# Patient Record
Sex: FEMALE | Race: WHITE | NOT HISPANIC OR LATINO | Employment: OTHER | URBAN - METROPOLITAN AREA
[De-identification: names, ages, dates, MRNs, and addresses within clinical notes are randomized per-mention and may not be internally consistent; named-entity substitution may affect disease eponyms.]

---

## 2018-11-19 ENCOUNTER — HOSPITAL ENCOUNTER (EMERGENCY)
Facility: HOSPITAL | Age: 73
Discharge: HOME | End: 2018-11-19
Attending: EMERGENCY MEDICINE
Payer: MEDICARE

## 2018-11-19 VITALS — BODY MASS INDEX: 24.54 KG/M2 | WEIGHT: 125.66 LBS

## 2018-11-19 DIAGNOSIS — H81.10 BENIGN PAROXYSMAL POSITIONAL VERTIGO, UNSPECIFIED LATERALITY: Primary | ICD-10-CM

## 2018-11-19 PROCEDURE — 99283 EMERGENCY DEPT VISIT LOW MDM: CPT

## 2018-11-19 RX ORDER — MECLIZINE HCL 12.5 MG/1
12.5 TABLET ORAL EVERY 8 HOURS PRN
Qty: 6 TABLET | Refills: 0 | Status: SHIPPED | OUTPATIENT
Start: 2018-11-19 | End: 2021-02-21

## 2018-11-19 NOTE — ED PROVIDER NOTES
History  Chief Complaint   Patient presents with    Dizziness     states she had an episode of dizzniess yesteday morning which passed, throughout last night when getting up to use the bathroom she had multiple episodes again, which continued into this morning  denies hx of vertigo or any recent illness  HPI     54-year-old female with history only of glaucoma presenting for evaluation of discrete episodes of dizziness  Patient states that she had an episode of dizziness which she describes as the room spinning yesterday morning which passed after about a minute  States that she was asymptomatic throughout the night, but this morning when she got up to use the bathroom had another episode of abrupt onset sensation of the room spinning, resolved when she sat down  Has occurred intermittently a couple of times since then  No history of vertigo  Denies recent illness  No head injury, slurred speech, or facial droop  Denies gait disturbance, but does report feeling like she had to hold onto the wall when she felt dizzy to keep from falling  No blurry vision or double vision  No focal sensory deficits  Denies headache or neck pain  No history of TIA or CVA  Symptoms are sometimes worse with turning the head from side to side or ambulating  Denies feeling lightheaded or like she is going to pass out  No chest pain, shortness of breath, or palpitations  None       Past Medical History:   Diagnosis Date    Glaucoma        Past Surgical History:   Procedure Laterality Date    TONSILLECTOMY         History reviewed  No pertinent family history  I have reviewed and agree with the history as documented  Social History   Substance Use Topics    Smoking status: Never Smoker    Smokeless tobacco: Never Used    Alcohol use Yes      Comment: social        Review of Systems   Constitutional: Negative for chills and fever  HENT: Negative for congestion  Eyes: Negative for visual disturbance  Respiratory: Negative for cough and shortness of breath  Cardiovascular: Negative for chest pain, palpitations and leg swelling  Gastrointestinal: Negative for abdominal pain, nausea and vomiting  Genitourinary: Negative for dysuria and frequency  Musculoskeletal: Negative for arthralgias, back pain, neck pain and neck stiffness  Skin: Negative for rash  Neurological: Positive for dizziness  Negative for tremors, syncope, facial asymmetry, speech difficulty, weakness, light-headedness, numbness and headaches  Psychiatric/Behavioral: Negative for agitation, behavioral problems and confusion  Physical Exam  Physical Exam   Constitutional: She is oriented to person, place, and time  She appears well-developed and well-nourished  No distress  HENT:   Head: Normocephalic and atraumatic  Right Ear: External ear normal    Left Ear: External ear normal    Nose: Nose normal    Mouth/Throat: Oropharynx is clear and moist    TMs normal in appearance  Eyes: Pupils are equal, round, and reactive to light  Conjunctivae and EOM are normal    Visual fields intact to confrontation   Neck: Normal range of motion  Neck supple  Cardiovascular: Normal rate, regular rhythm, normal heart sounds and intact distal pulses  Exam reveals no gallop and no friction rub  No murmur heard  Pulmonary/Chest: Effort normal and breath sounds normal  No respiratory distress  She has no wheezes  She has no rales  Abdominal: Soft  Bowel sounds are normal  She exhibits no distension  There is no tenderness  There is no guarding  Musculoskeletal: Normal range of motion  She exhibits no edema or deformity  Neurological: She is alert and oriented to person, place, and time  She exhibits normal muscle tone  Face symmetric, tongue midline, 5/5 strength in the proximal and distal upper and lower extremities bilaterally with intact sensation to light touch throughout  CN II-XII intact   Normal finger-to-nose, rapid alternating movements, and heel-to-shin bilaterally  Normal speech, normal gait  No pronator drift  Skin: Skin is warm and dry  She is not diaphoretic  Vital Signs  ED Triage Vitals   Temp Pulse Resp BP SpO2   -- -- -- -- --      Temp src Heart Rate Source Patient Position - Orthostatic VS BP Location FiO2 (%)   -- -- -- -- --      Pain Score       --           There were no vitals filed for this visit  Visual Acuity      ED Medications  Medications - No data to display    Diagnostic Studies  Results Reviewed     None                 No orders to display              Procedures  Procedures       Phone Contacts  ED Phone Contact    ED Course                               MDM  Number of Diagnoses or Management Options  Benign paroxysmal positional vertigo, unspecified laterality: new and requires workup  Diagnosis management comments:   Generally well appearing  Afebrile and hemodynamically stable  Patient is asymptomatic on arrival, with normal neuro exam as above  Sensation of room spinning did recur with turning the head from side to side and ambulating, resolved after about 30 sec  No central ataxia, difficulty with finger-to-nose, heel-to-shin, or rapid alternating movements to suggest central etiology of her vertigo  She denies hearing loss or ringing of the ears, TMs are normal in appearance  No neck pain, history of trauma, or neurologic findings to suggest cervical artery dissection  Suspect benign paroxysmal positional vertigo as the cause of her symptoms  Patient declines meclizine or Valium, stating that her symptoms are getting better on their own  Prescription provided for meclizine for home if she needs it  We discussed return precautions for vertigo at rest or new neurologic symptoms  She was discharged in good condition      Patient Progress  Patient progress: stable    CritCare Time       Disposition  Final diagnoses:   Benign paroxysmal positional vertigo, unspecified laterality     Time reflects when diagnosis was documented in both MDM as applicable and the Disposition within this note     Time User Action Codes Description Comment    11/19/2018  8:38 AM Coleman Henderson Add [H81 10] Benign paroxysmal positional vertigo, unspecified laterality       ED Disposition     ED Disposition Condition Comment    Discharge  Radha Moss discharge to home/self care  Condition at discharge: Good        Follow-up Information    None         Discharge Medication List as of 11/19/2018  8:40 AM      START taking these medications    Details   meclizine (ANTIVERT) 12 5 MG tablet Take 1 tablet (12 5 mg total) by mouth every 8 (eight) hours as needed for dizziness for up to 3 days, Starting Mon 11/19/2018, Until Thu 11/22/2018, Print           No discharge procedures on file      ED Provider  Electronically Signed by           Rosa Elena Collazo MD  11/19/18 0535

## 2018-11-19 NOTE — DISCHARGE INSTRUCTIONS
Benign Paroxysmal Positional Vertigo   WHAT YOU NEED TO KNOW:   BPPV is an inner ear condition that causes you to suddenly feel dizzy  Benign means it is not serious or life-threatening  BPPV is caused by a problem with the nerves and structure of your inner ear  BPPV happens when small pieces of calcium break loose and lump together in one of your inner ear canals  DISCHARGE INSTRUCTIONS:   Seek care immediately if:   · You fall during a BPPV episode and are injured  · You have a severe headache that does not go away  · You have new changes in your vision or feel weak or confused  · You have problems hearing, or you have ringing or buzzing in your ears  Contact your healthcare provider if:   · Your BPPV symptoms do not go away or they return  · You have problems with your balance, or you are falling often  · You have new or increased nausea or vomiting with vertigo  · You feel anxious or depressed and do not want to leave your home  · You have questions or concerns about your condition or care  Medicines:   · Medicines  may be recommended or prescribed to treat dizziness or nausea  · Take your medicine as directed  Contact your healthcare provider if you think your medicine is not helping or if you have side effects  Tell him of her if you are allergic to any medicine  Keep a list of the medicines, vitamins, and herbs you take  Include the amounts, and when and why you take them  Bring the list or the pill bottles to follow-up visits  Carry your medicine list with you in case of an emergency  Prevent your symptoms:   · Try to avoid sudden head movements  Stand up and lie down slowly  · Raise and support your head when you lie down  Place pillows under your upper back and head or rest in a recliner  · Change your position often when you are lying down  Try not to lie with your head on the same side for long periods of time  Roll over slowly       · Wear protective gear when you ride a bike or play sports  A helmet helps protect your head from injury  Follow up with your healthcare provider as directed: You may need to return in 1 month to check the progress of your treatment  Write down your questions so you remember to ask them during your visits  © 2017 [employer] Information is for End User's use only and may not be sold, redistributed or otherwise used for commercial purposes  All illustrations and images included in CareNotes® are the copyrighted property of [employer]  or [employer]ind [employer]  The above information is an  only  It is not intended as medical advice for individual conditions or treatments  Talk to your doctor, nurse or pharmacist before following any medical regimen to see if it is safe and effective for you

## 2018-11-26 ENCOUNTER — TRANSCRIBE ORDERS (OUTPATIENT)
Dept: ADMINISTRATIVE | Facility: HOSPITAL | Age: 73
End: 2018-11-26

## 2018-11-26 DIAGNOSIS — R42 DIZZINESS: Primary | ICD-10-CM

## 2018-11-28 ENCOUNTER — HOSPITAL ENCOUNTER (OUTPATIENT)
Dept: NON INVASIVE DIAGNOSTICS | Facility: CLINIC | Age: 73
Discharge: HOME | End: 2018-11-28
Payer: MEDICARE

## 2018-11-28 DIAGNOSIS — R42 DIZZINESS: ICD-10-CM

## 2018-11-28 PROCEDURE — 93880 EXTRACRANIAL BILAT STUDY: CPT | Performed by: SURGERY

## 2018-11-28 PROCEDURE — 93880 EXTRACRANIAL BILAT STUDY: CPT

## 2018-11-30 ENCOUNTER — EVALUATION (OUTPATIENT)
Dept: PHYSICAL THERAPY | Facility: CLINIC | Age: 73
End: 2018-11-30
Payer: MEDICARE

## 2018-11-30 ENCOUNTER — TRANSCRIBE ORDERS (OUTPATIENT)
Dept: PHYSICAL THERAPY | Facility: CLINIC | Age: 73
End: 2018-11-30

## 2018-11-30 VITALS — DIASTOLIC BLOOD PRESSURE: 78 MMHG | SYSTOLIC BLOOD PRESSURE: 145 MMHG | HEART RATE: 77 BPM

## 2018-11-30 DIAGNOSIS — R42 DIZZINESS: Primary | ICD-10-CM

## 2018-11-30 DIAGNOSIS — R42 DIZZINESS AND GIDDINESS: Primary | ICD-10-CM

## 2018-11-30 PROCEDURE — G8982 BODY POS GOAL STATUS: HCPCS | Performed by: PHYSICAL THERAPIST

## 2018-11-30 PROCEDURE — 97162 PT EVAL MOD COMPLEX 30 MIN: CPT | Performed by: PHYSICAL THERAPIST

## 2018-11-30 PROCEDURE — 97112 NEUROMUSCULAR REEDUCATION: CPT | Performed by: PHYSICAL THERAPIST

## 2018-11-30 PROCEDURE — 97140 MANUAL THERAPY 1/> REGIONS: CPT | Performed by: PHYSICAL THERAPIST

## 2018-11-30 PROCEDURE — G8981 BODY POS CURRENT STATUS: HCPCS | Performed by: PHYSICAL THERAPIST

## 2018-11-30 NOTE — LETTER
2018    Jose Blake, [address] [facility] [address] [facility]  [id number]    Patient: Radha Moss   YOB: 1945   Date of Visit: 2018     Encounter Diagnosis     ICD-10-CM    1  Dizziness R42        Dear Dr Fermin Matters:    Please review the attached Plan of Care from [facility] recent visit  Please verify that you agree therapy should continue by signing the attached document and sending it back to our office  If you have any questions or concerns, please don't hesitate to call  Sincerely,    Francis Pod, PT, DPT, OCS      Referring Provider:      I certify that I have read the below Plan of Care and certify the need for these services furnished under this plan of treatment while under my care  Jose Blake MD  [address] [place] [address] [address] [address] [address]: [phone]          PT Evaluation     Today's date: 2018  Patient name: Radha Moss  : 1945  MRN: [id number]  Referring provider: Kerri Ceja MD  Dx:   Encounter Diagnosis     ICD-10-CM    1  Dizziness R42                   Assessment  Assessment details: Pt presents with signs and 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  Pt presents with episodic occurrence, decreased VOR speed, and postural awareness  Pt would benefit skilled PT intervention to address these impairments and if she demonstrates no improvement over the next 4-6 visits she will likely benefit compensation tactics to allow her to be able to return back to baseline    Thank you very much for this kind referral     Impairments: abnormal or restricted ROM, activity intolerance and impaired physical strength  Understanding of Dx/Px/POC: good   Prognosis: good  Prognosis details: STG 4 Weeks:  Decrease episode to 2xs a day  Improve VOR speed to 1 5 BPS  Improve strength to DNF to 20"  Independent with HEP  LTG 8 Weeks:  Decrease episode to 1-2x a week  Improve VOR Speed to 2  0 BPS  Improve strength to DNF to 30"  Able to perform all desired activities with minimal to nil symptom exacerbation      Plan  Patient would benefit from: skilled physical therapy  Planned modality interventions: cryotherapy and thermotherapy: hydrocollator packs  Planned therapy interventions: neuromuscular re-education, manual therapy, joint mobilization, home exercise program, graded exercise, graded activity, gait training, functional ROM exercises, flexibility, therapeutic exercise, stretching, strengthening, patient education and postural training  Frequency: 2x week  Duration in weeks: 8  Treatment plan discussed with: patient        Subjective Evaluation    History of Present Illness  Date of onset: 11/30/2018  Mechanism of injury: Pt is a 75 yofemale who is RHD presents today stating that on 11/16/18 pt reports that she was getting out of bed in the middle of the night falling forward and her legs weren't cooperating  She initially after trying to walk she wasn't feeling well and had to hold onto things such as walls and chairs  Pt reports that she had this occur for 2 days of this and then she went to the hospital   She had an assessment and ruled out any concerns of Stroke  She states that she had the Doplar performed 2 days ago, no results yet  She met with ENT which went well, they believe it is possible R SCC after their assessment, and referred to PT  Pt reports that she isn't taking meclizine often but still is  She states when she turns to the R her symptoms appear to occur more and more and they last about 10-15 seconds  Pt reports she as a whole continues to feel as though she is continuing to improve and is having 3-6 episodes a day  Pt reports that her goals at this time time are to get rid of this, drive, and return to exercise  Pt denies change in bowel or bladder, no radicular symptoms     Quality of life: good    Pain  No pain reported      Diagnostic Tests  No diagnostic tests performed  Patient Goals  Patient/family treatment goals: Decrease dizziness symptoms  Objective     Active Range of Motion   Cervical/Thoracic Spine   Cervical    Flexion: 25 degrees   Extension: 50 degrees   Left lateral flexion: 25 degrees   Right lateral flexion: 25 degrees   Left rotation: 75 degrees   Right rotation: 75 degrees     Additional Active Range of Motion Details  Forward head, rounded shoulders  Pt is far sided with glasses  B/L Sensation intact to light touch C3,4,5,6,7,8,T1,T2  UE screen strong and painless  CS Screen WFL and without symptom reproduction  SLS L 10" no LOB R 10" no LOB  Palpation No pain t/o palpation  Ocular ROM WFL,however challenged with look to the L > R  Convergence/Divergence WFL  VOR L and R  5 BPS, sup/inf 1 0  STs Head Shake mild beating to the L, Head Thrust to the L mild over shoot, to the R mod overshoot with residual beating    DHP to R torsional upbeating lasting < 15"  DNF 13"      Flowsheet Rows      Most Recent Value   PT/OT G-Codes   Assessment Type  Evaluation   G code set  Changing & Maintaining Body Position   Changing and Maintaining Body Position Current Status ()  CJ   Changing and Maintaining Body Position Goal Status ()  CI          Precautions: HTN, Glaucoma    Daily Treatment Diary       Manual 11/30            DHP to R, Epley to L  10 mins                                                                Exercise Diary             VOR x 1 Head stable 2 targets             VOR x 1, head shaking 1 target             Scap Add             DNF seated             Scaption             SLS >> Foam             Head shaking walking             Sit to stands                                                                                                                                                                                      Modalities

## 2018-11-30 NOTE — PROGRESS NOTES
PT Evaluation     Today's date: 2018  Patient name: Myriam Beck  : 1945  MRN: [id number]  Referring provider: Bonilla Gomze MD  Dx:   Encounter Diagnosis     ICD-10-CM    1  Dizziness R42                   Assessment  Assessment details: Pt presents with signs and 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  Pt presents with episodic occurrence, decreased VOR speed, and postural awareness  Pt would benefit skilled PT intervention to address these impairments and if she demonstrates no improvement over the next 4-6 visits she will likely benefit compensation tactics to allow her to be able to return back to baseline    Thank you very much for this kind referral     Impairments: abnormal or restricted ROM, activity intolerance and impaired physical strength  Understanding of Dx/Px/POC: good   Prognosis: good  Prognosis details: STG 4 Weeks:  Decrease episode to 2xs a day  Improve VOR speed to 1 5 BPS  Improve strength to DNF to 20"  Independent with HEP  LTG 8 Weeks:  Decrease episode to 1-2x a week  Improve VOR Speed to 2 0 BPS  Improve strength to DNF to 30"  Able to perform all desired activities with minimal to nil symptom exacerbation      Plan  Patient would benefit from: skilled physical therapy  Planned modality interventions: cryotherapy and thermotherapy: hydrocollator packs  Planned therapy interventions: neuromuscular re-education, manual therapy, joint mobilization, home exercise program, graded exercise, graded activity, gait training, functional ROM exercises, flexibility, therapeutic exercise, stretching, strengthening, patient education and postural training  Frequency: 2x week  Duration in weeks: 8  Treatment plan discussed with: patient        Subjective Evaluation    History of Present Illness  Date of onset: 2018  Mechanism of injury: Pt is a 75 yofemale who is RHD presents today stating that on 18 pt reports that she was getting out of bed in the middle of the night falling forward and her legs weren't cooperating  She initially after trying to walk she wasn't feeling well and had to hold onto things such as walls and chairs  Pt reports that she had this occur for 2 days of this and then she went to the hospital   She had an assessment and ruled out any concerns of Stroke  She states that she had the Doplar performed 2 days ago, no results yet  She met with ENT which went well, they believe it is possible R SCC after their assessment, and referred to PT  Pt reports that she isn't taking meclizine often but still is  She states when she turns to the R her symptoms appear to occur more and more and they last about 10-15 seconds  Pt reports she as a whole continues to feel as though she is continuing to improve and is having 3-6 episodes a day  Pt reports that her goals at this time time are to get rid of this, drive, and return to exercise  Pt denies change in bowel or bladder, no radicular symptoms  Quality of life: good    Pain  No pain reported      Diagnostic Tests  No diagnostic tests performed  Patient Goals  Patient/family treatment goals: Decrease dizziness symptoms  Objective     Active Range of Motion   Cervical/Thoracic Spine   Cervical    Flexion: 25 degrees   Extension: 50 degrees   Left lateral flexion: 25 degrees   Right lateral flexion: 25 degrees   Left rotation: 75 degrees   Right rotation: 75 degrees     Additional Active Range of Motion Details  Forward head, rounded shoulders  Pt is far sided with glasses  B/L Sensation intact to light touch C3,4,5,6,7,8,T1,T2  UE screen strong and painless  CS Screen WFL and without symptom reproduction  SLS L 10" no LOB R 10" no LOB  Palpation No pain t/o palpation  Ocular ROM WFL,however challenged with look to the L > R      Convergence/Divergence WFL  VOR L and R  5 BPS, sup/inf 1 0  STs Head Shake mild beating to the L, Head Thrust to the L mild over shoot, to the R mod overshoot with residual beating    DHP to R torsional upbeating lasting < 15"  DNF 13"      Flowsheet Rows      Most Recent Value   PT/OT G-Codes   Assessment Type  Evaluation   G code set  Changing & Maintaining Body Position   Changing and Maintaining Body Position Current Status ()  CJ   Changing and Maintaining Body Position Goal Status ()  CI          Precautions: HTN, Glaucoma    Daily Treatment Diary       Manual 11/30            DHP to R, Epley to L  10 mins                                                                Exercise Diary             VOR x 1 Head stable 2 targets             VOR x 1, head shaking 1 target             Scap Add             DNF seated             Scaption             SLS >> Foam             Head shaking walking             Sit to stands                                                                                                                                                                                      Modalities

## 2018-12-03 ENCOUNTER — OFFICE VISIT (OUTPATIENT)
Dept: PHYSICAL THERAPY | Facility: CLINIC | Age: 73
End: 2018-12-03
Payer: MEDICARE

## 2018-12-03 DIAGNOSIS — R42 DIZZINESS: Primary | ICD-10-CM

## 2018-12-03 PROCEDURE — 97112 NEUROMUSCULAR REEDUCATION: CPT | Performed by: PHYSICAL THERAPIST

## 2018-12-03 PROCEDURE — 97140 MANUAL THERAPY 1/> REGIONS: CPT | Performed by: PHYSICAL THERAPIST

## 2018-12-03 NOTE — PROGRESS NOTES
Daily Note     Today's date: 12/3/2018  Patient name: Aleta Keen  : 1945  MRN: [id number]  Referring provider: Larry Preston MD  Dx:   Encounter Diagnosis     ICD-10-CM    1  Dizziness R42                   Subjective: Pt presents today stating that is feeling better as a whole, she states that Saturday she had several moments, but had no incidents yesterday and she drove quite a bit  Objective: See treatment diary below      Assessment: Performed Epley Procedure to R with symptoms lasting less than 8"  Symptoms were also decreased in intensity  Pt proceeded with outlined exercises well, good postural awareness as well as improved VOR speed by   5bps from IE        Precautions: HTN, Glaucoma    Daily Treatment Diary       Manual 11/30 12/3           DHP to R, Epley to L  10 mins 10 min                                                               Exercise Diary             VOR x 1 Head stable 2 targets  30" x 2           VOR x 1, head shaking 1 target  30" x 2           Scap Add  10" x 10           DNF seated  10" x 10           Scaption  2 x 10           SLS >> Foam  20" x 4           Head shaking walking             Sit to stands                                                                                                                                                                                      Modalities

## 2018-12-06 ENCOUNTER — OFFICE VISIT (OUTPATIENT)
Dept: PHYSICAL THERAPY | Facility: CLINIC | Age: 73
End: 2018-12-06
Payer: MEDICARE

## 2018-12-06 DIAGNOSIS — R42 DIZZINESS: Primary | ICD-10-CM

## 2018-12-06 PROCEDURE — 97112 NEUROMUSCULAR REEDUCATION: CPT | Performed by: PHYSICAL THERAPIST

## 2018-12-06 PROCEDURE — 97140 MANUAL THERAPY 1/> REGIONS: CPT | Performed by: PHYSICAL THERAPIST

## 2018-12-06 NOTE — PROGRESS NOTES
Daily Note     Today's date: 2018  Patient name: Nilson Sullivan  : 1945  MRN: [id number]  Referring provider: Sergio De La Cruz MD  Dx:   Encounter Diagnosis     ICD-10-CM    1  Dizziness R42                   Subjective: Pt presents today stating that she has had maybe 1-2 episodes in the last 3 days  She states she self DHP'd and was able to cause the symptoms to occur however she states that she has begun to start exercising again and felt well  Objective: See treatment diary below      Assessment:   Proceeded with further dynamic surface balance without falter  Today was 3rd trial of DHP to R with L Epley correction    If pt still having symptoms Trial DHP to L, with R Epley correction n v      Precautions: HTN, Glaucoma    Daily Treatment Diary       Manual 11/30 12/3 12/6          DHP to R, Epley to L  10 mins 10 min 10 min DHP to L, Epley to R                                                             Exercise Diary             VOR x 1 Head stable 2 targets  30" x 2 30" x 2          VOR x 1, head shaking 1 target  30" x 2 30" x2          Scap Add  10" x 10 10" x 10          DNF seated  10" x 10 10" x 10          Scaption  2 x 10 2 x 10          SLS >> Foam  20" x 4 20" x 4          Head shaking walking             Sit to stands   2 x 10          NBOS EC   30"x  2                                                                                                                                                                      Modalities

## 2018-12-11 ENCOUNTER — OFFICE VISIT (OUTPATIENT)
Dept: PHYSICAL THERAPY | Facility: CLINIC | Age: 73
End: 2018-12-11
Payer: MEDICARE

## 2018-12-11 DIAGNOSIS — R42 DIZZINESS: Primary | ICD-10-CM

## 2018-12-11 PROCEDURE — 97112 NEUROMUSCULAR REEDUCATION: CPT | Performed by: PHYSICAL THERAPIST

## 2018-12-11 PROCEDURE — 97140 MANUAL THERAPY 1/> REGIONS: CPT | Performed by: PHYSICAL THERAPIST

## 2018-12-11 NOTE — PROGRESS NOTES
Daily Note     Today's date: 2018  Patient name: Demarcus Johnson  : 1945  MRN: [id number]  Referring provider: Cassandra Dawkins MD  Dx:   Encounter Diagnosis     ICD-10-CM    1  Dizziness R42                   Subjective: Pt presents today stating that she is having a tough day today, but she had no major episodes over the last week but today she is very sensitive to it today  Objective: See treatment diary below      Assessment:  Proceeded with DHP L with Epley to R for corrective component  Brief beating component evident with Epley torsional up beating  Quick recovery, will follow up next visit on Thursday with anticipation of being asymptomatic       Precautions: HTN, Glaucoma    Daily Treatment Diary       Manual 11/30 12/3 12/6 12/11         DHP to R, Epley to L  10 mins 10 min 10 min DHP to L, Epley to R x 10 min                                                             Exercise Diary             VOR x 1 Head stable 2 targets  30" x 2 30" x 2 30" x 2         VOR x 1, head shaking 1 target  30" x 2 30" x2 30" x 2         Scap Add  10" x 10 10" x 10 10" x 10         DNF seated  10" x 10 10" x 10 10" x 10         Scaption  2 x 10 2 x 10 2 x 10         SLS >> Foam  20" x 4 20" x 4 20" x 4         Head shaking walking             Sit to stands   2 x 10 2 x 10         NBOS EC   30"x  2 30" x 2                                                                                                                                                                     Modalities

## 2018-12-13 ENCOUNTER — OFFICE VISIT (OUTPATIENT)
Dept: PHYSICAL THERAPY | Facility: CLINIC | Age: 73
End: 2018-12-13
Payer: MEDICARE

## 2018-12-13 DIAGNOSIS — R42 DIZZINESS: Primary | ICD-10-CM

## 2018-12-13 PROCEDURE — 97112 NEUROMUSCULAR REEDUCATION: CPT | Performed by: PHYSICAL THERAPIST

## 2018-12-13 PROCEDURE — 97140 MANUAL THERAPY 1/> REGIONS: CPT | Performed by: PHYSICAL THERAPIST

## 2018-12-13 NOTE — PROGRESS NOTES
Daily Note     Today's date: 2018  Patient name: Hayden Barakat  : 1945  MRN: [id number]  Referring provider: Anthony Coreas MD  Dx:   Encounter Diagnosis     ICD-10-CM    1  Dizziness R42                   Subjective: Pt presents today stating that she was feeling very well yesterday and today minimal symptoms with her husbands driving, but she states that as a whole she has been feeling continuous progression and the last 48 hours this has been the best yet  Objective: See treatment diary below      Assessment:  DHP to L with Epley correction to R < 5: nystagmus and episode  Pt continues to make large gains and progression       Precautions: HTN, Glaucoma    Daily Treatment Diary       Manual 11/30 12/3 12/6 12/11 12/13        DHP to R, Epley to L  10 mins 10 min 10 min DHP to L, Epley to R x 10 min DHP to L, Epley to R x 10                                                            Exercise Diary             VOR x 1 Head stable 2 targets  30" x 2 30" x 2 30" x 2 30"x  2        VOR x 1, head shaking 1 target  30" x 2 30" x2 30" x 2 30" x 2        Scap Add  10" x 10 10" x 10 10" x 10 10" x 10        DNF seated  10" x 10 10" x 10 10" x 10 10" x 10        Scaption  2 x 10 2 x 10 2 x 10 2 x 10        SLS >> Foam  20" x 4 20" x 4 20" x 4 20" x 4        Head shaking walking             Sit to stands   2 x 10 2 x 10 2 x 10        NBOS EC   30"x  2 30" x 2 30" x 2                                                                                                                                                                    Modalities

## 2018-12-17 ENCOUNTER — OFFICE VISIT (OUTPATIENT)
Dept: PHYSICAL THERAPY | Facility: CLINIC | Age: 73
End: 2018-12-17
Payer: MEDICARE

## 2018-12-17 DIAGNOSIS — R42 DIZZINESS: Primary | ICD-10-CM

## 2018-12-17 PROCEDURE — 97140 MANUAL THERAPY 1/> REGIONS: CPT | Performed by: PHYSICAL THERAPIST

## 2018-12-17 PROCEDURE — 97112 NEUROMUSCULAR REEDUCATION: CPT | Performed by: PHYSICAL THERAPIST

## 2018-12-17 NOTE — PROGRESS NOTES
Daily Note     Today's date: 2018  Patient name: Juanjose Norwood  : 1945  MRN: [id number]  Referring provider: Sonja Garcia MD  Dx:   Encounter Diagnosis     ICD-10-CM    1  Dizziness R42                   Subjective: Pt presents today stating that she had a good weekend, no major dizziness episodes, also went out with her  for an active [employer] party asymptomatically  Objective: See treatment diary below      Assessment:  Performed walking with head shaking, causing mild difficulty but without symptom exacerbation    RE n v      Precautions: HTN, Glaucoma    Daily Treatment Diary       Manual 11/30 12/3 12/6 12/11 12/13 12/17       DHP to R, Epley to L  10 mins 10 min 10 min DHP to L, Epley to R x 10 min DHP to L, Epley to R x 10 DHP to L, Epley to R 10 min                                                           Exercise Diary             VOR x 1 Head stable 2 targets  30" x 2 30" x 2 30" x 2 30"x  2 30"x  2       VOR x 1, head shaking 1 target  30" x 2 30" x2 30" x 2 30" x 2 30" x 2       Scap Add  10" x 10 10" x 10 10" x 10 10" x 10 10" x 10       DNF seated  10" x 10 10" x 10 10" x 10 10" x 10 10" x 10       Scaption  2 x 10 2 x 10 2 x 10 2 x 10 2 x 10       SLS >> Foam  20" x 4 20" x 4 20" x 4 20" x 4 20" x 4       Head shaking walking      2 laps       Sit to stands   2 x 10 2 x 10 2 x 10 2 x 10       NBOS EC   30"x  2 30" x 2 30" x 2 30" x 2                                                                                                                                                                   Modalities

## 2018-12-19 ENCOUNTER — EVALUATION (OUTPATIENT)
Dept: PHYSICAL THERAPY | Facility: CLINIC | Age: 73
End: 2018-12-19
Payer: MEDICARE

## 2018-12-19 ENCOUNTER — TRANSCRIBE ORDERS (OUTPATIENT)
Dept: PHYSICAL THERAPY | Facility: CLINIC | Age: 73
End: 2018-12-19

## 2018-12-19 DIAGNOSIS — R42 DIZZINESS AND GIDDINESS: Primary | ICD-10-CM

## 2018-12-19 DIAGNOSIS — R42 DIZZINESS: Primary | ICD-10-CM

## 2018-12-19 PROCEDURE — G8981 BODY POS CURRENT STATUS: HCPCS | Performed by: PHYSICAL THERAPIST

## 2018-12-19 PROCEDURE — G8983 BODY POS D/C STATUS: HCPCS | Performed by: PHYSICAL THERAPIST

## 2018-12-19 PROCEDURE — 97112 NEUROMUSCULAR REEDUCATION: CPT | Performed by: PHYSICAL THERAPIST

## 2018-12-19 PROCEDURE — 97140 MANUAL THERAPY 1/> REGIONS: CPT | Performed by: PHYSICAL THERAPIST

## 2018-12-19 PROCEDURE — G8982 BODY POS GOAL STATUS: HCPCS | Performed by: PHYSICAL THERAPIST

## 2018-12-19 NOTE — LETTER
2018    Jose Blake, [address] [address]    Patient: Radha Moss   YOB: 1945   Date of Visit: 2018     Encounter Diagnosis     ICD-10-CM    1  Dizziness R42        Dear Dr Fermin Matters:    Please review the attached Plan of Care from [facility] recent visit  Please verify that you agree therapy should continue by signing the attached document and sending it back to our office  If you have any questions or concerns, please don't hesitate to call  Sincerely,    Francis Pod, PT, DPT, OCS      Referring Provider:      I certify that I have read the below Plan of Care and certify the need for these services furnished under this plan of treatment while under my care  Jose Blake MD  [address] [place] [address] [address] [facility] [address]: [phone]          PT Evaluation     Today's date: 2018  Patient name: Radha Moss  : 1945  MRN: [id number]  Referring provider: Kerri Ceja MD  Dx:   Encounter Diagnosis     ICD-10-CM    1  Dizziness R42                   Assessment  Assessment details: Pt at this time demonstrates excellent recovery from her initial injury  She does have symptom reproduction, which is minimal and rare at this time, her tolerance to activity and postural awareness is excellent and shows very good independence with HEP  She is not a fall risk per SLS testing  She has mild decrease in optimal VOR speed which if she continues with training her VOR exercises may improve to ideal speeds however she has achieved all other goals with this exception sought out for her as well as by her and if she is to have a decline in any form she is welcome to return as needed    Thank you very much for this kind and motivated referral    Impairments: abnormal or restricted ROM, activity intolerance and impaired physical strength  Understanding of Dx/Px/POC: good   Prognosis: good  Prognosis details: STG 4 Weeks:  Decrease episode to 2xs a day -met  Improve VOR speed to 1 5 BPS -met  Improve strength to DNF to 20" -met  Independent with HEP-met  LTG 8 Weeks:  Decrease episode to 1-2x a week -met  Improve VOR Speed to 2 0 BPS -partial  Improve strength to DNF to 30" -met  Able to perform all desired activities with minimal to nil symptom exacerbation - met      Plan  Plan details: Plan is for DC, if decline welcome to return prn  Patient would benefit from: skilled physical therapy  Planned modality interventions: cryotherapy and thermotherapy: hydrocollator packs  Planned therapy interventions: neuromuscular re-education, manual therapy, joint mobilization, home exercise program, graded exercise, graded activity, gait training, functional ROM exercises, flexibility, therapeutic exercise, stretching, strengthening, patient education and postural training  Duration in weeks: 6  Treatment plan discussed with: patient        Subjective Evaluation    History of Present Illness  Date of onset: 11/30/2018  Mechanism of injury: Pt presents today stating that she is doing okay  She reports that she is having far frequent episodes, maybe 1-2 a week  She has been able to drive, able to sleep and if she does have an episodic experience few times a week, but it is now no where severe as it has been and she is comfortable at this time in regards of her HEP and is ready to be DC  Pt reports that she follows up with Dr Bria Do today at 1:00  Quality of life: good    Pain  No pain reported      Diagnostic Tests  No diagnostic tests performed  Patient Goals  Patient/family treatment goals: Decrease dizziness symptoms             Objective     Active Range of Motion   Cervical/Thoracic Spine   Cervical    Flexion: 60 degrees   Extension: 55 degrees   Left lateral flexion: 30 degrees   Right lateral flexion: 30 degrees   Left rotation: 80 degrees   Right rotation: 80 degrees     Additional Active Range of Motion Details  Forward head, rounded shoulders  Pt is far sided with glasses  B/L Sensation intact to light touch C3,4,5,6,7,8,T1,T2  UE screen strong and painless  CS Screen WFL and without symptom reproduction  SLS L 10" no LOB R 10" no LOB  Palpation No pain t/o palpation  Ocular ROM WFL,however challenged with look to the L > R  Convergence/Divergence WFL  VOR L and R 1 5 BPS, sup/inf 2 STs   Head Shake mild beating to the L, Head Thrust to the L mild over shoot, to the R mod overshoot with residual beating  DHP to R torsional upbeating lasting < 15"     (Asymptomatic t/o entire process today, except with DHP with < 2" torsional beating minimal symptoms )  DNF 30"       Flowsheet Rows      Most Recent Value   PT/OT G-Codes   Assessment Type  Re-evaluation   G code set  Changing & Maintaining Body Position   Changing and Maintaining Body Position Current Status ()  CI   Changing and Maintaining Body Position Goal Status ()  CI          Precautions: HTN, Glaucoma    Daily Treatment Diary       Manual 11/30 12/3 12/6 12/11 12/13 12/17 12/19      DHP to R, Epley to L  10 mins 10 min 10 min DHP to L, Epley to R x 10 min DHP to L, Epley to R x 10 DHP to L, Epley to R 10 min              15 min assessment                                             Exercise Diary             VOR x 1 Head stable 2 targets  30" x 2 30" x 2 30" x 2 30"x  2 30"x  2 30" x 2      VOR x 1, head shaking 1 target  30" x 2 30" x2 30" x 2 30" x 2 30" x 2 30" x 2      Scap Add  10" x 10 10" x 10 10" x 10 10" x 10 10" x 10 10" x 10      DNF seated  10" x 10 10" x 10 10" x 10 10" x 10 10" x 10 10" x 10      Scaption  2 x 10 2 x 10 2 x 10 2 x 10 2 x 10       SLS >> Foam  20" x 4 20" x 4 20" x 4 20" x 4 20" x 4       Head shaking walking      2 laps       Sit to stands   2 x 10 2 x 10 2 x 10 2 x 10       NBOS EC   30"x  2 30" x 2 30" x 2 30" x 2 Modalities

## 2018-12-19 NOTE — PROGRESS NOTES
PT Evaluation     Today's date: 2018  Patient name: Romi Patino  : 1945  MRN: [id number]  Referring provider: Tristan Jones MD  Dx:   Encounter Diagnosis     ICD-10-CM    1  Dizziness R42                   Assessment  Assessment details: Pt at this time demonstrates excellent recovery from her initial injury  She does have symptom reproduction, which is minimal and rare at this time, her tolerance to activity and postural awareness is excellent and shows very good independence with HEP  She is not a fall risk per SLS testing  She has mild decrease in optimal VOR speed which if she continues with training her VOR exercises may improve to ideal speeds however she has achieved all other goals with this exception sought out for her as well as by her and if she is to have a decline in any form she is welcome to return as needed  Thank you very much for this kind and motivated referral    Impairments: abnormal or restricted ROM, activity intolerance and impaired physical strength  Understanding of Dx/Px/POC: good   Prognosis: good  Prognosis details: STG 4 Weeks:  Decrease episode to 2xs a day -met  Improve VOR speed to 1 5 BPS -met  Improve strength to DNF to 20" -met  Independent with HEP-met  LTG 8 Weeks:  Decrease episode to 1-2x a week -met  Improve VOR Speed to 2 0 BPS -partial  Improve strength to DNF to 30" -met  Able to perform all desired activities with minimal to nil symptom exacerbation - met      Plan  Plan details: Plan is for DC, if decline welcome to return prn      Patient would benefit from: skilled physical therapy  Planned modality interventions: cryotherapy and thermotherapy: hydrocollator packs  Planned therapy interventions: neuromuscular re-education, manual therapy, joint mobilization, home exercise program, graded exercise, graded activity, gait training, functional ROM exercises, flexibility, therapeutic exercise, stretching, strengthening, patient education and postural training  Duration in weeks: 6  Treatment plan discussed with: patient        Subjective Evaluation    History of Present Illness  Date of onset: 11/30/2018  Mechanism of injury: Pt presents today stating that she is doing okay  She reports that she is having far frequent episodes, maybe 1-2 a week  She has been able to drive, able to sleep and if she does have an episodic experience few times a week, but it is now no where severe as it has been and she is comfortable at this time in regards of her HEP and is ready to be DC  Pt reports that she follows up with Dr rBia Do today at 1:00  Quality of life: good    Pain  No pain reported      Diagnostic Tests  No diagnostic tests performed  Patient Goals  Patient/family treatment goals: Decrease dizziness symptoms  Objective     Active Range of Motion   Cervical/Thoracic Spine   Cervical    Flexion: 60 degrees   Extension: 55 degrees   Left lateral flexion: 30 degrees   Right lateral flexion: 30 degrees   Left rotation: 80 degrees   Right rotation: 80 degrees     Additional Active Range of Motion Details  Forward head, rounded shoulders  Pt is far sided with glasses  B/L Sensation intact to light touch C3,4,5,6,7,8,T1,T2  UE screen strong and painless  CS Screen WFL and without symptom reproduction  SLS L 10" no LOB R 10" no LOB  Palpation No pain t/o palpation  Ocular ROM WFL,however challenged with look to the L > R  Convergence/Divergence WFL  VOR L and R 1 5 BPS, sup/inf 2 STs   Head Shake mild beating to the L, Head Thrust to the L mild over shoot, to the R mod overshoot with residual beating  DHP to R torsional upbeating lasting < 15"     (Asymptomatic t/o entire process today, except with DHP with < 2" torsional beating minimal symptoms )  DNF 30"       Flowsheet Rows      Most Recent Value   PT/OT G-Codes   Assessment Type  Re-evaluation   G code set  Changing & Maintaining Body Position   Changing and Maintaining Body Position Current Status ()  CI   Changing and Maintaining Body Position Goal Status ()  CI          Precautions: HTN, Glaucoma    Daily Treatment Diary       Manual 11/30 12/3 12/6 12/11 12/13 12/17 12/19      DHP to R, Epley to L  10 mins 10 min 10 min DHP to L, Epley to R x 10 min DHP to L, Epley to R x 10 DHP to L, Epley to R 10 min              15 min assessment                                             Exercise Diary             VOR x 1 Head stable 2 targets  30" x 2 30" x 2 30" x 2 30"x  2 30"x  2 30" x 2      VOR x 1, head shaking 1 target  30" x 2 30" x2 30" x 2 30" x 2 30" x 2 30" x 2      Scap Add  10" x 10 10" x 10 10" x 10 10" x 10 10" x 10 10" x 10      DNF seated  10" x 10 10" x 10 10" x 10 10" x 10 10" x 10 10" x 10      Scaption  2 x 10 2 x 10 2 x 10 2 x 10 2 x 10       SLS >> Foam  20" x 4 20" x 4 20" x 4 20" x 4 20" x 4       Head shaking walking      2 laps       Sit to stands   2 x 10 2 x 10 2 x 10 2 x 10       NBOS EC   30"x  2 30" x 2 30" x 2 30" x 2                                                                                                                                                                   Modalities

## 2019-01-15 NOTE — PROGRESS NOTES
PT Discharge    Today's date: 1/15/2019  Patient name: Demarcus Johnson  : 1945  MRN: [id number]  Referring provider: Cassandra Dawkins MD  Dx:   Encounter Diagnosis     ICD-10-CM    1  Dizziness R42        Start Time: 0800  Stop Time: 08  Total time in clinic (min): 25 minutes    Assessment/Plan Pt has not been present since 18  Pt's chart will be DC in compliance of facility policy as all Charts are DC within 30 days of last scheduled visit        Subjective    Objective    Flowsheet Rows      Most Recent Value   PT/OT G-Codes   Current Score  93   Projected Score  91   Assessment Type  Discharge   G code set  Changing & Maintaining Body Position   Changing and Maintaining Body Position Current Status ()  CI   Changing and Maintaining Body Position Goal Status ()  CI   Changing and Maintaining Body Position Discharge Status ()  CI

## 2019-04-05 ENCOUNTER — ANNUAL EXAM (OUTPATIENT)
Dept: OBGYN CLINIC | Facility: CLINIC | Age: 74
End: 2019-04-05
Payer: MEDICARE

## 2019-04-05 VITALS
BODY MASS INDEX: 23.98 KG/M2 | SYSTOLIC BLOOD PRESSURE: 130 MMHG | HEIGHT: 61 IN | WEIGHT: 127 LBS | DIASTOLIC BLOOD PRESSURE: 80 MMHG

## 2019-04-05 DIAGNOSIS — Z78.0 POSTMENOPAUSAL: ICD-10-CM

## 2019-04-05 DIAGNOSIS — Z12.39 BREAST SCREENING, UNSPECIFIED: ICD-10-CM

## 2019-04-05 DIAGNOSIS — Z01.419 ENCOUNTER FOR WELL WOMAN EXAM: Primary | ICD-10-CM

## 2019-04-05 DIAGNOSIS — N89.8 VAGINA ITCHING: ICD-10-CM

## 2019-04-05 PROCEDURE — G0101 CA SCREEN;PELVIC/BREAST EXAM: HCPCS | Performed by: PHYSICIAN ASSISTANT

## 2019-04-05 RX ORDER — SIMVASTATIN 20 MG
20 TABLET ORAL
COMMUNITY
End: 2021-03-03 | Stop reason: ALTCHOICE

## 2019-04-05 RX ORDER — MOMETASONE FUROATE 1 MG/G
CREAM TOPICAL DAILY
Qty: 45 G | Refills: 0 | Status: SHIPPED | OUTPATIENT
Start: 2019-04-05 | End: 2021-02-21

## 2019-04-05 RX ORDER — AMLODIPINE BESYLATE AND ATORVASTATIN CALCIUM 5; 10 MG/1; MG/1
1 TABLET, FILM COATED ORAL DAILY
Status: ON HOLD | COMMUNITY
End: 2021-02-21 | Stop reason: CLARIF

## 2020-02-26 ENCOUNTER — TRANSCRIBE ORDERS (OUTPATIENT)
Dept: ADMINISTRATIVE | Facility: HOSPITAL | Age: 75
End: 2020-02-26

## 2020-02-26 DIAGNOSIS — I35.1 NONRHEUMATIC AORTIC VALVE INSUFFICIENCY: Primary | ICD-10-CM

## 2021-01-20 ENCOUNTER — IMMUNIZATIONS (OUTPATIENT)
Dept: FAMILY MEDICINE CLINIC | Facility: HOSPITAL | Age: 76
End: 2021-01-20

## 2021-01-20 DIAGNOSIS — Z23 ENCOUNTER FOR IMMUNIZATION: Primary | ICD-10-CM

## 2021-01-20 PROCEDURE — 91300 SARS-COV-2 / COVID-19 MRNA VACCINE (PFIZER-BIONTECH) 30 MCG: CPT

## 2021-01-20 PROCEDURE — 0001A SARS-COV-2 / COVID-19 MRNA VACCINE (PFIZER-BIONTECH) 30 MCG: CPT

## 2021-02-10 ENCOUNTER — IMMUNIZATIONS (OUTPATIENT)
Dept: FAMILY MEDICINE CLINIC | Facility: HOSPITAL | Age: 76
End: 2021-02-10

## 2021-02-10 DIAGNOSIS — Z23 ENCOUNTER FOR IMMUNIZATION: Primary | ICD-10-CM

## 2021-02-10 PROCEDURE — 91300 SARS-COV-2 / COVID-19 MRNA VACCINE (PFIZER-BIONTECH) 30 MCG: CPT | Performed by: PHYSICIAN ASSISTANT

## 2021-02-10 PROCEDURE — 0002A SARS-COV-2 / COVID-19 MRNA VACCINE (PFIZER-BIONTECH) 30 MCG: CPT | Performed by: PHYSICIAN ASSISTANT

## 2021-02-21 ENCOUNTER — HOSPITAL ENCOUNTER (INPATIENT)
Facility: HOSPITAL | Age: 76
LOS: 2 days | Discharge: HOME HEALTH | DRG: 066 | End: 2021-02-23
Attending: ANESTHESIOLOGY | Admitting: ANESTHESIOLOGY
Payer: MEDICARE

## 2021-02-21 ENCOUNTER — APPOINTMENT (EMERGENCY)
Dept: CT IMAGING | Facility: HOSPITAL | Age: 76
End: 2021-02-21
Payer: MEDICARE

## 2021-02-21 ENCOUNTER — HOSPITAL ENCOUNTER (EMERGENCY)
Facility: HOSPITAL | Age: 76
End: 2021-02-21
Attending: EMERGENCY MEDICINE | Admitting: EMERGENCY MEDICINE
Payer: MEDICARE

## 2021-02-21 ENCOUNTER — APPOINTMENT (EMERGENCY)
Dept: MRI IMAGING | Facility: HOSPITAL | Age: 76
End: 2021-02-21
Payer: MEDICARE

## 2021-02-21 ENCOUNTER — APPOINTMENT (EMERGENCY)
Dept: RADIOLOGY | Facility: HOSPITAL | Age: 76
End: 2021-02-21
Payer: MEDICARE

## 2021-02-21 VITALS
OXYGEN SATURATION: 98 % | BODY MASS INDEX: 22.89 KG/M2 | TEMPERATURE: 98.1 F | WEIGHT: 121.25 LBS | SYSTOLIC BLOOD PRESSURE: 158 MMHG | RESPIRATION RATE: 16 BRPM | DIASTOLIC BLOOD PRESSURE: 70 MMHG | HEART RATE: 92 BPM | HEIGHT: 61 IN

## 2021-02-21 DIAGNOSIS — I61.9 ICH (INTRACEREBRAL HEMORRHAGE) (HCC): ICD-10-CM

## 2021-02-21 DIAGNOSIS — I61.6: ICD-10-CM

## 2021-02-21 DIAGNOSIS — I61.5 IVH (INTRAVENTRICULAR HEMORRHAGE) (HCC): ICD-10-CM

## 2021-02-21 DIAGNOSIS — I62.9 INTRACRANIAL HEMORRHAGE (HCC): Primary | ICD-10-CM

## 2021-02-21 DIAGNOSIS — I10 HYPERTENSION, UNSPECIFIED TYPE: ICD-10-CM

## 2021-02-21 DIAGNOSIS — I62.00 SUBDURAL HEMORRHAGE (HCC): Primary | ICD-10-CM

## 2021-02-21 PROBLEM — R51.9 HEADACHE: Status: ACTIVE | Noted: 2021-02-21

## 2021-02-21 LAB
ALBUMIN SERPL BCP-MCNC: 4.2 G/DL (ref 3.5–5)
ALP SERPL-CCNC: 66 U/L (ref 46–116)
ALT SERPL W P-5'-P-CCNC: 25 U/L (ref 12–78)
ANION GAP SERPL CALCULATED.3IONS-SCNC: 11 MMOL/L (ref 4–13)
AST SERPL W P-5'-P-CCNC: 20 U/L (ref 5–45)
BASOPHILS # BLD AUTO: 0.02 THOUSANDS/ΜL (ref 0–0.1)
BASOPHILS NFR BLD AUTO: 0 % (ref 0–1)
BILIRUB SERPL-MCNC: 0.72 MG/DL (ref 0.2–1)
BUN SERPL-MCNC: 18 MG/DL (ref 5–25)
CALCIUM SERPL-MCNC: 9.7 MG/DL (ref 8.3–10.1)
CHLORIDE SERPL-SCNC: 102 MMOL/L (ref 100–108)
CO2 SERPL-SCNC: 24 MMOL/L (ref 21–32)
CREAT SERPL-MCNC: 1.1 MG/DL (ref 0.6–1.3)
EOSINOPHIL # BLD AUTO: 0.11 THOUSAND/ΜL (ref 0–0.61)
EOSINOPHIL NFR BLD AUTO: 1 % (ref 0–6)
ERYTHROCYTE [DISTWIDTH] IN BLOOD BY AUTOMATED COUNT: 12.5 % (ref 11.6–15.1)
GFR SERPL CREATININE-BSD FRML MDRD: 49 ML/MIN/1.73SQ M
GLUCOSE SERPL-MCNC: 127 MG/DL (ref 65–140)
HCT VFR BLD AUTO: 44.1 % (ref 34.8–46.1)
HGB BLD-MCNC: 14 G/DL (ref 11.5–15.4)
IMM GRANULOCYTES # BLD AUTO: 0.03 THOUSAND/UL (ref 0–0.2)
IMM GRANULOCYTES NFR BLD AUTO: 0 % (ref 0–2)
INR PPP: 1 (ref 0.84–1.19)
LIPASE SERPL-CCNC: 184 U/L (ref 73–393)
LYMPHOCYTES # BLD AUTO: 1.83 THOUSANDS/ΜL (ref 0.6–4.47)
LYMPHOCYTES NFR BLD AUTO: 22 % (ref 14–44)
MAGNESIUM SERPL-MCNC: 2.4 MG/DL (ref 1.6–2.6)
MCH RBC QN AUTO: 28.9 PG (ref 26.8–34.3)
MCHC RBC AUTO-ENTMCNC: 31.7 G/DL (ref 31.4–37.4)
MCV RBC AUTO: 91 FL (ref 82–98)
MONOCYTES # BLD AUTO: 0.82 THOUSAND/ΜL (ref 0.17–1.22)
MONOCYTES NFR BLD AUTO: 10 % (ref 4–12)
NEUTROPHILS # BLD AUTO: 5.49 THOUSANDS/ΜL (ref 1.85–7.62)
NEUTS SEG NFR BLD AUTO: 67 % (ref 43–75)
NRBC BLD AUTO-RTO: 0 /100 WBCS
PLATELET # BLD AUTO: 279 THOUSANDS/UL (ref 149–390)
PMV BLD AUTO: 10.8 FL (ref 8.9–12.7)
POTASSIUM SERPL-SCNC: 4 MMOL/L (ref 3.5–5.3)
PROT SERPL-MCNC: 7.5 G/DL (ref 6.4–8.2)
PROTHROMBIN TIME: 13.3 SECONDS (ref 11.6–14.5)
RBC # BLD AUTO: 4.84 MILLION/UL (ref 3.81–5.12)
SODIUM SERPL-SCNC: 137 MMOL/L (ref 136–145)
TROPONIN I SERPL-MCNC: <0.02 NG/ML
WBC # BLD AUTO: 8.3 THOUSAND/UL (ref 4.31–10.16)

## 2021-02-21 PROCEDURE — 93005 ELECTROCARDIOGRAM TRACING: CPT

## 2021-02-21 PROCEDURE — 71045 X-RAY EXAM CHEST 1 VIEW: CPT

## 2021-02-21 PROCEDURE — 99285 EMERGENCY DEPT VISIT HI MDM: CPT

## 2021-02-21 PROCEDURE — NC001 PR NO CHARGE: Performed by: NEUROLOGICAL SURGERY

## 2021-02-21 PROCEDURE — 83735 ASSAY OF MAGNESIUM: CPT | Performed by: EMERGENCY MEDICINE

## 2021-02-21 PROCEDURE — 83690 ASSAY OF LIPASE: CPT | Performed by: EMERGENCY MEDICINE

## 2021-02-21 PROCEDURE — 96365 THER/PROPH/DIAG IV INF INIT: CPT

## 2021-02-21 PROCEDURE — 96367 TX/PROPH/DG ADDL SEQ IV INF: CPT

## 2021-02-21 PROCEDURE — 99291 CRITICAL CARE FIRST HOUR: CPT | Performed by: EMERGENCY MEDICINE

## 2021-02-21 PROCEDURE — 80053 COMPREHEN METABOLIC PANEL: CPT | Performed by: EMERGENCY MEDICINE

## 2021-02-21 PROCEDURE — G1004 CDSM NDSC: HCPCS

## 2021-02-21 PROCEDURE — 70553 MRI BRAIN STEM W/O & W/DYE: CPT

## 2021-02-21 PROCEDURE — A9585 GADOBUTROL INJECTION: HCPCS | Performed by: EMERGENCY MEDICINE

## 2021-02-21 PROCEDURE — 85025 COMPLETE CBC W/AUTO DIFF WBC: CPT | Performed by: EMERGENCY MEDICINE

## 2021-02-21 PROCEDURE — 85610 PROTHROMBIN TIME: CPT | Performed by: EMERGENCY MEDICINE

## 2021-02-21 PROCEDURE — 84484 ASSAY OF TROPONIN QUANT: CPT | Performed by: EMERGENCY MEDICINE

## 2021-02-21 PROCEDURE — 36415 COLL VENOUS BLD VENIPUNCTURE: CPT | Performed by: EMERGENCY MEDICINE

## 2021-02-21 PROCEDURE — 70450 CT HEAD/BRAIN W/O DYE: CPT

## 2021-02-21 PROCEDURE — 99223 1ST HOSP IP/OBS HIGH 75: CPT | Performed by: ANESTHESIOLOGY

## 2021-02-21 PROCEDURE — 96361 HYDRATE IV INFUSION ADD-ON: CPT

## 2021-02-21 RX ORDER — LABETALOL 20 MG/4 ML (5 MG/ML) INTRAVENOUS SYRINGE
10 EVERY 4 HOURS PRN
Status: DISCONTINUED | OUTPATIENT
Start: 2021-02-21 | End: 2021-02-22

## 2021-02-21 RX ORDER — ACETAMINOPHEN 325 MG/1
650 TABLET ORAL EVERY 6 HOURS PRN
Status: DISCONTINUED | OUTPATIENT
Start: 2021-02-21 | End: 2021-02-23 | Stop reason: HOSPADM

## 2021-02-21 RX ORDER — AMLODIPINE BESYLATE 5 MG/1
5 TABLET ORAL DAILY
Status: DISCONTINUED | OUTPATIENT
Start: 2021-02-22 | End: 2021-02-21

## 2021-02-21 RX ORDER — PRAVASTATIN SODIUM 40 MG
40 TABLET ORAL
Status: DISCONTINUED | OUTPATIENT
Start: 2021-02-21 | End: 2021-02-23 | Stop reason: HOSPADM

## 2021-02-21 RX ORDER — AMLODIPINE BESYLATE 5 MG/1
5 TABLET ORAL DAILY
COMMUNITY
End: 2021-03-03 | Stop reason: SDUPTHER

## 2021-02-21 RX ORDER — CHLORHEXIDINE GLUCONATE 0.12 MG/ML
15 RINSE ORAL EVERY 12 HOURS SCHEDULED
Status: DISCONTINUED | OUTPATIENT
Start: 2021-02-21 | End: 2021-02-21

## 2021-02-21 RX ORDER — AMLODIPINE BESYLATE 5 MG/1
5 TABLET ORAL
Status: DISCONTINUED | OUTPATIENT
Start: 2021-02-21 | End: 2021-02-23 | Stop reason: HOSPADM

## 2021-02-21 RX ADMIN — GADOBUTROL 5 ML: 604.72 INJECTION INTRAVENOUS at 15:36

## 2021-02-21 RX ADMIN — SODIUM CHLORIDE 7.5 MG/HR: 0.9 INJECTION, SOLUTION INTRAVENOUS at 20:52

## 2021-02-21 RX ADMIN — DESMOPRESSIN ACETATE 11.2 MCG: 4 SOLUTION INTRAVENOUS at 14:19

## 2021-02-21 RX ADMIN — SODIUM CHLORIDE 500 ML: 0.9 INJECTION, SOLUTION INTRAVENOUS at 11:48

## 2021-02-21 RX ADMIN — NICARDIPINE HYDROCHLORIDE 2.5 MG/HR: 2.5 INJECTION, SOLUTION INTRAVENOUS at 15:56

## 2021-02-21 RX ADMIN — PRAVASTATIN SODIUM 40 MG: 40 TABLET ORAL at 20:07

## 2021-02-21 RX ADMIN — SODIUM CHLORIDE 13 MG/HR: 0.9 INJECTION, SOLUTION INTRAVENOUS at 18:45

## 2021-02-21 RX ADMIN — AMLODIPINE BESYLATE 5 MG: 5 TABLET ORAL at 22:07

## 2021-02-21 NOTE — EMTALA/ACUTE CARE TRANSFER
[address] [place] [address]  Dept: [phone]      EMTALA TRANSFER CONSENT    NAME Valeria Dakin                                         1945                              MRN [id number]    I have been informed of my rights regarding examination, treatment, and transfer   by Dr Keyona Dickinson MD    Benefits: Specialized equipment and/or services available at the receiving facility (Include comment)________________________(Neurosurgery)    Risks: Potential for delay in receiving treatment, Potential deterioration of medical condition, Loss of IV, Increased discomfort during transfer, Possible worsening of condition or death during transfer      Consent for Transfer:  I acknowledge that my medical condition has been evaluated and explained to me by the emergency department physician or other qualified medical person and/or my attending physician, who has recommended that I be transferred to the service of  Accepting Physician: Dr Venkat Obregon at [address] [place] [address] Name, [address] : [facility]  The above potential benefits of such transfer, the potential risks associated with such transfer, and the probable risks of not being transferred have been explained to me, and I fully understand them  The doctor has explained that, in my case, the benefits of transfer outweigh the risks  I agree to be transferred  I authorize the performance of emergency medical procedures and treatments upon me in both transit and upon arrival at the receiving facility  Additionally, I authorize the release of any and all medical records to the receiving facility and request they be transported with me, if possible  I understand that the safest mode of transportation during a medical emergency is an ambulance and that the Hospital advocates the use of this mode of transport   Risks of traveling to the receiving facility by car, including absence of medical control, life sustaining equipment, such as oxygen, and medical personnel has been explained to me and I fully understand them  (LISETH CORRECT BOX BELOW)  [  ]  I consent to the stated transfer and to be transported by ambulance/helicopter  [  ]  I consent to the stated transfer, but refuse transportation by ambulance and accept full responsibility for my transportation by car  I understand the risks of non-ambulance transfers and I exonerate the Hospital and its staff from any deterioration in my condition that results from this refusal     X___________________________________________    DATE  21  TIME________  Signature of patient or legally responsible individual signing on patient behalf           RELATIONSHIP TO PATIENT_________________________          Provider Certification    NAME Hank Urena                                         1945                              MRN [id number]    A medical screening exam was performed on the above named patient  Based on the examination:    Condition Necessitating Transfer The encounter diagnosis was Intracranial hemorrhage ([address] )      Patient Condition: The patient has been stabilized such that within reasonable medical probability, no material deterioration of the patient condition or the condition of the unborn child(sheba) is likely to result from the transfer    Reason for Transfer: Level of Care needed not available at this facility(Neurosurgery)    Transfer Requirements: Facility [facility]   · Space available and qualified personnel available for treatment as acknowledged by    · Agreed to accept transfer and to provide appropriate medical treatment as acknowledged by       Dr Kieran Emanuel  · Appropriate medical records of the examination and treatment of the patient are provided at the time of transfer   [address] _______  · Transfer will be performed by qualified personnel from    and appropriate transfer equipment as required, including the use of necessary and appropriate life support measures  Provider Certification: I have examined the patient and explained the following risks and benefits of being transferred/refusing transfer to the patient/family:  General risk, such as traffic hazards, adverse weather conditions, rough terrain or turbulence, possible failure of equipment (including vehicle or aircraft), or consequences of actions of persons outside the control of the transport personnel, Unanticipated needs of medical equipment and personnel during transport, Risk of worsening condition, The possibility of a transport vehicle being unavailable      Based on these reasonable risks and benefits to the patient and/or the unborn child(sheba), and based upon the information available at the time of the patients examination, I certify that the medical benefits reasonably to be expected from the provision of appropriate medical treatments at another medical facility outweigh the increasing risks, if any, to the individuals medical condition, and in the case of labor to the unborn child, from effecting the transfer      X____________________________________________ DATE 02/21/21        TIME_______      ORIGINAL - SEND TO MEDICAL RECORDS   COPY - SEND WITH PATIENT DURING TRANSFER

## 2021-02-21 NOTE — H&P
H&P Exam - [address] [facility] 67 y o  female MRN: [id number]  Unit/Bed#: ICU 12 Encounter: [id number]      -------------------------------------------------------------------------------------------------------------  Chief Complaint:  Headaches, vision changes    History of Present Illness   Jem Lawrence is a 67 y o  female with past medical history significant for hypertension and hyperlipidemia who presents with vision changes and headache  Patient states vision changes started approximately 3 days ago, reports vision becoming blurry  She also reports headache which she first noticed yesterday  Patient presented to [facility]  There CT head revealed subdural hemorrhage and right occipital IPH  MRI also revealed right occipital IPH  At time of my examination patient reports resolution of her symptoms    History obtained from chart review and the patient   -------------------------------------------------------------------------------------------------------------  Assessment and Plan:    Neuro:    Diagnosis:  Subdural hemorrhage, occipital intraparenchymal hemorrhage, headaches  o Plan:   o MRI revealed Unchanged right occipital intraparenchymal hemorrhage measuring 2 4 x 1 4 x 1 6 cm   No underlying mass or area of enhancement is not apparent   o Neurosurgery consult  o SBP goal less than 140  o Neurochecks  o Fall precautions  o Cardene drip  o Tylenol for headaches      CV:    Diagnosis:  Hypertension, hyperlipidemia  o Plan:   o Continue statin  o SBP goal less than 140  o Cardene drip  o Restart home amlodipine      Pulm:   Diagnosis:  No acute issues  o Plan:   o       GI:    Diagnosis:   o Plan:   o Clear liquid diet advance as tolerated      :    Diagnosis:  No acute issues  o Plan:   o Monitor creatinine  o Monitor urine output      F/E/N:    Plan:    Not on maintenance fluids   Monitor and replete electrolytes as needed   Clear liquid diet, advance as tolerated      Heme/Onc:    Diagnosis:  No acute issues  o Plan:   o SCDs for DVT prophylaxis      Endo:    Diagnosis:  No acute issues  o Plan:   o         ID:    Diagnosis:  No acute issues  o Plan:  o       MSK/Skin:    Diagnosis:   o Plan:   o PT/OT      Disposition: Admit to Critical Care   Code Status: Level 1 - Full Code  --------------------------------------------------------------------------------------------------------------  Review of Systems   Constitutional: Negative  HENT: Negative  Eyes: Positive for visual disturbance  Negative for photophobia, pain, discharge, redness and itching  Respiratory: Negative  Cardiovascular: Negative  Gastrointestinal: Negative  Genitourinary: Negative  Musculoskeletal: Negative  Skin: Negative  Neurological: Positive for headaches  Negative for seizures, speech difficulty, light-headedness and numbness  Hematological: Negative  Psychiatric/Behavioral: Negative  A 12-point, complete review of systems was reviewed and negative except as stated above     Physical Exam  Constitutional:       Appearance: Normal appearance  She is normal weight  HENT:      Head: Normocephalic  Nose: Nose normal    Neck:      Musculoskeletal: Normal range of motion  Cardiovascular:      Rate and Rhythm: Normal rate and regular rhythm  Pulses: Normal pulses  Heart sounds: Normal heart sounds  Pulmonary:      Effort: Pulmonary effort is normal       Breath sounds: Normal breath sounds  Abdominal:      General: Abdomen is flat  Bowel sounds are normal       Palpations: Abdomen is soft  Musculoskeletal: Normal range of motion  Skin:     General: Skin is warm and dry  Neurological:      General: No focal deficit present  Mental Status: She is alert and oriented to person, place, and time  Mental status is at baseline     Psychiatric:         Mood and Affect: Mood normal          Behavior: Behavior normal          Thought Content: Thought content normal          Judgment: Judgment normal        --------------------------------------------------------------------------------------------------------------  Vitals:   Vitals:    02/21/21 1737   BP: 167/60   BP Location: Right arm   Pulse: (!) 116   SpO2: 98%   Height: 5' 1" (1 549 m)     Temp  Min: 98 1 °F (36 7 °C)  Max: 98 1 °F (36 7 °C)  IBW: 47 8 kg  Height: 5' 1" (154 9 cm)  Body mass index is 22 91 kg/m²  Laboratory and Diagnostics:  Results from last 7 days   Lab Units 02/21/21  1137   WBC Thousand/uL 8 30   HEMOGLOBIN g/dL 14 0   HEMATOCRIT % 44 1   PLATELETS Thousands/uL 279   NEUTROS PCT % 67   MONOS PCT % 10     Results from last 7 days   Lab Units 02/21/21  1137   SODIUM mmol/L 137   POTASSIUM mmol/L 4 0   CHLORIDE mmol/L 102   CO2 mmol/L 24   ANION GAP mmol/L 11   BUN mg/dL 18   CREATININE mg/dL 1 10   CALCIUM mg/dL 9 7   GLUCOSE RANDOM mg/dL 127   ALT U/L 25   AST U/L 20   ALK PHOS U/L 66   ALBUMIN g/dL 4 2   TOTAL BILIRUBIN mg/dL 0 72     Results from last 7 days   Lab Units 02/21/21  1137   MAGNESIUM mg/dL 2 4      Results from last 7 days   Lab Units 02/21/21  1333   INR  1 00      Results from last 7 days   Lab Units 02/21/21  1137   TROPONIN I ng/mL <0 02         ABG:    VBG:          Micro:        EKG:   Imaging: I have personally reviewed pertinent reports        Historical Information   Past Medical History:   Diagnosis Date    Glaucoma     High blood pressure 11/30/2018    Panic attacks 1998    Papanicolaou smear 03/17/2016    NEG    Post-menopausal      Past Surgical History:   Procedure Laterality Date    COLONOSCOPY  2010    MAMMO (HISTORICAL) Bilateral 01/21/2019    No evidence of malignancy    TONSILLECTOMY      VAGINAL DELIVERY      X4     Social History   Social History     Substance and Sexual Activity   Alcohol Use Yes    Comment: social     Social History     Substance and Sexual Activity   Drug Use No     Social History     Tobacco Use Smoking Status Never Smoker   Smokeless Tobacco Never Used     Exercise History:   Family History:   Family History   Problem Relation Age of Onset    Heart disease Sister      I have reviewed this patient's family history and commented on sigificant items within the HPI      Medications:  Current Facility-Administered Medications   Medication Dose Route Frequency    chlorhexidine (PERIDEX) 0 12 % oral rinse 15 mL  15 mL Swish [employer] Spit Q12H [address]    niCARdipine (CARDENE) 25 mg (STANDARD CONCENTRATION) in sodium chloride 0 9% 250 mL  1-15 mg/hr Intravenous Titrated     Home medications:  Prior to Admission Medications   Prescriptions Last Dose Informant Patient Reported? Taking? amLODIPine-atorvastatin (CADUET) 5-10 MG per tablet  Self Yes No   Sig: Take 1 tablet by mouth daily   simvastatin (ZOCOR) 20 mg tablet  Self Yes No   Sig: Take 20 mg by mouth daily at bedtime      Facility-Administered Medications: None     Allergies: Allergies   Allergen Reactions    Codeine     Sulfa Antibiotics      ------------------------------------------------------------------------------------------------------------  Advance Directive and Living Will:      Power of :    POLST:    ------------------------------------------------------------------------------------------------------------  Anticipated Length of Stay is > 2 midnights    Care Time Delivered:   Upon my evaluation, this patient had a high probability of imminent or life-threatening deterioration due to Subdural hemorrhage, occipital hemorrhage, which required my direct attention, intervention, and personal management  I have personally provided 45 minutes  of critical care time, exclusive of procedures, teaching, family meetings, and any prior time recorded by providers other than myself  Stephanie Diaz DO        Portions of the record may have been created with voice recognition software    Occasional wrong word or "sound a like" substitutions may have occurred due to the inherent limitations of voice recognition software    Read the chart carefully and recognize, using context, where substitutions have occurred

## 2021-02-21 NOTE — PLAN OF CARE
Problem: Prexisting or High Potential for Compromised Skin Integrity  Goal: Skin integrity is maintained or improved  Description: INTERVENTIONS:  - Identify patients at risk for skin breakdown  - Assess and monitor skin integrity  - Assess and monitor nutrition and hydration status  - Monitor labs   - Assess for incontinence   - Turn and reposition patient  - Assist with mobility/ambulation  - Relieve pressure over bony prominences  - Avoid friction and shearing  - Provide appropriate hygiene as needed including keeping skin clean and dry  - Evaluate need for skin moisturizer/barrier cream  - Collaborate with interdisciplinary team   - Patient/family teaching  - Consider wound care consult   Outcome: Progressing     Problem: Potential for Falls  Goal: Patient will remain free of falls  Description: INTERVENTIONS:  - Assess patient frequently for physical needs  -  Identify cognitive and physical deficits and behaviors that affect risk of falls  -  [place] fall precautions as indicated by assessment   - Educate patient/family on patient safety including physical limitations  - Instruct patient to call for assistance with activity based on assessment  - Modify environment to reduce risk of injury  - Consider OT/PT consult to assist with strengthening/mobility  Outcome: Progressing     Problem: Neurological Deficit  Goal: Neurological status is stable or improving  Description: Interventions:  - Monitor and assess patient's level of consciousness, motor function, sensory function, and level of assistance needed for ADLs  - Monitor and report changes from baseline  Collaborate with interdisciplinary team to initiate plan and implement interventions as ordered  - Provide and maintain a safe environment  - Consider seizure precautions  - Consider fall precautions  - Consider aspiration precautions  - Consider bleeding precautions  Outcome: Progressing     Problem:  Activity Intolerance/Impaired Mobility  Goal: Mobility/activity is maintained at optimum level for patient  Description: Interventions:  - Assess and monitor patient  barriers to mobility and need for assistive/adaptive devices  - Assess patient's emotional response to limitations  - Collaborate with interdisciplinary team and initiate plans and interventions as ordered  - Encourage independent activity per ability   - Maintain proper body alignment  - Perform active/passive rom as tolerated/ordered  - Plan activities to conserve energy   - Turn patient as appropriate  Outcome: Progressing     Problem: Communication Impairment  Goal: Ability to express needs and understand communication  Description: Assess patient's communication skills and ability to understand information  Patient will demonstrate use of effective communication techniques, alternative methods of communication and understanding even if not able to speak  - Encourage communication and provide alternate methods of communication as needed  - Collaborate with case management/ for discharge needs  - Include patient/family/caregiver in decisions related to communication  Outcome: Progressing     Problem: Potential for Aspiration  Goal: Non-ventilated patient's risk of aspiration is minimized  Description: Assess and monitor vital signs, respiratory status, and labs (WBC)  Monitor for signs of aspiration (tachypnea, cough, rales, wheezing, cyanosis, fever)  - Assess and monitor patient's ability to swallow  - Place patient up in chair to eat if possible  - HOB up at 90 degrees to eat if unable to get patient up into chair   - Supervise patient during oral intake  - Instruct patient/ family to take small bites  - Instruct patient/ family to take small single sips when taking liquids    - Follow patient-specific strategies generated by speech pathologist   Outcome: Progressing  Goal: Ventilated patient's risk of aspiration is minimized  Description: Assess and monitor vital signs, respiratory status, airway cuff pressure, and labs (WBC)  Monitor for signs of aspiration (tachypnea, cough, rales, wheezing, cyanosis, fever)  - Elevate head of bed 30 degrees if patient has tube feeding   - Monitor tube feeding  Outcome: Progressing     Problem: Nutrition  Goal: Nutrition/Hydration status is improving  Description: Monitor and assess patient's nutrition/hydration status for malnutrition (ex- brittle hair, bruises, dry skin, pale skin and conjunctiva, muscle wasting, smooth red tongue, and disorientation)  Collaborate with interdisciplinary team and initiate plan and interventions as ordered  Monitor patient's weight and dietary intake as ordered or per policy  Utilize nutrition screening tool and intervene per policy  Determine patient's food preferences and provide high-protein, high-caloric foods as appropriate  - Assist patient with eating   - Allow adequate time for meals   - Encourage patient to take dietary supplement as ordered  - Collaborate with clinical nutritionist   - Include patient/family/caregiver in decisions related to nutrition    Outcome: Progressing     Problem: PAIN - ADULT  Goal: Verbalizes/displays adequate comfort level or baseline comfort level  Description: Interventions:  - Encourage patient to monitor pain and request assistance  - Assess pain using appropriate pain scale  - Administer analgesics based on type and severity of pain and evaluate response  - Implement non-pharmacological measures as appropriate and evaluate response  - Consider cultural and social influences on pain and pain management  - Notify physician/advanced practitioner if interventions unsuccessful or patient reports new pain  Outcome: Progressing     Problem: INFECTION - ADULT  Goal: Absence or prevention of progression during hospitalization  Description: INTERVENTIONS:  - Assess and monitor for signs and symptoms of infection  - Monitor lab/diagnostic results  - Monitor all insertion sites, i e  indwelling lines, tubes, and drains  - Monitor endotracheal if appropriate and nasal secretions for changes in amount and color  - [place] appropriate cooling/warming therapies per order  - Administer medications as ordered  - Instruct and encourage patient and family to use good hand hygiene technique  - Identify and instruct in appropriate isolation precautions for identified infection/condition  Outcome: Progressing     Problem: SAFETY ADULT  Goal: Patient will remain free of falls  Description: INTERVENTIONS:  - Assess patient frequently for physical needs  -  Identify cognitive and physical deficits and behaviors that affect risk of falls    -  [place] fall precautions as indicated by assessment   - Educate patient/family on patient safety including physical limitations  - Instruct patient to call for assistance with activity based on assessment  - Modify environment to reduce risk of injury  - Consider OT/PT consult to assist with strengthening/mobility  Outcome: Progressing  Goal: Maintain or return to baseline ADL function  Description: INTERVENTIONS:  -  Assess patient's ability to carry out ADLs; assess patient's baseline for ADL function and identify physical deficits which impact ability to perform ADLs (bathing, care of mouth/teeth, toileting, grooming, dressing, etc )  - Assess/evaluate cause of self-care deficits   - Assess range of motion  - Assess patient's mobility; develop plan if impaired  - Assess patient's need for assistive devices and provide as appropriate  - Encourage maximum independence but intervene and supervise when necessary  - Involve family in performance of ADLs  - Assess for home care needs following discharge   - Consider OT consult to assist with ADL evaluation and planning for discharge  - Provide patient education as appropriate  Outcome: Progressing  Goal: Maintain or return mobility status to optimal level  Description: INTERVENTIONS:  - Assess patient's baseline mobility status (ambulation, transfers, stairs, etc )    - Identify cognitive and physical deficits and behaviors that affect mobility  - Identify mobility aids required to assist with transfers and/or ambulation (gait belt, sit-to-stand, lift, walker, cane, etc )  - [place] fall precautions as indicated by assessment  - Record patient progress and toleration of activity level on Mobility SBAR; progress patient to next Phase/Stage  - Instruct patient to call for assistance with activity based on assessment  - Consider rehabilitation consult to assist with strengthening/weightbearing, etc   Outcome: Progressing     Problem: DISCHARGE PLANNING  Goal: Discharge to home or other facility with appropriate resources  Description: INTERVENTIONS:  - Identify barriers to discharge w/patient and caregiver  - Arrange for needed discharge resources and transportation as appropriate  - Identify discharge learning needs (meds, wound care, etc )  - Arrange for interpretive services to assist at discharge as needed  - Refer to Case Management Department for coordinating discharge planning if the patient needs post-hospital services based on physician/advanced practitioner order or complex needs related to functional status, cognitive ability, or social support system  Outcome: Progressing     Problem: Knowledge Deficit  Goal: Patient/family/caregiver demonstrates understanding of disease process, treatment plan, medications, and discharge instructions  Description: Complete learning assessment and assess knowledge base    Interventions:  - Provide teaching at level of understanding  - Provide teaching via preferred learning methods  Outcome: Progressing     Problem: NEUROSENSORY - ADULT  Goal: Achieves stable or improved neurological status  Description: INTERVENTIONS  - Monitor and report changes in neurological status  - Monitor vital signs such as temperature, blood pressure, glucose, and any other labs ordered   - Initiate measures to prevent increased intracranial pressure  - Monitor for seizure activity and implement precautions if appropriate      Outcome: Progressing  Goal: Remains free of injury related to seizures activity  Description: INTERVENTIONS  - Maintain airway, patient safety  and administer oxygen as ordered  - Monitor patient for seizure activity, document and report duration and description of seizure to physician/advanced practitioner  - If seizure occurs,  ensure patient safety during seizure  - Reorient patient post seizure  - Seizure pads on all 4 side rails  - Instruct patient/family to notify RN of any seizure activity including if an aura is experienced  - Instruct patient/family to call for assistance with activity based on nursing assessment  - Administer anti-seizure medications if ordered    Outcome: Progressing  Goal: Achieves maximal functionality and self care  Description: INTERVENTIONS  - Monitor swallowing and airway patency with patient fatigue and changes in neurological status  - Encourage and assist patient to increase activity and self care     - Encourage visually impaired, hearing impaired and aphasic patients to use assistive/communication devices  Outcome: Progressing

## 2021-02-21 NOTE — ED PROVIDER NOTES
History  Chief Complaint   Patient presents with    Visual Field Change     Pt presenst to ED from home w/ vision changes the past three days  Pt (+) HA, had recent covid vaccine  Pt (+) hx HTN  History provided by:  Patient and relative   used: No         69-year-old female presents emergency department for evaluation of intermittent visual changes  Patient also with intermittent headaches since most recent COVID vaccine on 02/13  Denies any neck pain  Denies any fever  Denies any headache  Denies any speech difficulty  Denies any focal numbness, weakness, tingling in her extremities  Denies any difficulty ambulating  Prior to Admission Medications   Prescriptions Last Dose Informant Patient Reported? Taking? amLODIPine-atorvastatin (CADUET) 5-10 MG per tablet 2/20/2021 at Unknown time Self Yes Yes   Sig: Take 1 tablet by mouth daily   simvastatin (ZOCOR) 20 mg tablet 2/20/2021 at Unknown time Self Yes Yes   Sig: Take 20 mg by mouth daily at bedtime      Facility-Administered Medications: None       Past Medical History:   Diagnosis Date    Glaucoma     High blood pressure 11/30/2018    Panic attacks 1998    Papanicolaou smear 03/17/2016    NEG    Post-menopausal        Past Surgical History:   Procedure Laterality Date    COLONOSCOPY  2010    MAMMO (HISTORICAL) Bilateral 01/21/2019    No evidence of malignancy    TONSILLECTOMY      VAGINAL DELIVERY      X4       Family History   Problem Relation Age of Onset    Heart disease Sister      I have reviewed and agree with the history as documented  E-Cigarette/Vaping     E-Cigarette/Vaping Substances     Social History     Tobacco Use    Smoking status: Never Smoker    Smokeless tobacco: Never Used   Substance Use Topics    Alcohol use: Yes     Comment: social    Drug use: No       Review of Systems   Eyes: Positive for visual disturbance  All other systems reviewed and are negative        Physical Exam  Physical Exam  Vitals signs and nursing note reviewed  Constitutional:       General: She is not in acute distress  Appearance: She is well-developed  HENT:      Head: Normocephalic and atraumatic  Eyes:      Pupils: Pupils are equal, round, and reactive to light  Comments: PERRL, EOMI   Neck:      Musculoskeletal: Normal range of motion and neck supple  Cardiovascular:      Rate and Rhythm: Normal rate and regular rhythm  Heart sounds: Normal heart sounds  No murmur  Pulmonary:      Effort: Pulmonary effort is normal  No respiratory distress  Breath sounds: Normal breath sounds  No wheezing or rales  Abdominal:      General: Bowel sounds are normal  There is no distension  Palpations: Abdomen is soft  Tenderness: There is no abdominal tenderness  There is no guarding or rebound  Musculoskeletal: Normal range of motion  General: No deformity  Lymphadenopathy:      Cervical: No cervical adenopathy  Skin:     Capillary Refill: Capillary refill takes less than 2 seconds  Findings: No erythema or rash  Neurological:      Mental Status: She is alert and oriented to person, place, and time  Cranial Nerves: No cranial nerve deficit  Motor: No abnormal muscle tone  Coordination: Coordination normal       Comments:  Normal strength and sensation bilateral upper lower extremities  Normal coordination, gait  Normal finger-to-nose   Left sided visual field deficit   Psychiatric:         Behavior: Behavior normal          Vital Signs  ED Triage Vitals   Temperature Pulse Respirations Blood Pressure SpO2   02/21/21 1126 02/21/21 1115 02/21/21 1115 02/21/21 1115 02/21/21 1115   98 1 °F (36 7 °C) 89 16 163/85 96 %      Temp Source Heart Rate Source Patient Position - Orthostatic VS BP Location FiO2 (%)   02/21/21 1126 02/21/21 1145 02/21/21 1143 02/21/21 1115 --   Oral Monitor Lying Right arm       Pain Score       02/21/21 1336       No Pain Vitals:    02/21/21 1552 02/21/21 1600 02/21/21 1631 02/21/21 1643   BP: (!) 175/74 (!) 180/75 169/77 158/70   Pulse: 77 84 97 92   Patient Position - Orthostatic VS: Lying Lying Lying Lying         Visual Acuity  Visual Acuity      Most Recent Value   Visual acuity R eye is  20/70   Visual acuity Left eye is  20/25   Visual acuity in both eyes is  20/20   Wearing corrective eyewear/lenses?   Yes   L Pupil Size (mm)  3   R Pupil Size (mm)  3          ED Medications  Medications   sodium chloride 0 9 % bolus 500 mL (0 mL Intravenous Stopped 2/21/21 1348)   desmopressin (DDAVP) 11 2 mcg in sodium chloride 0 9 % 50 mL IVPB (0 mcg/kg × 55 kg Intravenous Stopped 2/21/21 1449)   Gadobutrol injection (SINGLE-DOSE) SOLN 5 mL (5 mL Intravenous Given 2/21/21 1536)       Diagnostic Studies  Results Reviewed     Procedure Component Value Units Date/Time    Protime-INR [[id number]]  (Normal) Collected: 02/21/21 1333    Lab Status: Final result Specimen: Blood from Arm, Left Updated: 02/21/21 1349     Protime 13 3 seconds      INR 1 00    Comprehensive metabolic panel [[id number]] Collected: 02/21/21 1137    Lab Status: Final result Specimen: Blood from Arm, Left Updated: 02/21/21 1218     Sodium 137 mmol/L      Potassium 4 0 mmol/L      Chloride 102 mmol/L      CO2 24 mmol/L      ANION GAP 11 mmol/L      BUN 18 mg/dL      Creatinine 1 10 mg/dL      Glucose 127 mg/dL      Calcium 9 7 mg/dL      AST 20 U/L      ALT 25 U/L      Alkaline Phosphatase 66 U/L      Total Protein 7 5 g/dL      Albumin 4 2 g/dL      Total Bilirubin 0 72 mg/dL      eGFR 49 ml/min/1 73sq m     Narrative:      [place] guidelines for Chronic Kidney Disease (CKD):     Stage 1 with normal or high GFR (GFR > 90 mL/min/1 73 square meters)    Stage 2 Mild CKD (GFR = 60-89 mL/min/1 73 square meters)    Stage 3A Moderate CKD (GFR = 45-59 mL/min/1 73 square meters)    Stage 3B Moderate CKD (GFR = 30-44 mL/min/1 73 square meters)   Stage 4 Severe CKD (GFR = 15-29 mL/min/1 73 square meters)    Stage 5 End Stage CKD (GFR <15 mL/min/1 73 square meters)  Note: GFR calculation is accurate only with a steady state creatinine    Lipase [[id number]]  (Normal) Collected: 02/21/21 1137    Lab Status: Final result Specimen: Blood from Arm, Left Updated: 02/21/21 1218     Lipase 184 u/L     Magnesium [[id number]]  (Normal) Collected: 02/21/21 1137    Lab Status: Final result Specimen: Blood from Arm, Left Updated: 02/21/21 1218     Magnesium 2 4 mg/dL     Troponin I [[id number]]  (Normal) Collected: 02/21/21 1137    Lab Status: Final result Specimen: Blood from Arm, Left Updated: 02/21/21 1159     Troponin I <0 02 ng/mL     CBC and differential [[id number]] Collected: 02/21/21 1137    Lab Status: Final result Specimen: Blood from Arm, Left Updated: 02/21/21 1145     WBC 8 30 Thousand/uL      RBC 4 84 Million/uL      Hemoglobin 14 0 g/dL      Hematocrit 44 1 %      MCV 91 fL      MCH 28 9 pg      MCHC 31 7 g/dL      RDW 12 5 %      MPV 10 8 fL      Platelets 256 Thousands/uL      nRBC 0 /100 WBCs      Neutrophils Relative 67 %      Immat GRANS % 0 %      Lymphocytes Relative 22 %      Monocytes Relative 10 %      Eosinophils Relative 1 %      Basophils Relative 0 %      Neutrophils Absolute 5 49 Thousands/µL      Immature Grans Absolute 0 03 Thousand/uL      Lymphocytes Absolute 1 83 Thousands/µL      Monocytes Absolute 0 82 Thousand/µL      Eosinophils Absolute 0 11 Thousand/µL      Basophils Absolute 0 02 Thousands/µL                  MRI brain w wo contrast   Final Result by Cecilia House MD (02/21 1625)      Unchanged 2 4 x 1 4 x 1 6 cm right occipital hemorrhage  No apparent underlying mass or enhancement  T1, T2 and diffusion hyperintense 15 mm left temporal lesion is unchanged from the recent CT and 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       The study was marked in EPIC for immediate notification  Workstation performed: [id number]         CT head without contrast   Final Result by Nayely Messina MD (02/21 9215)      Right occipital lobe hemorrhage and intraventricular hemorrhage in the occipital horn  Subdural blood along the right tentorium  Additional hypodensity in the left temporal lobe  Given no history of trauma, findings likely represent either hemorrhagic    metastases or stroke  Recommend MRI with contrast           I personally discussed this study with José Miguel Sheikh on 2/21/2021 at 1:19 PM                             Workstation performed: [id number]         XR chest 1 view portable   ED Interpretation by Jorge Shi MD (02/21 1165)   No acute disease                   Procedures  ECG 12 Lead Documentation Only    Date/Time: 2/21/2021 11:23 AM  Performed by: Jorge Shi MD  Authorized by: Jorge Shi MD     Indications / Diagnosis:  Visual changes, epigastric discomfort  ECG reviewed by me, the ED Provider: yes    Patient location:  ED  Rate:     ECG rate:  74    ECG rate assessment: normal    Rhythm:     Rhythm: sinus rhythm    Ectopy:     Ectopy: none    QRS:     QRS axis:  Normal  Conduction:     Conduction: abnormal      Abnormal conduction: incomplete RBBB    ST segments:     ST segments:  Normal  T waves:     T waves: normal      CriticalCare Time  Performed by: Jorge Shi MD  Authorized by: Jorge Shi MD     Critical care provider statement:     Critical care time (minutes):  60    Critical care time was exclusive of:  Separately billable procedures and treating other patients and teaching time    Critical care was necessary to treat or prevent imminent or life-threatening deterioration of the following conditions:  CNS failure or compromise    Critical care was time spent personally by me on the following activities:  Re-evaluation of patient's condition, discussions with consultants, evaluation of patient's response to treatment and examination of patient             ED Course                                           MDM  Number of Diagnoses or Management Options  Intracranial hemorrhage [facility]): new and requires workup  Diagnosis management comments: Patient with intermittent left-sided visual field deficits over the past several days  Intermittent headache, not present at this time  No falls or trauma  EKG unremarkable  Labs at baseline  CT scan concerning for right occipital hemorrhage, not consistent with trauma and concerning for possible bleeding metastasis  Patient with no known primary tumor  She is on aspirin 81 mg daily  Spoke with neurosurgeon, Dr Yanely Xavier who reviewed patient's CT scan, presenting signs and symptoms, past medical history, recommended transfer for q 1 hour neuro checks  Recommended DDAVP, Cardene to keep systolic blood pressure less than 140  Recommended MRI to further evaluate bleed for possible underlying tumor  Discussed w/ ICU physician Dr Bhavani Coello who accepted patient  MRI ordered in ED (MRI brain w/wo per NSU request)    MRI completed but not resulted at time of transfer via EMS to [facility] for higher level of care             Amount and/or Complexity of Data Reviewed  Clinical lab tests: ordered and reviewed  Tests in the radiology section of CPT®: ordered and reviewed  Tests in the medicine section of CPT®: ordered and reviewed  Discuss the patient with other providers: yes    Risk of Complications, Morbidity, and/or Mortality  Presenting problems: high  Diagnostic procedures: high  Management options: high    Patient Progress  Patient progress: stable      Disposition  Final diagnoses:   Intracranial hemorrhage ([address] )     Time reflects when diagnosis was documented in both MDM as applicable and the Disposition within this note     Time User Action Codes Description Comment    2/21/2021  2:35 PM Darlene Mcmillan Add [I62 00] Subdural occipital hemorrhage ([address] )     2/21/2021  2:35 PM Darlene Mcmillan Remove [I62 00] Subdural occipital hemorrhage ([address] )     2/21/2021  2:36 PM Danial Riedel Add [I62 9] Intracranial hemorrhage [facility])       ED Disposition     ED Disposition Condition Date/Time Comment    Transfer to Another Facility-In Network  Sun Feb 21, 2021  2:35 PM Nasir Panchal should be transferred out to \[facility] [place] [facility]\""          MD Documentation      Most Recent Value   Patient Condition  The patient has been stabilized such that within reasonable medical probability, no material deterioration of the patient condition or the condition of the unborn child(sheba) is likely to result from the transfer   Reason for Transfer  Level of Care needed not available at this facility [Neurosurgery]   Benefits of Transfer  Specialized equipment and/or services available at the receiving facility (Include comment)________________________ [Neurosurgery]   Risks of Transfer  Potential for delay in receiving treatment, Potential deterioration of medical condition, Loss of IV, Increased discomfort during transfer, Possible worsening of condition or death during transfer   Accepting Physician  Dr Don Gomez Name, Brianna Urena   Provider Certification  General risk, such as traffic hazards, adverse weather conditions, rough terrain or turbulence, possible failure of equipment (including vehicle or aircraft), or consequences of actions of persons outside the control of the transport personnel, Unanticipated needs of medical equipment and personnel during transport, Risk of worsening condition, The possibility of a transport vehicle being unavailable      RN Documentation      [facility] Name, [address]   \[facility]\""      Follow-up Information    None         Discharge Medication List as of 2/21/2021  4:59 PM      CONTINUE these medications which have NOT CHANGED    Details   amLODIPine-atorvastatin (CADUET) 5-10 MG per tablet Take 1 tablet by mouth daily, Historical Med      simvastatin (ZOCOR) 20 mg tablet Take 20 mg by mouth daily at bedtime, Historical Med           No discharge procedures on file      PDMP Review     None          ED Provider  Electronically Signed by           Argelia Hickey MD  02/21/21 2005

## 2021-02-21 NOTE — ED NOTES
Dr Lyndsey Bingham at pt bedside     Rita Yeung, [facility]  02/21/21 3558

## 2021-02-21 NOTE — ED NOTES
[facility] to transport pt at 1630  To [facility] ICU 12  Dr Rg Minus accepting  Report:  [address], RN  02/21/21 4971

## 2021-02-21 NOTE — PROGRESS NOTES
Spoke with the ER staff concerning this patient  Images reviewed in detail  Briefly is a 71-year-old female who presented with visual changes which improved  Because a family encouragement she went to the emergency department  These symptoms occurred approximately 3 days ago and have been improving of late  She has no severe headache  CT scan of the brain demonstrates a intra cerebral hemorrhage with a region of hypodensity and hyperdensity along the tentorial edge  I recommended an MRI of the brain with without contrast   A CTA may also be necessary based on the results of the MRI      Patient be transferred to [facility] ICU

## 2021-02-22 ENCOUNTER — APPOINTMENT (INPATIENT)
Dept: NON INVASIVE DIAGNOSTICS | Facility: HOSPITAL | Age: 76
DRG: 066 | End: 2021-02-22
Payer: MEDICARE

## 2021-02-22 ENCOUNTER — APPOINTMENT (INPATIENT)
Dept: RADIOLOGY | Facility: HOSPITAL | Age: 76
DRG: 066 | End: 2021-02-22
Payer: MEDICARE

## 2021-02-22 ENCOUNTER — TELEPHONE (OUTPATIENT)
Dept: RADIOLOGY | Facility: HOSPITAL | Age: 76
End: 2021-02-22

## 2021-02-22 VITALS
WEIGHT: 124.12 LBS | OXYGEN SATURATION: 94 % | DIASTOLIC BLOOD PRESSURE: 57 MMHG | BODY MASS INDEX: 23.43 KG/M2 | TEMPERATURE: 98.3 F | HEART RATE: 74 BPM | SYSTOLIC BLOOD PRESSURE: 125 MMHG | HEIGHT: 61 IN | RESPIRATION RATE: 17 BRPM

## 2021-02-22 PROBLEM — I61.9 ICH (INTRACEREBRAL HEMORRHAGE) (HCC): Status: ACTIVE | Noted: 2021-02-22

## 2021-02-22 PROBLEM — I61.5 IVH (INTRAVENTRICULAR HEMORRHAGE) (HCC): Status: ACTIVE | Noted: 2021-02-22

## 2021-02-22 PROBLEM — G93.0 EPIDERMOID CYST OF BRAIN: Status: ACTIVE | Noted: 2021-02-22

## 2021-02-22 LAB
ANION GAP SERPL CALCULATED.3IONS-SCNC: 8 MMOL/L (ref 4–13)
BASOPHILS # BLD AUTO: 0.04 THOUSANDS/ΜL (ref 0–0.1)
BASOPHILS NFR BLD AUTO: 1 % (ref 0–1)
BUN SERPL-MCNC: 12 MG/DL (ref 5–25)
CA-I BLD-SCNC: 1.12 MMOL/L (ref 1.12–1.32)
CALCIUM SERPL-MCNC: 8.7 MG/DL (ref 8.3–10.1)
CHLORIDE SERPL-SCNC: 106 MMOL/L (ref 100–108)
CHOLEST SERPL-MCNC: 184 MG/DL (ref 50–200)
CO2 SERPL-SCNC: 24 MMOL/L (ref 21–32)
CREAT SERPL-MCNC: 0.68 MG/DL (ref 0.6–1.3)
EOSINOPHIL # BLD AUTO: 0.2 THOUSAND/ΜL (ref 0–0.61)
EOSINOPHIL NFR BLD AUTO: 3 % (ref 0–6)
ERYTHROCYTE [DISTWIDTH] IN BLOOD BY AUTOMATED COUNT: 12.5 % (ref 11.6–15.1)
EST. AVERAGE GLUCOSE BLD GHB EST-MCNC: 111 MG/DL
GFR SERPL CREATININE-BSD FRML MDRD: 85 ML/MIN/1.73SQ M
GLUCOSE SERPL-MCNC: 97 MG/DL (ref 65–140)
HBA1C MFR BLD: 5.5 %
HCT VFR BLD AUTO: 38.3 % (ref 34.8–46.1)
HDLC SERPL-MCNC: 74 MG/DL
HGB BLD-MCNC: 12.3 G/DL (ref 11.5–15.4)
IMM GRANULOCYTES # BLD AUTO: 0.02 THOUSAND/UL (ref 0–0.2)
IMM GRANULOCYTES NFR BLD AUTO: 0 % (ref 0–2)
INR PPP: 1.03 (ref 0.84–1.19)
LDLC SERPL CALC-MCNC: 94 MG/DL (ref 0–100)
LYMPHOCYTES # BLD AUTO: 1.49 THOUSANDS/ΜL (ref 0.6–4.47)
LYMPHOCYTES NFR BLD AUTO: 20 % (ref 14–44)
MAGNESIUM SERPL-MCNC: 2.2 MG/DL (ref 1.6–2.6)
MCH RBC QN AUTO: 29.4 PG (ref 26.8–34.3)
MCHC RBC AUTO-ENTMCNC: 32.1 G/DL (ref 31.4–37.4)
MCV RBC AUTO: 91 FL (ref 82–98)
MONOCYTES # BLD AUTO: 1.17 THOUSAND/ΜL (ref 0.17–1.22)
MONOCYTES NFR BLD AUTO: 15 % (ref 4–12)
NEUTROPHILS # BLD AUTO: 4.74 THOUSANDS/ΜL (ref 1.85–7.62)
NEUTS SEG NFR BLD AUTO: 61 % (ref 43–75)
NONHDLC SERPL-MCNC: 110 MG/DL
NRBC BLD AUTO-RTO: 0 /100 WBCS
PHOSPHATE SERPL-MCNC: 2.9 MG/DL (ref 2.3–4.1)
PLATELET # BLD AUTO: 218 THOUSANDS/UL (ref 149–390)
PMV BLD AUTO: 10.4 FL (ref 8.9–12.7)
POTASSIUM SERPL-SCNC: 3.5 MMOL/L (ref 3.5–5.3)
PROTHROMBIN TIME: 13.5 SECONDS (ref 11.6–14.5)
RBC # BLD AUTO: 4.19 MILLION/UL (ref 3.81–5.12)
SODIUM SERPL-SCNC: 138 MMOL/L (ref 136–145)
TRIGL SERPL-MCNC: 78 MG/DL
WBC # BLD AUTO: 7.66 THOUSAND/UL (ref 4.31–10.16)

## 2021-02-22 PROCEDURE — 99223 1ST HOSP IP/OBS HIGH 75: CPT | Performed by: PSYCHIATRY & NEUROLOGY

## 2021-02-22 PROCEDURE — 97166 OT EVAL MOD COMPLEX 45 MIN: CPT

## 2021-02-22 PROCEDURE — 97163 PT EVAL HIGH COMPLEX 45 MIN: CPT

## 2021-02-22 PROCEDURE — 85610 PROTHROMBIN TIME: CPT | Performed by: REGISTERED NURSE

## 2021-02-22 PROCEDURE — 80048 BASIC METABOLIC PNL TOTAL CA: CPT | Performed by: REGISTERED NURSE

## 2021-02-22 PROCEDURE — 70496 CT ANGIOGRAPHY HEAD: CPT

## 2021-02-22 PROCEDURE — 80061 LIPID PANEL: CPT | Performed by: REGISTERED NURSE

## 2021-02-22 PROCEDURE — 82330 ASSAY OF CALCIUM: CPT | Performed by: REGISTERED NURSE

## 2021-02-22 PROCEDURE — 83735 ASSAY OF MAGNESIUM: CPT | Performed by: REGISTERED NURSE

## 2021-02-22 PROCEDURE — 93306 TTE W/DOPPLER COMPLETE: CPT | Performed by: INTERNAL MEDICINE

## 2021-02-22 PROCEDURE — 70498 CT ANGIOGRAPHY NECK: CPT

## 2021-02-22 PROCEDURE — 99223 1ST HOSP IP/OBS HIGH 75: CPT | Performed by: NEUROLOGICAL SURGERY

## 2021-02-22 PROCEDURE — NC001 PR NO CHARGE: Performed by: INTERNAL MEDICINE

## 2021-02-22 PROCEDURE — 99232 SBSQ HOSP IP/OBS MODERATE 35: CPT | Performed by: INTERNAL MEDICINE

## 2021-02-22 PROCEDURE — 85025 COMPLETE CBC W/AUTO DIFF WBC: CPT | Performed by: REGISTERED NURSE

## 2021-02-22 PROCEDURE — 84100 ASSAY OF PHOSPHORUS: CPT | Performed by: REGISTERED NURSE

## 2021-02-22 PROCEDURE — 83036 HEMOGLOBIN GLYCOSYLATED A1C: CPT | Performed by: REGISTERED NURSE

## 2021-02-22 PROCEDURE — 93306 TTE W/DOPPLER COMPLETE: CPT

## 2021-02-22 PROCEDURE — G1004 CDSM NDSC: HCPCS

## 2021-02-22 RX ORDER — LABETALOL 20 MG/4 ML (5 MG/ML) INTRAVENOUS SYRINGE
10 EVERY 4 HOURS PRN
Status: DISCONTINUED | OUTPATIENT
Start: 2021-02-22 | End: 2021-02-23 | Stop reason: HOSPADM

## 2021-02-22 RX ADMIN — AMLODIPINE BESYLATE 5 MG: 5 TABLET ORAL at 21:56

## 2021-02-22 RX ADMIN — IOHEXOL 85 ML: 350 INJECTION, SOLUTION INTRAVENOUS at 17:28

## 2021-02-22 NOTE — PROGRESS NOTES
Pastoral Care Progress Note    2021  Patient: Major Gear : 1945  Admission Date & Time: 2021 1736  MRN: [id number] CSN: [id number]                     Chaplaincy Interventions Utilized:       Ritual: Chidi Metcalf provided sacrament of the sick       21 1100   Clinical Encounter Type   Visited With Patient   Routine Visit Introduction   Crisis Visit Critical Care   Adventist Encounters   Adventist Needs Prayer   Sacramental Encounters   Sacrament of Sick-Anointing Anointed

## 2021-02-22 NOTE — TELEPHONE ENCOUNTER
Called to schedule pt for cta head/ neck    Spoke to Sage Peña, she will have patients nurse Abdi Holloway to call us back to schedule

## 2021-02-22 NOTE — PROGRESS NOTES
Daily Progress Note - Critical Care   Marilyn Benson 67 y o  female MRN: [id number]  Unit/Bed#: ICU 12 Encounter: [id number]        ----------------------------------------------------------------------------------------  HPI/24hr events:  NO events    ---------------------------------------------------------------------------------------  SUBJECTIVE  "Feeling better"    Review of Systems  Review of systems was reviewed and negative unless stated above in HPI/24-hour events   ---------------------------------------------------------------------------------------  Assessment and Plan:    Neuro:   · Diagnosis:  Subdural hemorrhage, occipital intraparenchymal hemorrhage, headaches  ? Plan:   ? CT head: Right occipital lobe hemorrhage and intraventricular hemorrhage in the occipital horn  Subdural blood along the right tentorium  Additional hypodensity in the left temporal lobe  ? MRI revealed Unchanged right occipital intraparenchymal hemorrhage measuring 2 4 x 1 4 x 1 6 cm  No underlying mass or area of enhancement is not apparent  ? ICH: 0   ? Neurosurgery consult  ? SBP goal less than 140   ? Neurochecks  ? low threshold to [facility] and start vEEG  ? Fall precautions  ? Cardene drip - D/c overnight   ? Tylenol for headaches        CV:   · Diagnosis:  Hypertension, hyperlipidemia  ? Plan:   ? Continue statin  ? SBP goal less than 140  ? Cardene drip - d/c overnight   ? Restarted home amlodipine        Pulm:  · Diagnosis:  No acute issues   ? Currently room air         GI:   · Diagnosis:   ? Plan:   ? Clear liquid diet advance as tolerated        :   · Diagnosis:  No acute issues  ? Plan:   ? Monitor creatinine  ? Monitor urine output        F/E/N:   · Not on maintenance fluids - Clear liquid diet   · Monitor and replete electrolytes as needed   · Clear liquid diet, advance as tolerated        Heme/Onc:   · Diagnosis:  No acute issues  ? Plan:   ?  SCDs for DVT prophylaxis        Endo:   · Diagnosis:  No acute issues             ID:   · Diagnosis:  No acute issues       MSK/Skin:   · Diagnosis:   ? Plan:   ? PT/OT      Disposition: Continue Critical Care   Code Status: Level 1 - Full Code  ---------------------------------------------------------------------------------------  ICU CORE MEASURES    Prophylaxis   VTE Pharmacologic Prophylaxis: Pharmacologic VTE Prophylaxis contraindicated due to IPH  VTE Mechanical Prophylaxis: sequential compression device  Stress Ulcer Prophylaxis: Prophylaxis Not Indicated     ABCDE Protocol (if indicated)  Plan to perform spontaneous awakening trial today? Not applicable  Plan to perform spontaneous breathing trial today? Not applicable  Obvious barriers to extubation? Not applicable  CAM-ICU: Negative    Invasive Devices Review  Invasive Devices     Peripheral Intravenous Line            Peripheral IV 02/21/21 Left Antecubital less than 1 day    Peripheral IV 02/21/21 Left;Ventral (anterior) Forearm less than 1 day              Can any invasive devices be discontinued today? Not applicable  ---------------------------------------------------------------------------------------  OBJECTIVE    Physical Exam  Physical Exam:   GENERAL: NAD, Normal appearance  Non diaphoretic, non-toxic, not ill-appearing, well-developed, well-nourished  NEUROLOGIC:  Alert/oriented x3  No motor or sensory deficits  HEENT:  NC/AT, PERRL, EOMI, MMM, no scleral icterus  CARDIAC:  RRR, +S1/S2, no S3/S4 heard, no m/g/r  PULMONARY:  non-labored breathing, CTA B/L, no wheezing/rales/rhonci appreciated at time of encounter  ABDOMEN:  Soft, NT/ND, +BS, no rebound/guarding/rigidity  Extremities:  2+ Pulses in DP/PT   No edema, cyanosis, or clubbing  SKIN:  No rashes or erythema    Vitals   Vitals:    02/22/21 0300 02/22/21 0400 02/22/21 0500 02/22/21 0600   BP: 127/50 127/56 119/51 133/59   BP Location: Right arm Right arm Right arm Right arm   Pulse: 70 78 (!) 54 76   Resp: 12 20 (!) 24 17   Temp:   98 6 °F (37 °C)    TempSrc:   Oral    SpO2: 97% 96% 94% 95%   Weight:       Height:         Temp (24hrs), Av 3 °F (36 8 °C), Min:98 1 °F (36 7 °C), Max:98 6 °F (37 °C)  Current: Temperature: 98 6 °F (37 °C)      Respiratory:  SpO2: SpO2: 95 %       Invasive/non-invasive ventilation settings   Respiratory    Lab Data (Last 4 hours)    None         O2/Vent Data (Last 4 hours)    None                Height and Weights   Height: 5' 1" (154 9 cm)  IBW: 47 8 kg  Body mass index is 23 24 kg/m²  Weight (last 2 days)     Date/Time   Weight    21 1742   55 8 (123 02)              Intake and Output  I/O        07 -  0700  07 -  0700  07 -  0700    P  O   1900     I V  (mL/kg)  422 4 (7 6)     Total Intake(mL/kg)  2322 4 (41 6)     Urine (mL/kg/hr)  925     Total Output  925     Net  +1397 4                    Nutrition       Diet Orders   (From admission, onward)             Start     Ordered    21 1833  Diet Clear Liquid  Diet effective now     Question Answer Comment   Diet Type Clear Liquid    RD to adjust diet per protocol?  No        21 1832                  Laboratory and Diagnostics:  Results from last 7 days   Lab Units 21  0529 21  1137   WBC Thousand/uL 7 66 8 30   HEMOGLOBIN g/dL 12 3 14 0   HEMATOCRIT % 38 3 44 1   PLATELETS Thousands/uL 218 279   NEUTROS PCT % 61 67   MONOS PCT % 15* 10     Results from last 7 days   Lab Units 21  0529 21  1137   SODIUM mmol/L 138 137   POTASSIUM mmol/L 3 5 4 0   CHLORIDE mmol/L 106 102   CO2 mmol/L 24 24   ANION GAP mmol/L 8 11   BUN mg/dL 12 18   CREATININE mg/dL 0 68 1 10   CALCIUM mg/dL 8 7 9 7   GLUCOSE RANDOM mg/dL 97 127   ALT U/L  --  25   AST U/L  --  20   ALK PHOS U/L  --  66   ALBUMIN g/dL  --  4 2   TOTAL BILIRUBIN mg/dL  --  0 72     Results from last 7 days   Lab Units 21  0529 21  1137   MAGNESIUM mg/dL 2 2 2 4   PHOSPHORUS mg/dL 2 9  --       Results from last 7 days   Lab Units 02/22/21  0529 02/21/21  1333   INR  1 03 1 00      Results from last 7 days   Lab Units 02/21/21  1137   TROPONIN I ng/mL <0 02         ABG:    VBG:          Micro        Imaging:  I have personally reviewed pertinent reports  Xr Chest 1 View Portable    Result Date: 2/22/2021  Impression: No acute cardiopulmonary disease  Workstation performed: [id number]     Ct Head Without Contrast    Result Date: 2/21/2021  Impression: Right occipital lobe hemorrhage and intraventricular hemorrhage in the occipital horn  Subdural blood along the right tentorium  Additional hypodensity in the left temporal lobe  Given no history of trauma, findings likely represent either hemorrhagic  metastases or stroke  Recommend MRI with contrast   I personally discussed this study with Dallie Spatz on 2/21/2021 at 1:19 PM   Workstation performed: [id number]     Mri Brain W Wo Contrast    Result Date: 2/21/2021  Impression: Unchanged 2 4 x 1 4 x 1 6 cm right occipital hemorrhage  No apparent underlying mass or enhancement  T1, T2 and diffusion hyperintense 15 mm left temporal lesion is unchanged from the recent CT and 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  The study was marked in EPIC for immediate notification   Workstation performed: [id number]       Active Medications  Scheduled Meds:  Current Facility-Administered Medications   Medication Dose Route Frequency Provider Last Rate    acetaminophen  650 mg Oral Q6H PRN Carrie Velásquez,       amLODIPine  5 mg Oral HS JORY Bhatt      Labetalol HCl  10 mg Intravenous Q4H PRN JORY Bhatt      pravastatin  40 mg Oral Daily With [employer], DO       Continuous Infusions:     PRN Meds:   acetaminophen, 650 mg, Q6H PRN  Labetalol HCl, 10 mg, Q4H PRN        Allergies   Allergies   Allergen Reactions    Codeine     Sulfa Antibiotics        Advance Directive and Living Will:      Power of :    POLST: Counseling / Coordination of Care  Total Critical Care time spent 45 minutes excluding procedures, teaching and family updates  Yen Zuniga MD  Internal Medicine Residency, PGY-2  [facility] record may have been created with voice recognition software  Occasional wrong word or "sound a like" substitutions may have occurred due to the inherent limitations of voice recognition software    Read the chart carefully and recognize, using context, where substitutions have occurred

## 2021-02-22 NOTE — ASSESSMENT & PLAN NOTE
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

## 2021-02-22 NOTE — PHYSICAL THERAPY NOTE
Physical Therapy Evaluation     Patient's Name: Suleman Ingram    Admitting Diagnosis  Subdural occipital hemorrhage (HCC) [I62 00]    Problem List  Patient Active Problem List   Diagnosis    [facility]ural hemorrhage ([facility] )    Hypertension    Headache    [facility]int[facility]) ([facility] )    IVH (intraventricular hemorrhage) ([facility] )    Epidermoid cyst of brain       Past Medical History  Past Medical History:   Diagnosis Date    Glaucoma     High blood pressure 11/30/2018    Panic attacks 1998    Papanicolaou smear 03/17/2016    NEG    Post-menopausal        Past Surgical History  Past Surgical History:   Procedure Laterality Date    COLONOSCOPY  2010    MAMMO (HISTORICAL) Bilateral 01/21/2019    No evidence of malignancy    TONSILLECTOMY      VAGINAL DELIVERY      X4           02/22/21 0935   PT Last Visit   PT Visit Date 02/22/21   Note Type   Note type Evaluation   Pain Assessment   Pain Assessment Tool 0-10   Pain Score No Pain   Hospital Pain Intervention(s) Repositioned; Ambulation/increased activity   Home Living   Type of Home House  (bi-level)   Home Layout Two level;Bed/bath upstairs  (6 KENNY, 6 stairs up, 6 steps down)   Bathroom Shower/Tub Tub only   Bathroom Toilet Raised   Bathroom Accessibility Accessible   Prior Function   Level of [place] Independent with ADLs and functional mobility   Lives With Spouse   Receives Help From Family   ADL Assistance Independent   IADLs Independent   Falls in the last 6 months 0   Vocational Retired   Restrictions/Precautions   Wells Tara Bearing Precautions Per Order No   Other Precautions Bed Alarm;Multiple lines;Telemetry; Fall Risk   General   Family/Caregiver Present No   Cognition   Orientation Level Oriented X4   RLE Assessment   RLE Assessment WFL   LLE Assessment   LLE Assessment WFL   Coordination   Sensation WFL   Bed Mobility   Sit to Supine 5  Supervision   Additional items Assist x 1;HOB elevated;Verbal cues   Transfers   Sit to Stand 5 Supervision   Additional items Assist x 1;Verbal cues  (RW for safety)   Stand to Sit 5  Supervision   Additional items Assist x 1;HOB elevated;Verbal cues   Additional Comments Pt found sitting upright in chair upon arrival    Ambulation/Elevation   Gait pattern Narrow DAYLIN; Short stride  (had a few lateral sways with steps)   Gait Assistance 5  Supervision  (RW for safety to start)   Additional items Assist x 1;Verbal cues  (verbal cues for turns and direction)   Assistive Device Rolling walker   Distance 170 ft with RW from start; 50 ft without RW back to room   Stair Management Assistance Not tested  (Need to assess for safe d/c)   Additional items Not tested   Balance   Static Sitting Fair   Dynamic Sitting Fair   Static Standing Fair   Dynamic Standing Fair -   Ambulatory Fair -   Endurance Deficit   Endurance Deficit No   Activity Tolerance   Activity Tolerance Patient tolerated treatment well   Medical Staff Made Aware OT   Nurse Made Aware Yes   Assessment   Prognosis Good   Problem List Impaired balance   Assessment Pt is a 67 y o female who presents to [facility] with vision changes and HA  Pt was transferred from [address] [place] [address] for further testing  CT and MRI on 2/21/2021 revealed R occipital hemorrhage/intraparenchymal hemorrhage  Pt has a PMH of glaucoma and HTN  Pt with active PT orders to assess functional mobility to determine D/C needs  Pt lives with her  in a split level house with 6 KENNY and 6 steps up and down from the main level  PTA, pt was independent with ADLs/IADLs/driving and reports no previous use of DME  Pt required supervision with sit to stand transfer for safety  Pt was given a RW to trial with ambulation but felt like she "didn't need it"  Pt required CG with and without the RW for ambulation secondary to a few lateral sways  Lateral sways were more noticeable without the RW  Pt returned to bed after ambulation with all lines and tubes organized and call bell within reach   The patient's AM-PAC Basic Mobility Inpatient Short Form Raw Score is 23, Standardized Score is 50  88  A standardized score greater than 42 9 suggests the patient may benefit from discharge to home  Please also refer to the recommendation of the Physical Therapist for safe discharge planning  Other objective measures can be found above  Pt is considered to be of moderate complexity secondary to increased fall risk and current diagnosis  At this time, pt would benefit from skilled therapy 3-5x/wk to address gait deviations through standing dynamic balance training until medially cleared for D/C  Pt should have stair mobility assessed to ensure safe return to home enviornment prior to D/C  Barriers to Discharge None   Goals   Patient Goals To go home   STG Expiration Date 03/06/21   Short Term Goal #1 STG1: Pt will be able to progress through dynamic standing balance exercises to decrease occurances of lateral sways while ambulating to ensure safe D/C  STG2: Pt will be able to ambulate 400 ft without an assistive device to meet community distance goals and improve functional independence and safety with mobility  STG3: Pt will be able to ambulate up and down 6 steps for safety upon D/C and return to home enviornment to decrease burden of caregiver and improve functional independence     PT Treatment Day 0   Plan   Treatment/Interventions Functional transfer training;Gait training;Spoke to nursing;Spoke to case management;OT  (dynamic functional balance and stairs)   PT Frequency Other (Comment)  (3-5x/wk)   Recommendation   PT Discharge Recommendation Home with skilled therapy   PT - OK to Discharge No   Additional Comments pending stair mobility, distanced ambulation without AD, and medical clearance   AM-PAC Basic Mobility Inpatient   Turning in Bed Without Bedrails 4   Lying on Back to Sitting on Edge of Flat Bed 4   Moving Bed to Chair 4   Standing Up From Chair 4   Walk in Room 4   Climb 3-5 Stairs 3   Basic Mobility Inpatient Raw Score 23   Basic Mobility Standardized Score 50 88         Elisabet Rene, SPT

## 2021-02-22 NOTE — CASE MANAGEMENT
CM spoke with pt bedside to introduce role and discuss d/c discharge planning  Pt states to reside in [facility] home 6STE with bathroom on 1st flr  Pt lives with her  Will Manley  Prior to admission, pt independent with all ADLs and independently ambulating; no DME  No hx of HHC, no hx of acute rehab, no hx of MH or D&A  Pt's preferred pharmacy is [facility]  Pt has living will  Pt has no POA  Pt drives herself to all medical appointments  Patient states her preferred d/c plan at this time is to go home  CM reviewed d/c planning process including the following: identifying help at home, patient preference for d/c planning needs, Discharge Lounge, [facility] Meds to Bed program, availability of treatment team to discuss questions or concerns patient and/or family may have regarding understanding medications and recognizing signs and symptoms once discharged  CM also encouraged patient to follow up with all recommended appointments after discharge  Patient advised of importance for patient and family to participate in managing patients medical well being

## 2021-02-22 NOTE — CONSULTS
Consultation - Neurology   Valeria Dakin 67 y o  female MRN: [id number]  Unit/Bed#: [facility] 716-01 Encounter: [id number]    Assessment/Plan   IVH (intraventricular hemorrhage) [facility])  Assessment & Plan  76-year-old female with past medical history as listed below, she does have a history of hypertension, hyperlipidemia, glaucoma and cataracts  She has no prior neurological history, she presented to [facility] with complaints of vision disturbance seeing objects in her vision, as well as a headache  CT of the head revealed a occipital ICH and small subdural hemorrhage and minimal IVH extension  The patient was transferred to [facility] for neurosurgical evaluation, she underwent an MRI of the brain which did reveal an unchanged right occipital hemorrhage with no apparent underlying mass or enhancement  There was evidence of T1/T2 and diffusion hyperintensity 15 mm left temporal lesion - 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  · HTN  · Hx of glaucoma/cataract    -  Continue ongoing medical management, continue to monitor for infectious / metabolic derangements, as per medicine team  -  No antiplatelet or anticoagulation medication at this time  -  Recommend systolic blood pressure less than 140  -  Recommend CTA of head with and with out contrast  -  Echo  -  Telemetry  -  Neurological checks notify neurology with any changes in neurological examination, CT of head with any change in neurological examination  -  Plan per Neurosurgery is repeat MRI of brain in the out patient setting for ongoing evaluation, given proximity to MCA brances in the anterior medial L temporal lobe  -   Neurology will continue to follow and advise  Epidermoid cyst of brain  Assessment & Plan  Please see above for details, neurosurgery following, repeat imaging as out patient      History of Present Illness     Reason for Consult / Principal Problem: SDH? ICH    HPI: Sharri Rangel is a 67 y o   female with past medical hx as listed below, she has a hx of glaucoma, cataract, hyperlipidemia and htn  No prior neurological hx, no hx of stroke, no hx of seizure, meningitis, encephalitis, no hx of headaches or migraines  The patient reports she is not a headache person, however, on January 10th she developed a headache  She thought that the headache may have been a start of migraine, never had this before  She reports this resolved  She reports 8 days prior to arrival she noted having problems seeing objects in her vision, she reports she would only see certain parts of objects, it was not in one particular area  She does have long standing vision issues but this was different than what she experienced in the past    She reports she has a hx of glaucoma/cataract and thought it may be related to this, she called her ophthalmologist whom recommended she come to the hospital, she did not come in until the next day  She also noted a headache above her eyes, with the vision changes she thought she was having a migraine, but has no hx of this  She came to the ED at [facility] for evaluation  CT of the head revealed a occipital ICH with small SDH, and minimal IVH extension    She was transferred to [facility] for further evaluation by neurosurgery, she also underwent an MRI of the bradley - which revealed unchanged 2 4 x 1 4 x 1 6 cm right occipital hemorrhage, no apparent underlying mass or enhancement  There was T1/T2 and diffusion hyperintensity 15 mm left temporal lesion is unchanged from the recent CT and 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    The patient was seen by Neurosurgery, it was recommend the patient is blood pressure remain less than 160, there also wanted neurology to evaluate as well and follow  In regards to her epidermoid cyst of brain - it was recommended the patient have a repeat MRI of the brain in the out patient setting and monitor closely  She was taking ASA as an out patient  Currently the patient is comfortable sitting in the chair, she does not have any further headache, she does appear to have vision disturbance consistent with a left VF deficit peripheral   No problems with her speech, no problems with her vision, no problems with one sided weakness or numbness, no problems with ataxia  Inpatient consult to Neurology  Consult performed by: Indigo Bowman PA-C  Consult ordered by: Aman Lisa MD          Review of Systems   Constitutional: Negative  HENT: Negative  Eyes: Positive for visual disturbance  Respiratory: Negative  Cardiovascular: Negative  Gastrointestinal: Negative  Genitourinary: Negative  Musculoskeletal: Negative  Neurological: Negative  Negative for dizziness, tremors, seizures, syncope, facial asymmetry, speech difficulty, weakness, light-headedness, numbness and headaches  Hematological: Negative  Psychiatric/Behavioral: Negative          Historical Information   Past Medical History:   Diagnosis Date    Glaucoma     High blood pressure 11/30/2018    Panic attacks 1998    Papanicolaou smear 03/17/2016    NEG    Post-menopausal      Past Surgical History:   Procedure Laterality Date    COLONOSCOPY  2010    MAMMO (HISTORICAL) Bilateral 01/21/2019    No evidence of malignancy    TONSILLECTOMY      VAGINAL DELIVERY      X4     Social History   Social History     Substance and Sexual Activity   Alcohol Use Yes    Comment: social     Social History     Substance and Sexual Activity   Drug Use No     E-Cigarette/Vaping     E-Cigarette/Vaping Substances     Social History     Tobacco Use   Smoking Status Never Smoker   Smokeless Tobacco Never Used     Family History:   Family History   Problem Relation Age of Onset    Heart disease Sister      Review of previous medical records was completed, no prior neurological records to review  Meds/Allergies   all current active meds have been reviewed, current meds:   Current Facility-Administered Medications   Medication Dose Route Frequency    [MAR Hold] acetaminophen (TYLENOL) tablet 650 mg  650 mg Oral Q6H PRN    [MAR Hold] amLODIPine (NORVASC) tablet 5 mg  5 mg Oral HS    [MAR Hold] Labetalol HCl (NORMODYNE) injection 10 mg  10 mg Intravenous Q4H PRN    [MAR Hold] pravastatin (PRAVACHOL) tablet 40 mg  40 mg Oral Daily With Dinner    and PTA meds:   Prior to Admission Medications   Prescriptions Last Dose Informant Patient Reported? Taking? amLODIPine (NORVASC) 5 mg tablet 2/20/2021 at 2100 Self Yes Yes   Sig: Take 5 mg by mouth daily   simvastatin (ZOCOR) 20 mg tablet 2/20/2021 at 2100 Self Yes Yes   Sig: Take 20 mg by mouth daily at bedtime      Facility-Administered Medications: None       Allergies   Allergen Reactions    Codeine     Sulfa Antibiotics        Objective   Vitals:Blood pressure 125/57, pulse 93, temperature 98 2 °F (36 8 °C), resp  rate 22, height 5' 1" (1 549 m), weight 56 3 kg (124 lb 1 9 oz), SpO2 97 %, not currently breastfeeding  ,Body mass index is 23 45 kg/m²      Intake/Output Summary (Last 24 hours) at 2/22/2021 1546  Last data filed at 2/22/2021 0830  Gross per 24 hour   Intake 2562 42 ml   Output 1275 ml   Net 1287 42 ml     Invasive Devices: Invasive Devices     Peripheral Intravenous Line            Peripheral IV 02/21/21 Left Antecubital 1 day    Peripheral IV 02/21/21 Left;Ventral (anterior) Forearm less than 1 day              Physical Exam  Vitals signs reviewed  Constitutional:       General: She is not in acute distress  Appearance: Normal appearance  She is not ill-appearing, toxic-appearing or diaphoretic  HENT:      Head: Normocephalic and atraumatic  Eyes:      General:         Right eye: No discharge  Left eye: No discharge  Extraocular Movements: Extraocular movements intact and EOM normal       Pupils: Pupils are equal, round, and reactive to light  Neck:      Musculoskeletal: Neck supple  Cardiovascular:      Rate and Rhythm: Normal rate  Pulmonary:      Effort: No respiratory distress  Abdominal:      General: There is no distension  Musculoskeletal:         General: No swelling  Skin:     General: Skin is warm and dry  Neurological:      Mental Status: She is alert and oriented to person, place, and time  Coordination: Finger-Nose-Finger Test and Heel to [facility] [place] Test normal       Deep Tendon Reflexes: Strength normal       Reflex Scores:       Tricep reflexes are 1+ on the right side and 1+ on the left side  Bicep reflexes are 1+ on the right side and 1+ on the left side  Brachioradialis reflexes are 1+ on the right side and 1+ on the left side  Patellar reflexes are 1+ on the right side and 1+ on the left side  Psychiatric:         Speech: Speech normal        Neurologic Exam     Mental Status   Oriented to person, place, and time  Follows 2 step commands  Attention: normal  Concentration: normal    Speech: speech is normal   Level of consciousness: alert  Knowledge: good  Able to perform simple calculations  Able to name object  Able to read  Able to repeat  Able to write  Normal comprehension  Cranial Nerves     CN II   Visual fields full to confrontation       CN III, IV, VI   Pupils are equal, round, and reactive to light  Extraocular motions are normal      CN V   Facial sensation intact  CN VII   Facial expression full, symmetric  CN VIII   CN VIII normal      CN IX, X   CN IX normal    CN X normal      CN XI   CN XI normal      CN XII   CN XII normal    Conjugate gaze     Motor Exam   Muscle bulk: normal  Right arm pronator drift: absent  Left arm pronator drift: absent    Strength   Strength 5/5 throughout  Sensory Exam   Light touch normal    Non focal to light touch or temperature  Gait, Coordination, and Reflexes     Coordination   Finger to nose coordination: normal  Heel to shin coordination: normal    Tremor   Resting tremor: absent  Intention tremor: absent    Reflexes   Right brachioradialis: 1+  Left brachioradialis: 1+  Right biceps: 1+  Left biceps: 1+  Right triceps: 1+  Left triceps: 1+  Right patellar: 1+  Left patellar: 1+  Right plantar: normal  Left plantar: normalNo seizure activity seen        Lab Results:     Recent Results (from the past 224 hour(s))   CBC and differential    Collection Time: 02/21/21 11:37 AM   Result Value Ref Range    WBC 8 30 4 31 - 10 16 Thousand/uL    RBC 4 84 3 81 - 5 12 Million/uL    Hemoglobin 14 0 11 5 - 15 4 g/dL    Hematocrit 44 1 34 8 - 46 1 %    MCV 91 82 - 98 fL    MCH 28 9 26 8 - 34 3 pg    MCHC 31 7 31 4 - 37 4 g/dL    RDW 12 5 11 6 - 15 1 %    MPV 10 8 8 9 - 12 7 fL    Platelets 330 899 - 510 Thousands/uL    nRBC 0 /100 WBCs    Neutrophils Relative 67 43 - 75 %    Immat GRANS % 0 0 - 2 %    Lymphocytes Relative 22 14 - 44 %    Monocytes Relative 10 4 - 12 %    Eosinophils Relative 1 0 - 6 %    Basophils Relative 0 0 - 1 %    Neutrophils Absolute 5 49 1 85 - 7 62 Thousands/µL    Immature Grans Absolute 0 03 0 00 - 0 20 Thousand/uL    Lymphocytes Absolute 1 83 0 60 - 4 47 Thousands/µL    Monocytes Absolute 0 82 0 17 - 1 22 Thousand/µL    Eosinophils Absolute 0 11 0 00 - 0 61 Thousand/µL    Basophils Absolute 0 02 0 00 - 0 10 Thousands/µL   Comprehensive metabolic panel    Collection Time: 02/21/21 11:37 AM   Result Value Ref Range    Sodium 137 136 - 145 mmol/L    Potassium 4 0 3 5 - 5 3 mmol/L    Chloride 102 100 - 108 mmol/L    CO2 24 21 - 32 mmol/L    ANION GAP 11 4 - 13 mmol/L    BUN 18 5 - 25 mg/dL    Creatinine 1 10 0 60 - 1 30 mg/dL    Glucose 127 65 - 140 mg/dL    Calcium 9 7 8 3 - 10 1 mg/dL    AST 20 5 - 45 U/L    ALT 25 12 - 78 U/L    Alkaline Phosphatase 66 46 - 116 U/L    Total Protein 7 5 6 4 - 8 2 g/dL    Albumin 4 2 3 5 - 5 0 g/dL    Total Bilirubin 0 72 0 20 - 1 00 mg/dL    eGFR 49 ml/min/1 73sq m   Lipase    Collection Time: 02/21/21 11:37 AM   Result Value Ref Range    Lipase 184 73 - 393 u/L   Troponin I    Collection Time: 02/21/21 11:37 AM   Result Value Ref Range    Troponin I <0 02 <=0 04 ng/mL   Magnesium    Collection Time: 02/21/21 11:37 AM   Result Value Ref Range    Magnesium 2 4 1 6 - 2 6 mg/dL   Protime-INR    Collection Time: 02/21/21  1:33 PM   Result Value Ref Range    Protime 13 3 11 6 - 14 5 seconds    INR 1 00 0 84 - 9 25   Basic metabolic panel    Collection Time: 02/22/21  5:29 AM   Result Value Ref Range    Sodium 138 136 - 145 mmol/L    Potassium 3 5 3 5 - 5 3 mmol/L    Chloride 106 100 - 108 mmol/L    CO2 24 21 - 32 mmol/L    ANION GAP 8 4 - 13 mmol/L    BUN 12 5 - 25 mg/dL    Creatinine 0 68 0 60 - 1 30 mg/dL    Glucose 97 65 - 140 mg/dL    Calcium 8 7 8 3 - 10 1 mg/dL    eGFR 85 ml/min/1 73sq m   Calcium, ionized    Collection Time: 02/22/21  5:29 AM   Result Value Ref Range    Calcium, Ionized 1 12 1 12 - 1 32 mmol/L   CBC and differential    Collection Time: 02/22/21  5:29 AM   Result Value Ref Range    WBC 7 66 4 31 - 10 16 Thousand/uL    RBC 4 19 3 81 - 5 12 Million/uL    Hemoglobin 12 3 11 5 - 15 4 g/dL    Hematocrit 38 3 34 8 - 46 1 %    MCV 91 82 - 98 fL    MCH 29 4 26 8 - 34 3 pg    MCHC 32 1 31 4 - 37 4 g/dL    RDW 12 5 11 6 - 15 1 %    MPV 10 4 8 9 - 12 7 fL Platelets 576 994 - 065 Thousands/uL    nRBC 0 /100 WBCs    Neutrophils Relative 61 43 - 75 %    Immat GRANS % 0 0 - 2 %    Lymphocytes Relative 20 14 - 44 %    Monocytes Relative 15 (H) 4 - 12 %    Eosinophils Relative 3 0 - 6 %    Basophils Relative 1 0 - 1 %    Neutrophils Absolute 4 74 1 85 - 7 62 Thousands/µL    Immature Grans Absolute 0 02 0 00 - 0 20 Thousand/uL    Lymphocytes Absolute 1 49 0 60 - 4 47 Thousands/µL    Monocytes Absolute 1 17 0 17 - 1 22 Thousand/µL    Eosinophils Absolute 0 20 0 00 - 0 61 Thousand/µL    Basophils Absolute 0 04 0 00 - 0 10 Thousands/µL   Magnesium    Collection Time: 02/22/21  5:29 AM   Result Value Ref Range    Magnesium 2 2 1 6 - 2 6 mg/dL   Phosphorus    Collection Time: 02/22/21  5:29 AM   Result Value Ref Range    Phosphorus 2 9 2 3 - 4 1 mg/dL   Protime-INR    Collection Time: 02/22/21  5:29 AM   Result Value Ref Range    Protime 13 5 11 6 - 14 5 seconds    INR 1 03 0 84 - 1 19   Hemoglobin A1C w/ EAG Estimation    Collection Time: 02/22/21  5:29 AM   Result Value Ref Range    Hemoglobin A1C 5 5 Normal 3 8-5 6%; PreDiabetic 5 7-6 4%; Diabetic >=6 5%; Glycemic control for adults with diabetes <7 0% %     mg/dl   Lipid panel    Collection Time: 02/22/21  5:29 AM   Result Value Ref Range    Cholesterol 184 50 - 200 mg/dL    Triglycerides 78 <=150 mg/dL    HDL, Direct 74 >=40 mg/dL    LDL Calculated 94 0 - 100 mg/dL    Non-HDL-Chol (CHOL-HDL) 110 mg/dl     Per radiology interpertation -  "  Procedure: Xr Chest 1 View Portable    Result Date: 2/22/2021  Narrative: CHEST INDICATION:   epigastric discomfort, visual changes  COMPARISON:  1/11/2007 EXAM PERFORMED/VIEWS:  XR CHEST PORTABLE FINDINGS: Cardiomediastinal silhouette appears unremarkable  The lungs are clear  No pneumothorax or pleural effusion  Osseous structures appear within normal limits for patient age  Impression: No acute cardiopulmonary disease   Workstation performed: [id number]     Procedure: Ct Head Without Contrast    Result Date: 2/21/2021  Narrative: CT BRAIN - WITHOUT CONTRAST INDICATION:   Headache, acute, normal neuro exam Headache, visual changes  COMPARISON:  None  TECHNIQUE:  CT examination of the brain was performed  In addition to axial images, sagittal and coronal 2D reformatted images were created and submitted for interpretation  Radiation dose length product (DLP) for this visit:  866 mGy-cm   This examination, like all CT scans performed in the [facility], was performed utilizing techniques to minimize radiation dose exposure, including the use of iterative reconstruction and automated exposure control  IMAGE QUALITY:  Diagnostic  FINDINGS: PARENCHYMA: Decreased attenuation is noted in periventricular and subcortical white matter demonstrating an appearance that is statistically most likely to represent mild microangiopathic change  Focal hypodensity in the left temporal lobe measuring 1 5  cm  No CT signs of acute infarction  No intracranial mass, mass effect or midline shift  Acute intraventricular hemorrhage and parenchymal hemorrhage in the occipital horn of the right lateral ventricle and right occipital lobe  Subdural blood along the right tentorium  VENTRICLES AND EXTRA-AXIAL SPACES:  Normal for the patient's age  VISUALIZED ORBITS AND PARANASAL SINUSES:  Unremarkable  CALVARIUM AND EXTRACRANIAL SOFT TISSUES:  Normal      Impression: Right occipital lobe hemorrhage and intraventricular hemorrhage in the occipital horn  Subdural blood along the right tentorium  Additional hypodensity in the left temporal lobe  Given no history of trauma, findings likely represent either hemorrhagic  metastases or stroke    Recommend MRI with contrast   I personally discussed this study with Vivek Mendez on 2/21/2021 at 1:19 PM   Workstation performed: [id number]     Procedure: Mri Brain W Wo Contrast    Result Date: 2/21/2021  Narrative: MRI BRAIN WITH AND WITHOUT CONTRAST INDICATION: Occipital infarct, NSU  COMPARISON:  None  TECHNIQUE: Sagittal T1, axial T2, axial FLAIR, axial T1, axial [place], axial diffusion  Sagittal, axial T1 postcontrast   Axial bravo postcontrast with coronal reconstructions  IV Contrast:  5 mL of Gadobutrol injection (SINGLE-DOSE)  IMAGE QUALITY:   Diagnostic  FINDINGS: BRAIN PARENCHYMA:  Unchanged right occipital intraparenchymal hemorrhage measuring 2 4 x 1 4 x 1 6 cm  No underlying mass or area of enhancement is not apparent  Advanced periventricular white matter changes in keeping with chronic small vessel ischemic disease  T1, T2 and diffusion hyperintense 15 mm left temporal lesion is unchanged from the recent CT and 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  Postcontrast imaging of the brain demonstrates no abnormal enhancement  VENTRICLES:  No hydrocephalus  Small amount of hemorrhage is likely present in the occipital horn of the right lateral ventricle  SELLA AND PITUITARY GLAND:  Normal  ORBITS:  Normal  PARANASAL SINUSES:  Normal  VASCULATURE:  Evaluation of the major intracranial vasculature demonstrates appropriate flow voids  CALVARIUM AND SKULL BASE:  Normal  EXTRACRANIAL SOFT TISSUES:  Normal      Impression: Unchanged 2 4 x 1 4 x 1 6 cm right occipital hemorrhage  No apparent underlying mass or enhancement  T1, T2 and diffusion hyperintense 15 mm left temporal lesion is unchanged from the recent CT and 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  The study was marked in EPIC for immediate notification  Workstation performed: [id number]   "  Imaging Studies: I have personally reviewed pertinent reports  EKG, Pathology, and Other Studies: I have personally reviewed pertinent reports  Code Status: Level 1 - Full Code    Counseling / Coordination of Care:  Reviewed prior notes, reviewed PACs  Reviewed with attending

## 2021-02-22 NOTE — TELEPHONE ENCOUNTER
Spoke with Vanessa Ramirez is currently having an ECHO and will be ready for CT around 1430, pt is okay to come by wheelchair for scan  ICU is transferring her out to a floor

## 2021-02-22 NOTE — OCCUPATIONAL THERAPY NOTE
Occupational Therapy Evaluation     Patient Name: Bobby Jain  HVYBS'J Date: 2/22/2021  Problem List  Principal Problem:    Subdural hemorrhage ([facility] )  Active Problems:    Hypertension    Headache    ICH (intracerebral hemorrhage) ([facility])    IVH (intraventricular hemorrhage) ([facility] )    Epidermoid cyst of brain    Past Medical History  Past Medical History:   Diagnosis Date    Glaucoma     High blood pressure 11/30/2018    Panic attacks 1998    Papanicolaou smear 03/17/2016    NEG    Post-menopausal      Past Surgical History  Past Surgical History:   Procedure Laterality Date    COLONOSCOPY  2010    MAMMO (HISTORICAL) Bilateral 01/21/2019    No evidence of malignancy    TONSILLECTOMY      VAGINAL DELIVERY      X4           02/22/21 0934   OT Last Visit   OT Visit Date 02/22/21   Note Type   Note type Evaluation   Restrictions/Precautions   Weight Bearing Precautions Per Order No   Braces or Orthoses Other (Comment)  (None)   Other Precautions Bed Alarm;Multiple lines;Telemetry; Fall Risk   Pain Assessment   Pain Assessment Tool 0-10   Pain Score No Pain   Hospital Pain Intervention(s) Repositioned; Ambulation/increased activity; Emotional support   Home Living   Type of Home House  (Bi-level karli, 6 KENNY, 6 steps up and 6 steps down)   Home Layout Access; Bed/bath upstairs; Two level  (Bi-level, 6 steps up 6 steps down)   Bathroom Shower/Tub Tub only   Bathroom Toilet Raised   Bathroom Equipment Other (Comment)  (None)   [address] Other (Comment)  (No DME)   Additional Comments Pt reports living in bi-level home with 6 KENNY, 6 steps up and 6 steps down; no DME PTA   Prior Function   Level of [place] Independent with ADLs and functional mobility   Lives With Spouse   Receives Help From Family   ADL Assistance Independent   IADLs Independent   Falls in the last 6 months 0   Vocational Retired  (Previously worked @ [employer])   Comments PTA, pt reports being I in ADLs, IADLs, and functional mobility with no use of DME; (+) driving, reports that her  is @ home and can assist with needs prn   Lifestyle   Autonomy PTA, independent in ADLs, IADLs, and functional mobility   Reciprocal Relationships Lives with her  who is home and can assist with needs prn   Service to Others Retired worked @ [employer] Enjoys summer time and gardening   Psychosocial   Psychosocial (WDL) WDL   ADL   Eating Assistance 7  [facility] 7  [address] [facility] 6  [address] 5  [facility]  66  6  Modified independent   [address] [facility]  5  [address] to Supine 5  Supervision   Additional items Assist x 1;HOB elevated; Increased time required;Verbal cues   Additional Comments Pt performed sit to supine with supervision w/ HOB elevated for support; increased time required with verbal cues for safety   Transfers   Sit to Stand 5  Supervision   Additional items Assist x 1; Increased time required;Verbal cues   Stand to Sit 5  Supervision   Additional items Assist x 1;HOB elevated;Verbal cues   Additional Comments Upon arrival, pt found sitting upright in chair; performed sit to stand with supervision and RW for safety; increased time required   Functional Mobility   Functional Mobility 4  Minimal assistance   Additional Comments Pt ambulated household distances with CGA ; started with RW for safety/support but progressed w/o RW and CGA; occasional verbal cues provided for safety   Additional items Rolling walker   Balance   Static Sitting Fair   Dynamic Sitting Fair   Static Standing Fair   Dynamic Standing Fair -   Ambulatory Fair -   Activity Tolerance   Activity Tolerance Patient tolerated treatment well;Patient limited by fatigue Medical Staff Made Aware PT Ryan, [place] Jonnathan Jones   Nurse Made Aware yes, RN cleared for OT session   RUE Assessment   RUE Assessment WFL   LUE Assessment   LUE Assessment WFL   Hand Function   Gross Motor Coordination Functional   Fine Motor Coordination Functional   Sensation   Light Touch No apparent deficits   Vision - Complex Assessment   Ocular Range of Motion WFL   Head Position WDL   Tracking Able to track stimulus in all quads without difficulty   Acuity Able to read clock/calendar on wall without difficulty; Able to read employee name badge without difficulty   Additional Comments Pt reported that her blurry vision had subsided; no difficulty when reading employee name badge & clock on the wall; no deficits noted when testing saccades   Cognition   Overall Cognitive Status [facility]   Arousal/Participation Alert; Responsive;Arousable; Cooperative   Attention Within functional limits   Orientation Level Oriented X4   Memory Within functional limits   Following Commands Follows all commands and directions without difficulty   Comments Pt was pleasant and cooperative; A/O x4; reports no concerns @ this time about going home   Assessment   Limitation Decreased ADL status; Decreased endurance;Decreased self-care trans;Decreased high-level ADLs   Prognosis Fair   Assessment Pt is a 67 y o female who presented to [facility] with vision changes and headaches  CTH revealed subdural hemorrhage and right occipital IPH  Pt was transferred to [facility] for further intervention  Pt  has a past medical history of Glaucoma, High blood pressure (11/30/2018), Panic attacks (1998), Papanicolaou smear (03/17/2016), and Post-menopausal  Pt with active OT orders and is up w/ assistance  OT consulted to assess pt's functional status and occupational performance to determine d/c needs  Pt lives with her  in a bi-level house with 6 KENNY, 6 steps to go up, and 6 steps to go down   PTA, pt was independent in ADLs, IADLs, and functional mobility and reports using no DME  Pt reports, ( + ) driving  Pt is limited by fatigue, decreased strength, generalized weakness, decreased endurance, and an overall decreased independence in ADLs/IADLs/functional mobility  However, at this time, pt is not demonstrating any significant occupational deficits and is functioning at a level of supervision for bed mobility and transfers, Min A (CGA) for functional mobility, modified independence for UB ADLs, and supervision for LB ADLs  From an OT standpoint, recommend discharge to home with increased social support once medically stable  The patient's raw score on the AM-PAC Daily Activity inpatient short form is 22, standardized score is 47 1, greater than 39 4  Patients at this level are likely to benefit from DC to home  Please refer to the recommendation of the Occupational Therapist for safe DC planning  Pt was receptive during the session and was educated on returning home safely with energy conservation techniques and increased social support  At this time, pt does not have any concerns  No further acute care skilled OT services are needed at this time  Recommend continued engagement in ADL/IADL/functional mobility tasks with nursing and restorative therapy staff to promote the highest level of independence prior to discharge  OT is signing off, please reconsult if needed  Goals   Patient Goals To go home and regain independence   Recommendation   OT Discharge Recommendation Return to previous environment with social support   OT - OK to Discharge Yes  (When medically appropriate)   AM-PAC Daily Activity Inpatient   Lower Body Dressing 3   Bathing 4   Toileting 3   Upper Body Dressing 4   Grooming 4   Eating 4   Daily Activity Raw Score 22   Daily Activity Standardized Score (Calc for Raw Score >=11) 47  1   AM-PAC Applied Cognition Inpatient   Following a Speech/Presentation 4   Understanding Ordinary Conversation 4   Taking Medications 4   Remembering Where Things Are Placed or Put Away 4   Remembering List of 4-5 Errands 4   Taking Care of Complicated Tasks 4   Applied Cognition Raw Score 24   Applied Cognition Standardized Score 62 21   Barthel Index   Feeding 10   Bathing 5   Grooming Score 5   Dressing Score 10   Bladder Score 10   Bowels Score 10   Toilet Use Score 10   Transfers (Bed/Chair) Score 10   Mobility (Level Surface) Score 10   Stairs Score 0   Barthel Index Score 80     Edda Barry OTS

## 2021-02-22 NOTE — CONSULTS
Consult- Shari Diaz 1945, 67 y o  female MRN: [id number]    Unit/Bed#: ICU 12 Encounter: [id number]    Primary Care Provider: Angel Baron MD   Date and time admitted to hospital: 2/21/2021  5:36 PM    Inpatient consult to Neurosurgery  Consult performed by: Milan Alegre PA-C  Consult ordered by: Fior Kim,         ICH (intracerebral hemorrhage) [facility])  Assessment & Plan  · Presented with multiple days of visual changes and 1-2 day history of headaches  · Cataract surgery in December  Eye doctor encouraged her to present for evaluation  Intracerebral Hemorrhage (ICH) Score:    [place] Coma Sore 13-15 equals +0   Age greater than or equal to [de-identified] No   ICH Volume greater than or equal to 30 ml No   Intraventricular Hemorrhage Yes (1 Point)   Infratentorial Origin of Hemorrhage No   Total: 1     Imaging:   · CT head 02/21/2021:  Right occipital lobe hemorrhage with IVH in the occipital horn  Subdural hemorrhage along the right tentorium  Hypodensity in the left temporal lobe given no history of trauma possibly represent hemorrhagic metastasis or stroke  · MRI brain 02/21/2021:  Unchanged 2 4 x 1 4 x 1 6 cm right occipital hemorrhage with subdural and intraventricular extension  T1, T2 and diffusion hyperintensity 15 mm left temporal lesion unchanged from recent CT 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     Plan:   · Ongoing frequent neurologic checks  · Recommend stat CT head for decline GCS greater than 2 points in 1 hour  · No further imaging recommended from neurosurgical standpoint  · Ongoing medical management per primary team   · Recommend systolic blood pressure less than 160  · Would also recommend Neurology consultation for intraparenchymal hemorrhagic stroke  · Can consider cardiac echo and CT chest abdomen pelvis to rule out metastatic disease although intracranial lesion more suspicious for cystic formation  · DVT ppx: SCD's   Cleared for pharm dvt ppx from neurosurgical stanpdoint  · No anticipated neurosurgical needs  · Will begin to follow from the periphery at this time  Call with questions or concerns  Will schedule outpatient follow up as needed  * Subdural hemorrhage (HCC)  Assessment & Plan  · IPH with SDH extension  · See full plan above  IVH (intraventricular hemorrhage) (HCC)  Assessment & Plan  · Small amount of occipital IVH secondary to occipital IPH  · See full plan above  Epidermoid cyst of brain  Assessment & Plan  · Incidental finding during ER evaluation  · MRI brain 02/21/2021:  T1, T2 and diffusion hyperintense 15 mm left temporal lesion unchanged from prior CT 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    · Will evaluate further in the outpatient setting  · Plan for repeat MRI brain in outpatient setting for ongoing evaluation given proximity to MCA branches in the anterior medial L temporal lobe  Hypertension  Assessment & Plan  · POA  · On amlodipine at home  History of Present Illness   HPI: Alejandra Milan is a 67 y o  female with PMH including HTN, glaucoma, bilateral cataract surgery who presents with complaint of visual changes for 3-5 days prior to presentation and a headache for 1-2 days  Patient has a history of some decreased peripheral vision that she relates is longstanding  She does have a history of bilateral cataract surgery and glaucoma right eye having undergone multiple optic procedures in the past    She states she prolonged her presentation because she thought that was strange to have the symptoms but was not concerning at for her  She called her optometrist to instructor to present to the ER for evaluation  On CT head she was found to have a occipital IPH with some small subdural and minimal IVH extension  At this time patient has minimal complaints or concerns    She subjectively still sees moving peripheral visual disturbances in her left eye  And only mild headache relieved with medication  She denies any confusion, significant visual deficits, facial weakness, dizziness, lightheadedness, changes speech, weakness, sensation changes, chest pain, shortness of breath, abdominal pain  Review of Systems   Constitutional: Negative for activity change, appetite change, fatigue and fever  HENT: Negative for hearing loss, trouble swallowing and voice change  Eyes: Positive for visual disturbance  Respiratory: Negative for cough, chest tightness and shortness of breath  Cardiovascular: Negative for chest pain and leg swelling  Gastrointestinal: Negative for abdominal pain, constipation, diarrhea, nausea and vomiting  Genitourinary: Negative for difficulty urinating  Musculoskeletal: Negative for back pain, gait problem, neck pain and neck stiffness  Skin: Negative for rash and wound  Neurological: Positive for headaches  Negative for dizziness, tremors, weakness, light-headedness and numbness  Psychiatric/Behavioral: Negative for agitation, behavioral problems and confusion         Historical Information   Past Medical History:   Diagnosis Date    Glaucoma     High blood pressure 11/30/2018    Panic attacks 1998    Papanicolaou smear 03/17/2016    NEG    Post-menopausal      Past Surgical History:   Procedure Laterality Date    COLONOSCOPY  2010    MAMMO (HISTORICAL) Bilateral 01/21/2019    No evidence of malignancy    TONSILLECTOMY      VAGINAL DELIVERY      X4     Social History     Substance and Sexual Activity   Alcohol Use Yes    Comment: social     Social History     Substance and Sexual Activity   Drug Use No     Social History     Tobacco Use   Smoking Status Never Smoker   Smokeless Tobacco Never Used     Family History   Problem Relation Age of Onset    Heart disease Sister        Meds/Allergies   all current active meds have been reviewed, current meds:   Current Facility-Administered Medications Medication Dose Route Frequency    acetaminophen (TYLENOL) tablet 650 mg  650 mg Oral Q6H PRN    amLODIPine (NORVASC) tablet 5 mg  5 mg Oral HS    Labetalol HCl (NORMODYNE) injection 10 mg  10 mg Intravenous Q4H PRN    pravastatin (PRAVACHOL) tablet 40 mg  40 mg Oral Daily With Dinner    and PTA meds:   Prior to Admission Medications   Prescriptions Last Dose Informant Patient Reported? Taking? amLODIPine (NORVASC) 5 mg tablet 2/20/2021 at 2100 Self Yes Yes   Sig: Take 5 mg by mouth daily   simvastatin (ZOCOR) 20 mg tablet 2/20/2021 at 2100 Self Yes Yes   Sig: Take 20 mg by mouth daily at bedtime      Facility-Administered Medications: None     Allergies   Allergen Reactions    Codeine     Sulfa Antibiotics        Objective   I/O       02/20 0701 - 02/21 0700 02/21 0701 - 02/22 0700 02/22 0701 - 02/23 0700    P  O   1900 240    I V  (mL/kg)  422 4 (7 5)     Total Intake(mL/kg)  2322 4 (41 3) 240 (4 3)    Urine (mL/kg/hr)  925 350 (1 2)    Total Output  925 350    Net  +1397 4 -110                 Physical Exam  Constitutional:       Appearance: She is well-developed  HENT:      Head: Normocephalic and atraumatic  Eyes:      General: No scleral icterus  Extraocular Movements: EOM normal       Conjunctiva/sclera: Conjunctivae normal       Pupils: Pupils are equal, round, and reactive to light  Neck:      Musculoskeletal: Normal range of motion and neck supple  Vascular: No JVD  Trachea: No tracheal deviation  Cardiovascular:      Rate and Rhythm: Normal rate  Pulmonary:      Effort: Pulmonary effort is normal  No respiratory distress  Abdominal:      General: There is no distension  Palpations: Abdomen is soft  Musculoskeletal: Normal range of motion  General: No deformity  Skin:     General: Skin is warm and dry  Neurological:      Mental Status: She is alert and oriented to person, place, and time  Cranial Nerves: No cranial nerve deficit        Sensory: No sensory deficit  Motor: No weakness  Deep Tendon Reflexes: Reflexes are normal and symmetric  Reflex Scores:       Bicep reflexes are 2+ on the right side and 2+ on the left side  Brachioradialis reflexes are 2+ on the right side and 2+ on the left side  Patellar reflexes are 2+ on the right side and 2+ on the left side  Psychiatric:         Speech: Speech normal          Behavior: Behavior normal          Thought Content: Thought content normal        Neurologic Exam     Mental Status   Oriented to person, place, and time  Attention: normal    Speech: speech is normal   Level of consciousness: alert  Knowledge: good  Normal comprehension  Cranial Nerves     CN II   Visual fields full to confrontation  Right visual field deficit: no apparent CN field deficit  Left visual field deficit: no apparent CN field deficit  CN III, IV, VI   Pupils are equal, round, and reactive to light  Extraocular motions are normal    Upgaze: normal  Downgaze: normal    CN V   Facial sensation intact  CN VII   Facial expression full, symmetric       CN VIII   CN VIII normal    Hearing: intact    CN IX, X   CN IX normal      CN XI   CN XI normal      CN XII   CN XII normal    Tongue deviation: none    Motor Exam   Muscle bulk: normal  Right arm tone: normal  Left arm tone: normal  Right leg tone: normal  Left leg tone: normal    Strength   Right deltoid: 5/5  Left deltoid: 5/5  Right biceps: 5/5  Left biceps: 5/5  Right triceps: 5/5  Left triceps: 5/5  Right wrist flexion: 5/5  Left wrist flexion: 5/5  Right wrist extension: 5/5  Left wrist extension: 5/5  Right iliopsoas: 5/5  Left iliopsoas: 5/5  Right quadriceps: 5/5  Left quadriceps: 5/5  Right hamstrin/5  Left hamstrin/5  Right anterior tibial: 5/5  Left anterior tibial: 5/5  Right gastroc: 5/5  Left gastroc: 5/5    Sensory Exam   Light touch normal    DST intact      Gait, Coordination, and Reflexes     Tremor   Resting tremor: absent  Action tremor: absent    Reflexes   Right brachioradialis: 2+  Left brachioradialis: 2+  Right biceps: 2+  Left biceps: 2+  Right patellar: 2+  Left patellar: 2+  Right Purvis: absent  Left Purvis: absent  Right ankle clonus: absent  Left ankle clonus: absent      Vitals:Blood pressure 142/59, pulse 68, temperature 97 6 °F (36 4 °C), temperature source Oral, resp  rate 22, height 5' 1" (1 549 m), weight 56 3 kg (124 lb 1 9 oz), SpO2 97 %, not currently breastfeeding  ,Body mass index is 23 45 kg/m²  Lab Results:   Results from last 7 days   Lab Units 02/22/21  0529 02/21/21  1137   WBC Thousand/uL 7 66 8 30   HEMOGLOBIN g/dL 12 3 14 0   HEMATOCRIT % 38 3 44 1   PLATELETS Thousands/uL 218 279   NEUTROS PCT % 61 67   MONOS PCT % 15* 10     Results from last 7 days   Lab Units 02/22/21  0529 02/21/21  1137   POTASSIUM mmol/L 3 5 4 0   CHLORIDE mmol/L 106 102   CO2 mmol/L 24 24   BUN mg/dL 12 18   CREATININE mg/dL 0 68 1 10   CALCIUM mg/dL 8 7 9 7   ALK PHOS U/L  --  66   ALT U/L  --  25   AST U/L  --  20     Results from last 7 days   Lab Units 02/22/21  0529 02/21/21  1137   MAGNESIUM mg/dL 2 2 2 4     Results from last 7 days   Lab Units 02/22/21  0529   PHOSPHORUS mg/dL 2 9     Results from last 7 days   Lab Units 02/22/21  0529 02/21/21  1333   INR  1 03 1 00     No results found for: TROPONINT  ABG:No results found for: PHART, WML5GUF, PO2ART, YQL6ACI, V8ASDIKT, BEART, SOURCE    Imaging Studies: I have personally reviewed pertinent reports  and I have personally reviewed pertinent films in PACS    Xr Chest 1 View Portable    Result Date: 2/22/2021  Impression: No acute cardiopulmonary disease  Workstation performed: [id number]     Ct Head Without Contrast    Result Date: 2/21/2021  Impression: Right occipital lobe hemorrhage and intraventricular hemorrhage in the occipital horn  Subdural blood along the right tentorium  Additional hypodensity in the left temporal lobe    Given no history of trauma, findings likely represent either hemorrhagic  metastases or stroke  Recommend MRI with contrast   I personally discussed this study with Monika Hernandez on 2/21/2021 at 1:19 PM   Workstation performed: [id number]     Mri Brain W Wo Contrast    Result Date: 2/21/2021  Impression: Unchanged 2 4 x 1 4 x 1 6 cm right occipital hemorrhage  No apparent underlying mass or enhancement  T1, T2 and diffusion hyperintense 15 mm left temporal lesion is unchanged from the recent CT and 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

## 2021-02-22 NOTE — ASSESSMENT & PLAN NOTE
· Presented with multiple days of visual changes and 1-2 day history of headaches  · Cataract surgery in December  Eye doctor encouraged her to present for evaluation  Intracerebral Hemorrhage (ICH) Score:    [place] Coma Sore 13-15 equals +0   Age greater than or equal to [de-identified] No   ICH Volume greater than or equal to 30 ml No   Intraventricular Hemorrhage Yes (1 Point)   Infratentorial Origin of Hemorrhage No   Total: 1     Imaging:   · CT head 02/21/2021:  Right occipital lobe hemorrhage with IVH in the occipital horn  Subdural hemorrhage along the right tentorium  Hypodensity in the left temporal lobe given no history of trauma possibly represent hemorrhagic metastasis or stroke  · MRI brain 02/21/2021:  Unchanged 2 4 x 1 4 x 1 6 cm right occipital hemorrhage with subdural and intraventricular extension  T1, T2 and diffusion hyperintensity 15 mm left temporal lesion unchanged from recent CT 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     Plan:   · Ongoing frequent neurologic checks  · Recommend stat CT head for decline GCS greater than 2 points in 1 hour  · No further imaging recommended from neurosurgical standpoint  · Ongoing medical management per primary team   · Recommend systolic blood pressure less than 160  · Would also recommend Neurology consultation for intraparenchymal hemorrhagic stroke  · Can consider cardiac echo and CT chest abdomen pelvis to rule out metastatic disease although intracranial lesion more suspicious for cystic formation  · DVT ppx: SCD's  Cleared for pharm dvt ppx from neurosurgical stanpdoint  · No anticipated neurosurgical needs  · Will begin to follow from the periphery at this time  Call with questions or concerns  Will schedule outpatient follow up as needed

## 2021-02-22 NOTE — PLAN OF CARE
Problem: Prexisting or High Potential for Compromised Skin Integrity  Goal: Skin integrity is maintained or improved  Description: INTERVENTIONS:  - Identify patients at risk for skin breakdown  - Assess and monitor skin integrity  - Assess and monitor nutrition and hydration status  - Monitor labs   - Assess for incontinence   - Turn and reposition patient  - Assist with mobility/ambulation  - Relieve pressure over bony prominences  - Avoid friction and shearing  - Provide appropriate hygiene as needed including keeping skin clean and dry  - Evaluate need for skin moisturizer/barrier cream  - Collaborate with interdisciplinary team   - Patient/family teaching  - Consider wound care consult   Outcome: Progressing     Problem: Potential for Falls  Goal: Patient will remain free of falls  Description: INTERVENTIONS:  - Assess patient frequently for physical needs  -  Identify cognitive and physical deficits and behaviors that affect risk of falls  -  [place] fall precautions as indicated by assessment   - Educate patient/family on patient safety including physical limitations  - Instruct patient to call for assistance with activity based on assessment  - Modify environment to reduce risk of injury  - Consider OT/PT consult to assist with strengthening/mobility  Outcome: Progressing     Problem: Neurological Deficit  Goal: Neurological status is stable or improving  Description: Interventions:  - Monitor and assess patient's level of consciousness, motor function, sensory function, and level of assistance needed for ADLs  - Monitor and report changes from baseline  Collaborate with interdisciplinary team to initiate plan and implement interventions as ordered  - Provide and maintain a safe environment  - Consider seizure precautions  - Consider fall precautions  - Consider aspiration precautions  - Consider bleeding precautions  Outcome: Progressing     Problem:  Activity Intolerance/Impaired Mobility  Goal: Mobility/activity is maintained at optimum level for patient  Description: Interventions:  - Assess and monitor patient  barriers to mobility and need for assistive/adaptive devices  - Assess patient's emotional response to limitations  - Collaborate with interdisciplinary team and initiate plans and interventions as ordered  - Encourage independent activity per ability   - Maintain proper body alignment  - Perform active/passive rom as tolerated/ordered  - Plan activities to conserve energy   - Turn patient as appropriate  Outcome: Progressing     Problem: Communication Impairment  Goal: Ability to express needs and understand communication  Description: Assess patient's communication skills and ability to understand information  Patient will demonstrate use of effective communication techniques, alternative methods of communication and understanding even if not able to speak  - Encourage communication and provide alternate methods of communication as needed  - Collaborate with case management/ for discharge needs  - Include patient/family/caregiver in decisions related to communication  Outcome: Progressing     Problem: Potential for Aspiration  Goal: Non-ventilated patient's risk of aspiration is minimized  Description: Assess and monitor vital signs, respiratory status, and labs (WBC)  Monitor for signs of aspiration (tachypnea, cough, rales, wheezing, cyanosis, fever)  - Assess and monitor patient's ability to swallow  - Place patient up in chair to eat if possible  - HOB up at 90 degrees to eat if unable to get patient up into chair   - Supervise patient during oral intake  - Instruct patient/ family to take small bites  - Instruct patient/ family to take small single sips when taking liquids    - Follow patient-specific strategies generated by speech pathologist   Outcome: Progressing  Goal: Ventilated patient's risk of aspiration is minimized  Description: Assess and monitor vital signs, respiratory status, airway cuff pressure, and labs (WBC)  Monitor for signs of aspiration (tachypnea, cough, rales, wheezing, cyanosis, fever)  - Elevate head of bed 30 degrees if patient has tube feeding   - Monitor tube feeding  Outcome: Progressing     Problem: Nutrition  Goal: Nutrition/Hydration status is improving  Description: Monitor and assess patient's nutrition/hydration status for malnutrition (ex- brittle hair, bruises, dry skin, pale skin and conjunctiva, muscle wasting, smooth red tongue, and disorientation)  Collaborate with interdisciplinary team and initiate plan and interventions as ordered  Monitor patient's weight and dietary intake as ordered or per policy  Utilize nutrition screening tool and intervene per policy  Determine patient's food preferences and provide high-protein, high-caloric foods as appropriate  - Assist patient with eating   - Allow adequate time for meals   - Encourage patient to take dietary supplement as ordered  - Collaborate with clinical nutritionist   - Include patient/family/caregiver in decisions related to nutrition    Outcome: Progressing     Problem: PAIN - ADULT  Goal: Verbalizes/displays adequate comfort level or baseline comfort level  Description: Interventions:  - Encourage patient to monitor pain and request assistance  - Assess pain using appropriate pain scale  - Administer analgesics based on type and severity of pain and evaluate response  - Implement non-pharmacological measures as appropriate and evaluate response  - Consider cultural and social influences on pain and pain management  - Notify physician/advanced practitioner if interventions unsuccessful or patient reports new pain  Outcome: Progressing     Problem: INFECTION - ADULT  Goal: Absence or prevention of progression during hospitalization  Description: INTERVENTIONS:  - Assess and monitor for signs and symptoms of infection  - Monitor lab/diagnostic results  - Monitor all insertion sites, i e  indwelling lines, tubes, and drains  - Monitor endotracheal if appropriate and nasal secretions for changes in amount and color  - [place] appropriate cooling/warming therapies per order  - Administer medications as ordered  - Instruct and encourage patient and family to use good hand hygiene technique  - Identify and instruct in appropriate isolation precautions for identified infection/condition  Outcome: Progressing     Problem: SAFETY ADULT  Goal: Patient will remain free of falls  Description: INTERVENTIONS:  - Assess patient frequently for physical needs  -  Identify cognitive and physical deficits and behaviors that affect risk of falls    -  [place] fall precautions as indicated by assessment   - Educate patient/family on patient safety including physical limitations  - Instruct patient to call for assistance with activity based on assessment  - Modify environment to reduce risk of injury  - Consider OT/PT consult to assist with strengthening/mobility  Outcome: Progressing  Goal: Maintain or return to baseline ADL function  Description: INTERVENTIONS:  -  Assess patient's ability to carry out ADLs; assess patient's baseline for ADL function and identify physical deficits which impact ability to perform ADLs (bathing, care of mouth/teeth, toileting, grooming, dressing, etc )  - Assess/evaluate cause of self-care deficits   - Assess range of motion  - Assess patient's mobility; develop plan if impaired  - Assess patient's need for assistive devices and provide as appropriate  - Encourage maximum independence but intervene and supervise when necessary  - Involve family in performance of ADLs  - Assess for home care needs following discharge   - Consider OT consult to assist with ADL evaluation and planning for discharge  - Provide patient education as appropriate  Outcome: Progressing  Goal: Maintain or return mobility status to optimal level  Description: INTERVENTIONS:  - Assess patient's baseline mobility status (ambulation, transfers, stairs, etc )    - Identify cognitive and physical deficits and behaviors that affect mobility  - Identify mobility aids required to assist with transfers and/or ambulation (gait belt, sit-to-stand, lift, walker, cane, etc )  - [place] fall precautions as indicated by assessment  - Record patient progress and toleration of activity level on Mobility SBAR; progress patient to next Phase/Stage  - Instruct patient to call for assistance with activity based on assessment  - Consider rehabilitation consult to assist with strengthening/weightbearing, etc   Outcome: Progressing     Problem: DISCHARGE PLANNING  Goal: Discharge to home or other facility with appropriate resources  Description: INTERVENTIONS:  - Identify barriers to discharge w/patient and caregiver  - Arrange for needed discharge resources and transportation as appropriate  - Identify discharge learning needs (meds, wound care, etc )  - Arrange for interpretive services to assist at discharge as needed  - Refer to Case Management Department for coordinating discharge planning if the patient needs post-hospital services based on physician/advanced practitioner order or complex needs related to functional status, cognitive ability, or social support system  Outcome: Progressing     Problem: Knowledge Deficit  Goal: Patient/family/caregiver demonstrates understanding of disease process, treatment plan, medications, and discharge instructions  Description: Complete learning assessment and assess knowledge base    Interventions:  - Provide teaching at level of understanding  - Provide teaching via preferred learning methods  Outcome: Progressing     Problem: NEUROSENSORY - ADULT  Goal: Achieves stable or improved neurological status  Description: INTERVENTIONS  - Monitor and report changes in neurological status  - Monitor vital signs such as temperature, blood pressure, glucose, and any other labs ordered   - Initiate measures to prevent increased intracranial pressure  - Monitor for seizure activity and implement precautions if appropriate      Outcome: Progressing  Goal: Remains free of injury related to seizures activity  Description: INTERVENTIONS  - Maintain airway, patient safety  and administer oxygen as ordered  - Monitor patient for seizure activity, document and report duration and description of seizure to physician/advanced practitioner  - If seizure occurs,  ensure patient safety during seizure  - Reorient patient post seizure  - Seizure pads on all 4 side rails  - Instruct patient/family to notify RN of any seizure activity including if an aura is experienced  - Instruct patient/family to call for assistance with activity based on nursing assessment  - Administer anti-seizure medications if ordered    Outcome: Progressing  Goal: Achieves maximal functionality and self care  Description: INTERVENTIONS  - Monitor swallowing and airway patency with patient fatigue and changes in neurological status  - Encourage and assist patient to increase activity and self care  - Encourage visually impaired, hearing impaired and aphasic patients to use assistive/communication devices  Outcome: Progressing     Problem: Nutrition/Hydration-ADULT  Goal: Nutrient/Hydration intake appropriate for improving, restoring or maintaining nutritional needs  Description: Monitor and assess patient's nutrition/hydration status for malnutrition  Collaborate with interdisciplinary team and initiate plan and interventions as ordered  Monitor patient's weight and dietary intake as ordered or per policy  Utilize nutrition screening tool and intervene as necessary  Determine patient's food preferences and provide high-protein, high-caloric foods as appropriate       INTERVENTIONS:  - Monitor oral intake, urinary output, labs, and treatment plans  - Assess nutrition and hydration status and recommend course of action  - Evaluate amount of meals eaten  - Assist patient with eating if necessary   - Allow adequate time for meals  - Recommend/ encourage appropriate diets, oral nutritional supplements, and vitamin/mineral supplements  - Order, calculate, and assess calorie counts as needed  - Recommend, monitor, and adjust tube feedings and TPN/PPN based on assessed needs  - Assess need for intravenous fluids  - Provide specific nutrition/hydration education as appropriate  - Include patient/family/caregiver in decisions related to nutrition  Outcome: Progressing

## 2021-02-22 NOTE — PLAN OF CARE
Problem: PHYSICAL THERAPY ADULT  Goal: Performs mobility at highest level of function for planned discharge setting  See evaluation for individualized goals  Description: Treatment/Interventions: Functional transfer training, Gait training, Spoke to nursing, Spoke to case management, OT(dynamic functional balance and stairs)          See flowsheet documentation for full assessment, interventions and recommendations  Note: Prognosis: Good  Problem List: Impaired balance  Assessment: Pt is a 67 y o female who presents to hospitals with vision changes and HA  Pt was transferred from [address] [place] [address] for further testing  CT and MRI on 2/21/2021 revealed R occipital hemorrhage/intraparenchymal hemorrhage  Pt has a PMH of glaucoma and HTN  Pt with active PT orders to assess functional mobility to determine D/C needs  Pt lives with her  in a split level house with 6 KENNY and 6 steps up and down from the main level  PTA, pt was independent with ADLs/IADLs/driving and reports no previous use of DME  Pt required supervision with sit to stand transfer for safety  Pt was given a RW to trial with ambulation but felt like she "didn't need it"  Pt required CG with and without the RW for ambulation secondary to a few lateral sways  Lateral sways were more noticeable without the RW  Pt returned to bed after ambulation with all lines and tubes organized and call bell within reach  The patient's AM-PAC Basic Mobility Inpatient Short Form Raw Score is 23, Standardized Score is 50  88  A standardized score greater than 42 9 suggests the patient may benefit from discharge to home  Please also refer to the recommendation of the Physical Therapist for safe discharge planning  Other objective measures can be found above  Pt is considered to be of moderate complexity secondary to increased fall risk and current diagnosis   At this time, pt would benefit from skilled therapy 3-5x/wk to address gait deviations through standing dynamic balance training until medially cleared for D/C  Pt should have stair mobility assessed to ensure safe return to home enviornment prior to D/C  Barriers to Discharge: None     PT Discharge Recommendation: Home with skilled therapy     PT - [place] to Discharge: No    See flowsheet documentation for full assessment

## 2021-02-22 NOTE — PLAN OF CARE
Problem: Prexisting or High Potential for Compromised Skin Integrity  Goal: Skin integrity is maintained or improved  Description: INTERVENTIONS:  - Identify patients at risk for skin breakdown  - Assess and monitor skin integrity  - Assess and monitor nutrition and hydration status  - Monitor labs   - Assess for incontinence   - Turn and reposition patient  - Assist with mobility/ambulation  - Relieve pressure over bony prominences  - Avoid friction and shearing  - Provide appropriate hygiene as needed including keeping skin clean and dry  - Evaluate need for skin moisturizer/barrier cream  - Collaborate with interdisciplinary team   - Patient/family teaching  - Consider wound care consult   Outcome: Progressing     Problem: Potential for Falls  Goal: Patient will remain free of falls  Description: INTERVENTIONS:  - Assess patient frequently for physical needs  -  Identify cognitive and physical deficits and behaviors that affect risk of falls  -  [place] fall precautions as indicated by assessment   - Educate patient/family on patient safety including physical limitations  - Instruct patient to call for assistance with activity based on assessment  - Modify environment to reduce risk of injury  - Consider OT/PT consult to assist with strengthening/mobility  Outcome: Progressing     Problem: Neurological Deficit  Goal: Neurological status is stable or improving  Description: Interventions:  - Monitor and assess patient's level of consciousness, motor function, sensory function, and level of assistance needed for ADLs  - Monitor and report changes from baseline  Collaborate with interdisciplinary team to initiate plan and implement interventions as ordered  - Provide and maintain a safe environment  - Consider seizure precautions  - Consider fall precautions  - Consider aspiration precautions  - Consider bleeding precautions  Outcome: Progressing     Problem:  Activity Intolerance/Impaired Mobility  Goal: Mobility/activity is maintained at optimum level for patient  Description: Interventions:  - Assess and monitor patient  barriers to mobility and need for assistive/adaptive devices  - Assess patient's emotional response to limitations  - Collaborate with interdisciplinary team and initiate plans and interventions as ordered  - Encourage independent activity per ability   - Maintain proper body alignment  - Perform active/passive rom as tolerated/ordered  - Plan activities to conserve energy   - Turn patient as appropriate  Outcome: Progressing     Problem: Communication Impairment  Goal: Ability to express needs and understand communication  Description: Assess patient's communication skills and ability to understand information  Patient will demonstrate use of effective communication techniques, alternative methods of communication and understanding even if not able to speak  - Encourage communication and provide alternate methods of communication as needed  - Collaborate with case management/ for discharge needs  - Include patient/family/caregiver in decisions related to communication  Outcome: Progressing     Problem: Potential for Aspiration  Goal: Non-ventilated patient's risk of aspiration is minimized  Description: Assess and monitor vital signs, respiratory status, and labs (WBC)  Monitor for signs of aspiration (tachypnea, cough, rales, wheezing, cyanosis, fever)  - Assess and monitor patient's ability to swallow  - Place patient up in chair to eat if possible  - HOB up at 90 degrees to eat if unable to get patient up into chair   - Supervise patient during oral intake  - Instruct patient/ family to take small bites  - Instruct patient/ family to take small single sips when taking liquids    - Follow patient-specific strategies generated by speech pathologist   Outcome: Progressing  Goal: Ventilated patient's risk of aspiration is minimized  Description: Assess and monitor vital signs, respiratory status, airway cuff pressure, and labs (WBC)  Monitor for signs of aspiration (tachypnea, cough, rales, wheezing, cyanosis, fever)  - Elevate head of bed 30 degrees if patient has tube feeding   - Monitor tube feeding  Outcome: Progressing     Problem: Nutrition  Goal: Nutrition/Hydration status is improving  Description: Monitor and assess patient's nutrition/hydration status for malnutrition (ex- brittle hair, bruises, dry skin, pale skin and conjunctiva, muscle wasting, smooth red tongue, and disorientation)  Collaborate with interdisciplinary team and initiate plan and interventions as ordered  Monitor patient's weight and dietary intake as ordered or per policy  Utilize nutrition screening tool and intervene per policy  Determine patient's food preferences and provide high-protein, high-caloric foods as appropriate  - Assist patient with eating   - Allow adequate time for meals   - Encourage patient to take dietary supplement as ordered  - Collaborate with clinical nutritionist   - Include patient/family/caregiver in decisions related to nutrition    Outcome: Progressing     Problem: PAIN - ADULT  Goal: Verbalizes/displays adequate comfort level or baseline comfort level  Description: Interventions:  - Encourage patient to monitor pain and request assistance  - Assess pain using appropriate pain scale  - Administer analgesics based on type and severity of pain and evaluate response  - Implement non-pharmacological measures as appropriate and evaluate response  - Consider cultural and social influences on pain and pain management  - Notify physician/advanced practitioner if interventions unsuccessful or patient reports new pain  Outcome: Progressing     Problem: INFECTION - ADULT  Goal: Absence or prevention of progression during hospitalization  Description: INTERVENTIONS:  - Assess and monitor for signs and symptoms of infection  - Monitor lab/diagnostic results  - Monitor all insertion sites, i e  indwelling lines, tubes, and drains  - Monitor endotracheal if appropriate and nasal secretions for changes in amount and color  - [place] appropriate cooling/warming therapies per order  - Administer medications as ordered  - Instruct and encourage patient and family to use good hand hygiene technique  - Identify and instruct in appropriate isolation precautions for identified infection/condition  Outcome: Progressing     Problem: SAFETY ADULT  Goal: Patient will remain free of falls  Description: INTERVENTIONS:  - Assess patient frequently for physical needs  -  Identify cognitive and physical deficits and behaviors that affect risk of falls    -  [place] fall precautions as indicated by assessment   - Educate patient/family on patient safety including physical limitations  - Instruct patient to call for assistance with activity based on assessment  - Modify environment to reduce risk of injury  - Consider OT/PT consult to assist with strengthening/mobility  Outcome: Progressing  Goal: Maintain or return to baseline ADL function  Description: INTERVENTIONS:  -  Assess patient's ability to carry out ADLs; assess patient's baseline for ADL function and identify physical deficits which impact ability to perform ADLs (bathing, care of mouth/teeth, toileting, grooming, dressing, etc )  - Assess/evaluate cause of self-care deficits   - Assess range of motion  - Assess patient's mobility; develop plan if impaired  - Assess patient's need for assistive devices and provide as appropriate  - Encourage maximum independence but intervene and supervise when necessary  - Involve family in performance of ADLs  - Assess for home care needs following discharge   - Consider OT consult to assist with ADL evaluation and planning for discharge  - Provide patient education as appropriate  Outcome: Progressing  Goal: Maintain or return mobility status to optimal level  Description: INTERVENTIONS:  - Assess patient's baseline mobility status (ambulation, transfers, stairs, etc )    - Identify cognitive and physical deficits and behaviors that affect mobility  - Identify mobility aids required to assist with transfers and/or ambulation (gait belt, sit-to-stand, lift, walker, cane, etc )  - [place] fall precautions as indicated by assessment  - Record patient progress and toleration of activity level on Mobility SBAR; progress patient to next Phase/Stage  - Instruct patient to call for assistance with activity based on assessment  - Consider rehabilitation consult to assist with strengthening/weightbearing, etc   Outcome: Progressing     Problem: DISCHARGE PLANNING  Goal: Discharge to home or other facility with appropriate resources  Description: INTERVENTIONS:  - Identify barriers to discharge w/patient and caregiver  - Arrange for needed discharge resources and transportation as appropriate  - Identify discharge learning needs (meds, wound care, etc )  - Arrange for interpretive services to assist at discharge as needed  - Refer to Case Management Department for coordinating discharge planning if the patient needs post-hospital services based on physician/advanced practitioner order or complex needs related to functional status, cognitive ability, or social support system  Outcome: Progressing     Problem: Knowledge Deficit  Goal: Patient/family/caregiver demonstrates understanding of disease process, treatment plan, medications, and discharge instructions  Description: Complete learning assessment and assess knowledge base    Interventions:  - Provide teaching at level of understanding  - Provide teaching via preferred learning methods  Outcome: Progressing     Problem: NEUROSENSORY - ADULT  Goal: Achieves stable or improved neurological status  Description: INTERVENTIONS  - Monitor and report changes in neurological status  - Monitor vital signs such as temperature, blood pressure, glucose, and any other labs ordered   - Initiate measures to prevent increased intracranial pressure  - Monitor for seizure activity and implement precautions if appropriate      Outcome: Progressing  Goal: Remains free of injury related to seizures activity  Description: INTERVENTIONS  - Maintain airway, patient safety  and administer oxygen as ordered  - Monitor patient for seizure activity, document and report duration and description of seizure to physician/advanced practitioner  - If seizure occurs,  ensure patient safety during seizure  - Reorient patient post seizure  - Seizure pads on all 4 side rails  - Instruct patient/family to notify RN of any seizure activity including if an aura is experienced  - Instruct patient/family to call for assistance with activity based on nursing assessment  - Administer anti-seizure medications if ordered    Outcome: Progressing  Goal: Achieves maximal functionality and self care  Description: INTERVENTIONS  - Monitor swallowing and airway patency with patient fatigue and changes in neurological status  - Encourage and assist patient to increase activity and self care  - Encourage visually impaired, hearing impaired and aphasic patients to use assistive/communication devices  Outcome: Progressing     Problem: Nutrition/Hydration-ADULT  Goal: Nutrient/Hydration intake appropriate for improving, restoring or maintaining nutritional needs  Description: Monitor and assess patient's nutrition/hydration status for malnutrition  Collaborate with interdisciplinary team and initiate plan and interventions as ordered  Monitor patient's weight and dietary intake as ordered or per policy  Utilize nutrition screening tool and intervene as necessary  Determine patient's food preferences and provide high-protein, high-caloric foods as appropriate       INTERVENTIONS:  - Monitor oral intake, urinary output, labs, and treatment plans  - Assess nutrition and hydration status and recommend course of action  - Evaluate amount of meals eaten  - Assist patient with eating if necessary   - Allow adequate time for meals  - Recommend/ encourage appropriate diets, oral nutritional supplements, and vitamin/mineral supplements  - Order, calculate, and assess calorie counts as needed  - Recommend, monitor, and adjust tube feedings and TPN/PPN based on assessed needs  - Assess need for intravenous fluids  - Provide specific nutrition/hydration education as appropriate  - Include patient/family/caregiver in decisions related to nutrition  Outcome: Progressing

## 2021-02-22 NOTE — PROGRESS NOTES
CRITICAL CARE TRANSFER NOTE     Name: Adriano Ahn   Age & Sex: 67 y o  female   MRN: [id number]  Unit/Bed#: ICU 12   Encounter: Brittany Grullon 42 Stay Days: 1    Reason for ICU Admission: IPH, BP control   Code Status: Level 1 - Full Code  Condition: stable  Disposition: Patient requires Med/Surg with Telemetry    Accepting Physician: Dr Salomon Morrow      ASSESSMENT/PLAN     Principal Problem:    Subdural hemorrhage ([address] )  Active Problems:    Hypertension    Headache    ICH (intracerebral hemorrhage) (HCC)    IVH (intraventricular hemorrhage) (HCC)    Epidermoid cyst of brain    Subdural hemorrhage, occipital intraparenchymal hemorrhage  Presented with vision changes and headache  Patient states vision changes started approximately 3 days ago, reports vision becoming blurry  She also reports headache which she first noticed yesterday  Patient presented to [address] changes and Headaches improving  ? CT head: Right occipital lobe hemorrhage and intraventricular hemorrhage in the occipital horn   Subdural blood along the right tentorium   Additional hypodensity in the left temporal lobe  ? MRI revealed Unchanged right occipital intraparenchymal hemorrhage measuring 2 4 x 1 4 x 1 6 cm  No underlying mass or area of enhancement is not apparent  ? ICH: 0   ? Neurosurgery consulted  No intervention  ? Neurology consulted  ? SBP goal less than 140   Cardene drip - D/c overnight   ? Neurochecks  ? low threshold to [facility] and start vEEG  ? Fall precautions  ? Tylenol for headaches     Hypertension   ? SBP goal less than 140  ? Cardene drip - d/c overnight   ?  Restarted home amlodipine     Hyperlipidemia  · Continue statin      VTE Pharmacologic Prophylaxis: Reason for no pharmacologic prophylaxis IPH  VTE Mechanical Prophylaxis: sequential compression device    HOSPITAL COURSE     Adriano Ahn is a 67 y o  female with past medical history significant for hypertension and hyperlipidemia who presents with vision changes and headache  Patient states vision changes started approximately 3 days ago, reports vision becoming blurry  She also reports headache which she first noticed yesterday  Patient presented to [facility]  There CT head revealed subdural hemorrhage and right occipital IPH  MRI also revealed right occipital IPH  Patient was initially started on cardene drip with SBP goals <140  It was discontinued overnight  Neurosurgery evaluated and recommended no intervention at this time  Neurology consulted  Patient is currently stable to be transfer out of the ICU into the medical floors  OBJECTIVE     Vitals:    02/22/21 0900 02/22/21 1000 02/22/21 1100 02/22/21 1200   BP: 146/62 (!) 139/48 142/59 144/64   BP Location:       Pulse: 76 70 68 64   Resp: 20 21 22 22   Temp:       TempSrc:       SpO2: 98% 98% 97% 97%   Weight:       Height:         I/O last 24 hours: In: 2562 4 [P O :2140; I V :422 4]  Out: 1275 [Urine:1275]    LABORATORY DATA     Labs: I have personally reviewed pertinent reports        Results from last 7 days   Lab Units 02/22/21  0529 02/21/21  1137   WBC Thousand/uL 7 66 8 30   HEMOGLOBIN g/dL 12 3 14 0   HEMATOCRIT % 38 3 44 1   PLATELETS Thousands/uL 218 279   NEUTROS PCT % 61 67   MONOS PCT % 15* 10      Results from last 7 days   Lab Units 02/22/21  0529 02/21/21  1137   POTASSIUM mmol/L 3 5 4 0   CHLORIDE mmol/L 106 102   CO2 mmol/L 24 24   BUN mg/dL 12 18   CREATININE mg/dL 0 68 1 10   CALCIUM mg/dL 8 7 9 7   ALK PHOS U/L  --  66   ALT U/L  --  25   AST U/L  --  20     Results from last 7 days   Lab Units 02/22/21  0529 02/21/21  1137   MAGNESIUM mg/dL 2 2 2 4     Results from last 7 days   Lab Units 02/22/21  0529   PHOSPHORUS mg/dL 2 9      Results from last 7 days   Lab Units 02/22/21  0529 02/21/21  1333   INR  1 03 1 00         Results from last 7 days   Lab Units 02/21/21  1137   TROPONIN I ng/mL <0 02     Micro:  No results found for: Aron [facility] [address] DIAGNOSTIC TESTING     Imaging: I have personally reviewed pertinent reports  Xr Chest 1 View Portable    Result Date: 2/22/2021  Impression: No acute cardiopulmonary disease  Workstation performed: [id number]     Ct Head Without Contrast    Result Date: 2/21/2021  Impression: Right occipital lobe hemorrhage and intraventricular hemorrhage in the occipital horn  Subdural blood along the right tentorium  Additional hypodensity in the left temporal lobe  Given no history of trauma, findings likely represent either hemorrhagic  metastases or stroke  Recommend MRI with contrast   I personally discussed this study with Monika Mary on 2/21/2021 at 1:19 PM   Workstation performed: [id number]     Mri Brain W Wo Contrast    Result Date: 2/21/2021  Impression: Unchanged 2 4 x 1 4 x 1 6 cm right occipital hemorrhage  No apparent underlying mass or enhancement  T1, T2 and diffusion hyperintense 15 mm left temporal lesion is unchanged from the recent CT and 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  The study was marked in EPIC for immediate notification  Workstation performed: [id number]     EKG, Pathology, and Other Studies: I have personally reviewed pertinent reports       ALLERGIES     Allergies   Allergen Reactions    Codeine     Sulfa Antibiotics        MEDICATIONS     Current Facility-Administered Medications   Medication Dose Route Frequency Provider Last Rate    acetaminophen  650 mg Oral Q6H PRN Mary Garcia DO      amLODIPine  5 mg Oral HS JORY Bhatt      Labetalol HCl  10 mg Intravenous Q4H PRN Lovely Juan MD      pravastatin  40 mg Oral Daily With Marcelle Servant, DO          acetaminophen, 650 mg, Q6H PRN  Labetalol HCl, 10 mg, Q4H PRN      ==      David Morin MD  Internal Medicine Residency, PGY-2  [address]

## 2021-02-22 NOTE — ASSESSMENT & PLAN NOTE
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      CTA head/neck demonstrated no flow consequential stenosis or LVO  2D echo with EF 70%, mildly dilated left atrium, normal sized right atrium, mild MAC  LDL 94, A1c 5 5     · ICH - right occipital lobe hemorrhage with IVH in the occipital horn, subdural hemorrhage along the right tentorium  ICH score of 1  Etiology unclear - top differentials at this point include CAA vs  ischemic stroke with hemorrhagic conversion    · MRI evidence of T1/T2 and diffusion hyperintensity 15 mm a left temporal lesion, 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    Plan:  - recommend outpatient cardiac event monitor given mildly dilated left atrium and possibility of ischemic stroke with hemorrhagic conversion     - Continue ongoing medical management, continue to monitor for infectious / metabolic derangements, as per medicine team  -  No antiplatelet or anticoagulation medication at this time  -  Recommend systolic blood pressure less than 140  -  Telemetry  -  Neurological checks notify neurology with any changes in neurological examination, CT of head with any change in neurological examination  -  Plan per Neurosurgery is repeat MRI of brain in the out patient setting for ongoing evaluation, given proximity to MCA brances in the anterior medial L temporal lobe  -  no further inpatient neurologic recommendations  Patient to follow with Neurology as outpatient

## 2021-02-22 NOTE — TELEPHONE ENCOUNTER
Spoke with Ele Rene in ICU and pt is still having ECHO and would like to push CT scan back until later    Gave her the new scan time of 1730 -- Called transport and left a message to let them know the patient has been rescheduled

## 2021-02-23 ENCOUNTER — CLINICAL SUPPORT (OUTPATIENT)
Dept: CARDIOLOGY CLINIC | Facility: CLINIC | Age: 76
End: 2021-02-23
Payer: MEDICARE

## 2021-02-23 ENCOUNTER — TELEPHONE (OUTPATIENT)
Dept: NEUROSURGERY | Facility: CLINIC | Age: 76
End: 2021-02-23

## 2021-02-23 ENCOUNTER — TELEPHONE (OUTPATIENT)
Dept: NEUROLOGY | Facility: CLINIC | Age: 76
End: 2021-02-23

## 2021-02-23 DIAGNOSIS — I51.7 CARDIOMEGALY: ICD-10-CM

## 2021-02-23 DIAGNOSIS — I61.6: Primary | ICD-10-CM

## 2021-02-23 DIAGNOSIS — I51.7 ATRIAL DILATION: ICD-10-CM

## 2021-02-23 LAB
ANION GAP SERPL CALCULATED.3IONS-SCNC: 7 MMOL/L (ref 4–13)
BASOPHILS # BLD AUTO: 0.02 THOUSANDS/ΜL (ref 0–0.1)
BASOPHILS NFR BLD AUTO: 0 % (ref 0–1)
BUN SERPL-MCNC: 21 MG/DL (ref 5–25)
CALCIUM SERPL-MCNC: 9.7 MG/DL (ref 8.3–10.1)
CHLORIDE SERPL-SCNC: 107 MMOL/L (ref 100–108)
CO2 SERPL-SCNC: 25 MMOL/L (ref 21–32)
CREAT SERPL-MCNC: 0.95 MG/DL (ref 0.6–1.3)
EOSINOPHIL # BLD AUTO: 0.21 THOUSAND/ΜL (ref 0–0.61)
EOSINOPHIL NFR BLD AUTO: 3 % (ref 0–6)
ERYTHROCYTE [DISTWIDTH] IN BLOOD BY AUTOMATED COUNT: 12.4 % (ref 11.6–15.1)
GFR SERPL CREATININE-BSD FRML MDRD: 58 ML/MIN/1.73SQ M
GLUCOSE SERPL-MCNC: 102 MG/DL (ref 65–140)
HCT VFR BLD AUTO: 38.4 % (ref 34.8–46.1)
HGB BLD-MCNC: 12.3 G/DL (ref 11.5–15.4)
IMM GRANULOCYTES # BLD AUTO: 0.03 THOUSAND/UL (ref 0–0.2)
IMM GRANULOCYTES NFR BLD AUTO: 1 % (ref 0–2)
LYMPHOCYTES # BLD AUTO: 1.95 THOUSANDS/ΜL (ref 0.6–4.47)
LYMPHOCYTES NFR BLD AUTO: 29 % (ref 14–44)
MAGNESIUM SERPL-MCNC: 2.4 MG/DL (ref 1.6–2.6)
MCH RBC QN AUTO: 28.7 PG (ref 26.8–34.3)
MCHC RBC AUTO-ENTMCNC: 32 G/DL (ref 31.4–37.4)
MCV RBC AUTO: 90 FL (ref 82–98)
MONOCYTES # BLD AUTO: 1.08 THOUSAND/ΜL (ref 0.17–1.22)
MONOCYTES NFR BLD AUTO: 16 % (ref 4–12)
NEUTROPHILS # BLD AUTO: 3.34 THOUSANDS/ΜL (ref 1.85–7.62)
NEUTS SEG NFR BLD AUTO: 51 % (ref 43–75)
NRBC BLD AUTO-RTO: 0 /100 WBCS
PLATELET # BLD AUTO: 252 THOUSANDS/UL (ref 149–390)
PMV BLD AUTO: 11 FL (ref 8.9–12.7)
POTASSIUM SERPL-SCNC: 4 MMOL/L (ref 3.5–5.3)
RBC # BLD AUTO: 4.28 MILLION/UL (ref 3.81–5.12)
SODIUM SERPL-SCNC: 139 MMOL/L (ref 136–145)
WBC # BLD AUTO: 6.63 THOUSAND/UL (ref 4.31–10.16)

## 2021-02-23 PROCEDURE — NC001 PR NO CHARGE: Performed by: PHYSICIAN ASSISTANT

## 2021-02-23 PROCEDURE — 93246 EXT ECG>7D<15D RECORDING: CPT | Performed by: PHYSICIAN ASSISTANT

## 2021-02-23 PROCEDURE — 80048 BASIC METABOLIC PNL TOTAL CA: CPT | Performed by: STUDENT IN AN ORGANIZED HEALTH CARE EDUCATION/TRAINING PROGRAM

## 2021-02-23 PROCEDURE — 99232 SBSQ HOSP IP/OBS MODERATE 35: CPT | Performed by: PHYSICIAN ASSISTANT

## 2021-02-23 PROCEDURE — 85025 COMPLETE CBC W/AUTO DIFF WBC: CPT | Performed by: STUDENT IN AN ORGANIZED HEALTH CARE EDUCATION/TRAINING PROGRAM

## 2021-02-23 PROCEDURE — 99239 HOSP IP/OBS DSCHRG MGMT >30: CPT | Performed by: PHYSICIAN ASSISTANT

## 2021-02-23 PROCEDURE — 83735 ASSAY OF MAGNESIUM: CPT | Performed by: STUDENT IN AN ORGANIZED HEALTH CARE EDUCATION/TRAINING PROGRAM

## 2021-02-23 NOTE — RESTORATIVE TECHNICIAN NOTE
Restorative Specialist Mobility Note       Activity: Ambulate in valiente, Ambulate in room, Bathroom privileges, Chair, Dangle, Stand at bedside(Educated/encouraged pt to ambulate with assistance 3-4 x's/day   Pt callbell, phone/tray within reach )     Assistive Device: None       Joni ALBERTS, Restorative Technician, [employer] [facility]

## 2021-02-23 NOTE — TELEPHONE ENCOUNTER
03/03/2021-CT SCHEDULED ON 03/03/2021 02/26/2021-CALLED PT, CONFIRMED 03/09/2021 APT AND SHE WILL SCHEDULE CT     WAITING FOR CT TO BE SCHEDULED   MEDICARE/AARP        02/23/2021-PT STILL IN HOSPITAL  03/09/2021 APT W/CT HEAD      02/22/2021-(JOLYNN)FOLLOW FROM THE PERIPHERY

## 2021-02-23 NOTE — TELEPHONE ENCOUNTER
Emperatriz Crowley  Female, 67 y o , 1945  MRN:   [id number]  Phone:   [phone] (H)  PCP:   Marleen Conti MD  Primary Cvg:   Medicare/Medicare A And B  Next Appt  With Cardiology  02/23/2021 at 3:00 PM  Hospital followup  Received: Today  Message Contents   ADAMARIS Dalal RN             Diagnosis/Reason for follow-up: R occipital hemorrhage   Pt to have Zio patch ordered by medicine (discussed with Kong Chaparro PA-C) and to have f/u MRI brain as per Neurosurgery  NO AP/AC for now          Thanks!

## 2021-02-23 NOTE — PROGRESS NOTES
Progress Note - Neurology   Tariq Gaitan 67 y o  female MRN: [id number]  Unit/Bed#: [facility] 716-01 Encounter: [id number]      Assessment/Plan     IVH (intraventricular hemorrhage) [facility])  Assessment & Plan  65-year-old female with hypertension, hyperlipidemia, glaucoma and cataracts, presents with visual disturbance described as seeing objects in her field of vision, as well as a headache  CT of the head revealed R occipital ICH and small subdural hemorrhage and minimal IVH extension  The patient was transferred to [facility] for neurosurgical evaluation  MRI brain demonstrated an unchanged right occipital hemorrhage with no apparent underlying mass or enhancement  There was evidence of T1/T2 and diffusion hyperintensity 15 mm left temporal lesion - 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      CTA head/neck demonstrated no flow consequential stenosis or LVO  2D echo with EF 70%, mildly dilated left atrium, normal sized right atrium, mild MAC  LDL 94, A1c 5 5     · ICH - right occipital lobe hemorrhage with IVH in the occipital horn, subdural hemorrhage along the right tentorium  ICH score of 1  Etiology unclear - top differentials at this point include CAA vs  ischemic stroke with hemorrhagic conversion    · MRI evidence of T1/T2 and diffusion hyperintensity 15 mm a left temporal lesion, 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    Plan:  - recommend outpatient cardiac event monitor given mildly dilated left atrium and possibility of ischemic stroke with hemorrhagic conversion     - Continue ongoing medical management, continue to monitor for infectious / metabolic derangements, as per medicine team  -  No antiplatelet or anticoagulation medication at this time  -  Recommend systolic blood pressure less than 140  -  Telemetry  -  Neurological checks notify neurology with any changes in neurological examination, CT of head with any change in neurological examination  -  Plan per Neurosurgery is repeat MRI of brain in the out patient setting for ongoing evaluation, given proximity to MCA brances in the anterior medial L temporal lobe  -  no further inpatient neurologic recommendations  Patient to follow with Neurology as outpatient  Epidermoid cyst of brain  Assessment & Plan  Please see above for details, neurosurgery following, repeat imaging as out patient    Hypertension  Assessment & Plan  Goal normotension  Nasir Panchal will need follow up in in 6 weeks with neurovascular attending or advance practitioner  Patient to have follow-up imaging as per Neurosurgery's recommendations  Subjective:   Patient states she still notices a visual disturbance, which she describes as increased brightness, but no field cut  Stated prior to presentation she was having difficulty seeing the fourth and fifth fingers on each hand, but is not having this difficulty now  She also states her blood pressure regularly ran quite high and she has been taking the same dose of amlodipine for years  ROS: 12 point review of systems performed and negative except as indicated above  Vitals: Blood pressure 125/57, pulse 74, temperature 98 3 °F (36 8 °C), resp  rate 17, height 5' 1" (1 549 m), weight 56 3 kg (124 lb 1 9 oz), SpO2 94 %, not currently breastfeeding  ,Body mass index is 23 45 kg/m²  Physical Exam:   General:  Patient is well-developed, well-nourished, and in no acute distress  HEENT:  Head normocephalic  Eyes anicteric  Cardiovascular:  With regular rhythm  Lungs:  Normal effort  Nonlabored breathing  Extremities:  With no significant edema  Skin: No rashes  Neurologic:  Mental Status:  Patient is alert, pleasantly interactive, and appropriately conversational   No obvious symbolic language difficulty or dysarthria, and the patient is fully oriented  Correctly identified the current president      Gait deferred for safety  Cranial Nerves:   II: Visual fields full to confrontation  Pupils equal, round, reactive to light with normal accomodation  Cannot visualize optic fundi  III,IV,VI: extraocular movements intact with no nystagmus  V: Sensation in the V1 through V3 distributions intact to light touch bilaterally  VII: Face is symmetric except for slightly smaller palpebral fissure on the right as compared to left  VIII: Audition intact to finger rub bilaterally  IX/X: Uvula midline  Soft palate elevation symmetric  XII: Tongue midline with no atrophy or fasciculations with appropriate movement  Coordination:  Accurate with finger-to-nose and heel-to-shin maneuvers bilaterally  Motor testing with no upper or lower extremity drift  Sensory testing grossly intact to light touch throughout  Lab, Imaging and other studies:   HgBA1C:   Results from last 7 days   Lab Units 02/22/21  0529   HEMOGLOBIN A1C % 5 5   , Lipid Profile:   Results from last 7 days   Lab Units 02/22/21  0529   HDL mg/dL 74   LDL CALC mg/dL 94   TRIGLYCERIDES mg/dL 78     VTE Prophylaxis: Sequential compression device (Venodyne)     Counseling / Coordination of Care  Total time spent today 30 minutes  Greater than 50% of total time was spent with the patient and / or family counseling and / or coordination of care  A description of the counseling / coordination of care: Discussed results of MRI brain with patient and discussed importance of controlling blood pressure as outpatient and follow-up with Neurology, as well as for outpatient cardiac monitoring    Discussed with primary team

## 2021-02-23 NOTE — DISCHARGE INSTRUCTIONS
Please present to [facility] ([address] [facility]) upon discharge to have an event monitor placed for monitoring of your heart rhythm           Please follow up with Neurology as outpatient      Please follow up with Neurosurgery as outpatient for imaging in the future

## 2021-02-23 NOTE — TELEPHONE ENCOUNTER
Reviewed patient chart, inpatient at [facility] at this time  Admitted under stroke pathway  Plan per inpatient therapy recommendations is for patient to be discharged to home when medically cleared (discharge being written currently)  Called patient on hospital room phone, I introduced myself and explained neurovascular nurse navigator role  We discussed follow up care, stroke education, and discharge planning  We reviewed stroke type, symptoms, personal risk factors and management, medications, and resources  Patient verbalizes understanding of teaching  Offered to send stroke education binder and my card in the mail, patient is agreeable to this  Verified mailing address in demographics tab then mailed the information as requested  Informed patient I will continue to follow up during hospitalization with inpatient neurology recommendations as well as after hospital discharge to assist  she verbalizes understanding and is agreeable to plan  She asked about information for a new PCP and I offered to provide her with [facility]  I offered to call and leave a voicemail with this information, she is agreeable to this  She states I can leave this info with  Ramsey Barone  I addressed all her questions  At the conclusion of the conversation, patient denies having any further questions or concerns  ____    [place] [phone] with no answer and no option to leave a voicemail (line rings then disconnects)  Tried calling again, there was no answer, no option to leave a voicemail  Looked at [facility]  [place], but it appears his number on file is also the [phone]  Will have to follow up (may start new encounter after discharge)

## 2021-02-23 NOTE — PROGRESS NOTES
Patient underwent CTA head to rule out vascular etiology for multifocal hemorrhages  Imaging demonstrated stable right occiput low hemorrhage with interventricular extension and left temporal lobe cystic/lesion  No evidence significant stenosis aneurysm or dissection/vascular malformation  Patient clear discharge from neurosurgical standpoint as no surgical intervention is indicated at this juncture  Will plan outpatient follow-up in two weeks with repeat CT head without contrast which has been ordered and office foramen has been scheduled  Given abnormal left temporal cyst/lesion will plan tentatively for repeat MRI brain in three months  Call if any questions or concerns

## 2021-02-23 NOTE — PLAN OF CARE
Problem: Prexisting or High Potential for Compromised Skin Integrity  Goal: Skin integrity is maintained or improved  Description: INTERVENTIONS:  - Identify patients at risk for skin breakdown  - Assess and monitor skin integrity  - Assess and monitor nutrition and hydration status  - Monitor labs   - Assess for incontinence   - Turn and reposition patient  - Assist with mobility/ambulation  - Relieve pressure over bony prominences  - Avoid friction and shearing  - Provide appropriate hygiene as needed including keeping skin clean and dry  - Evaluate need for skin moisturizer/barrier cream  - Collaborate with interdisciplinary team   - Patient/family teaching  - Consider wound care consult   Outcome: Completed     Problem: Potential for Falls  Goal: Patient will remain free of falls  Description: INTERVENTIONS:  - Assess patient frequently for physical needs  -  Identify cognitive and physical deficits and behaviors that affect risk of falls  -  [place] fall precautions as indicated by assessment   - Educate patient/family on patient safety including physical limitations  - Instruct patient to call for assistance with activity based on assessment  - Modify environment to reduce risk of injury  - Consider OT/PT consult to assist with strengthening/mobility  Outcome: Completed     Problem: Neurological Deficit  Goal: Neurological status is stable or improving  Description: Interventions:  - Monitor and assess patient's level of consciousness, motor function, sensory function, and level of assistance needed for ADLs  - Monitor and report changes from baseline  Collaborate with interdisciplinary team to initiate plan and implement interventions as ordered  - Provide and maintain a safe environment  - Consider seizure precautions  - Consider fall precautions  - Consider aspiration precautions  - Consider bleeding precautions  Outcome: Completed     Problem:  Activity Intolerance/Impaired Mobility  Goal: Mobility/activity is maintained at optimum level for patient  Description: Interventions:  - Assess and monitor patient  barriers to mobility and need for assistive/adaptive devices  - Assess patient's emotional response to limitations  - Collaborate with interdisciplinary team and initiate plans and interventions as ordered  - Encourage independent activity per ability   - Maintain proper body alignment  - Perform active/passive rom as tolerated/ordered  - Plan activities to conserve energy   - Turn patient as appropriate  Outcome: Completed     Problem: Communication Impairment  Goal: Ability to express needs and understand communication  Description: Assess patient's communication skills and ability to understand information  Patient will demonstrate use of effective communication techniques, alternative methods of communication and understanding even if not able to speak  - Encourage communication and provide alternate methods of communication as needed  - Collaborate with case management/ for discharge needs  - Include patient/family/caregiver in decisions related to communication  Outcome: Completed     Problem: Potential for Aspiration  Goal: Non-ventilated patient's risk of aspiration is minimized  Description: Assess and monitor vital signs, respiratory status, and labs (WBC)  Monitor for signs of aspiration (tachypnea, cough, rales, wheezing, cyanosis, fever)  - Assess and monitor patient's ability to swallow  - Place patient up in chair to eat if possible  - HOB up at 90 degrees to eat if unable to get patient up into chair   - Supervise patient during oral intake  - Instruct patient/ family to take small bites  - Instruct patient/ family to take small single sips when taking liquids    - Follow patient-specific strategies generated by speech pathologist   Outcome: Completed  Goal: Ventilated patient's risk of aspiration is minimized  Description: Assess and monitor vital signs, respiratory status, airway cuff pressure, and labs (WBC)  Monitor for signs of aspiration (tachypnea, cough, rales, wheezing, cyanosis, fever)  - Elevate head of bed 30 degrees if patient has tube feeding   - Monitor tube feeding  Outcome: Completed     Problem: Nutrition  Goal: Nutrition/Hydration status is improving  Description: Monitor and assess patient's nutrition/hydration status for malnutrition (ex- brittle hair, bruises, dry skin, pale skin and conjunctiva, muscle wasting, smooth red tongue, and disorientation)  Collaborate with interdisciplinary team and initiate plan and interventions as ordered  Monitor patient's weight and dietary intake as ordered or per policy  Utilize nutrition screening tool and intervene per policy  Determine patient's food preferences and provide high-protein, high-caloric foods as appropriate  - Assist patient with eating   - Allow adequate time for meals   - Encourage patient to take dietary supplement as ordered  - Collaborate with clinical nutritionist   - Include patient/family/caregiver in decisions related to nutrition    Outcome: Completed     Problem: PAIN - ADULT  Goal: Verbalizes/displays adequate comfort level or baseline comfort level  Description: Interventions:  - Encourage patient to monitor pain and request assistance  - Assess pain using appropriate pain scale  - Administer analgesics based on type and severity of pain and evaluate response  - Implement non-pharmacological measures as appropriate and evaluate response  - Consider cultural and social influences on pain and pain management  - Notify physician/advanced practitioner if interventions unsuccessful or patient reports new pain  Outcome: Completed     Problem: INFECTION - ADULT  Goal: Absence or prevention of progression during hospitalization  Description: INTERVENTIONS:  - Assess and monitor for signs and symptoms of infection  - Monitor lab/diagnostic results  - Monitor all insertion sites, i e  indwelling lines, tubes, and drains  - Monitor endotracheal if appropriate and nasal secretions for changes in amount and color  - [place] appropriate cooling/warming therapies per order  - Administer medications as ordered  - Instruct and encourage patient and family to use good hand hygiene technique  - Identify and instruct in appropriate isolation precautions for identified infection/condition  Outcome: Completed     Problem: SAFETY ADULT  Goal: Patient will remain free of falls  Description: INTERVENTIONS:  - Assess patient frequently for physical needs  -  Identify cognitive and physical deficits and behaviors that affect risk of falls    -  [place] fall precautions as indicated by assessment   - Educate patient/family on patient safety including physical limitations  - Instruct patient to call for assistance with activity based on assessment  - Modify environment to reduce risk of injury  - Consider OT/PT consult to assist with strengthening/mobility  Outcome: Completed  Goal: Maintain or return to baseline ADL function  Description: INTERVENTIONS:  -  Assess patient's ability to carry out ADLs; assess patient's baseline for ADL function and identify physical deficits which impact ability to perform ADLs (bathing, care of mouth/teeth, toileting, grooming, dressing, etc )  - Assess/evaluate cause of self-care deficits   - Assess range of motion  - Assess patient's mobility; develop plan if impaired  - Assess patient's need for assistive devices and provide as appropriate  - Encourage maximum independence but intervene and supervise when necessary  - Involve family in performance of ADLs  - Assess for home care needs following discharge   - Consider OT consult to assist with ADL evaluation and planning for discharge  - Provide patient education as appropriate  Outcome: Completed  Goal: Maintain or return mobility status to optimal level  Description: INTERVENTIONS:  - Assess patient's baseline mobility status (ambulation, transfers, stairs, etc )    - Identify cognitive and physical deficits and behaviors that affect mobility  - Identify mobility aids required to assist with transfers and/or ambulation (gait belt, sit-to-stand, lift, walker, cane, etc )  - [place] fall precautions as indicated by assessment  - Record patient progress and toleration of activity level on Mobility SBAR; progress patient to next Phase/Stage  - Instruct patient to call for assistance with activity based on assessment  - Consider rehabilitation consult to assist with strengthening/weightbearing, etc   Outcome: Completed     Problem: DISCHARGE PLANNING  Goal: Discharge to home or other facility with appropriate resources  Description: INTERVENTIONS:  - Identify barriers to discharge w/patient and caregiver  - Arrange for needed discharge resources and transportation as appropriate  - Identify discharge learning needs (meds, wound care, etc )  - Arrange for interpretive services to assist at discharge as needed  - Refer to Case Management Department for coordinating discharge planning if the patient needs post-hospital services based on physician/advanced practitioner order or complex needs related to functional status, cognitive ability, or social support system  Outcome: Completed     Problem: Knowledge Deficit  Goal: Patient/family/caregiver demonstrates understanding of disease process, treatment plan, medications, and discharge instructions  Description: Complete learning assessment and assess knowledge base    Interventions:  - Provide teaching at level of understanding  - Provide teaching via preferred learning methods  Outcome: Completed     Problem: NEUROSENSORY - ADULT  Goal: Achieves stable or improved neurological status  Description: INTERVENTIONS  - Monitor and report changes in neurological status  - Monitor vital signs such as temperature, blood pressure, glucose, and any other labs ordered   - Initiate measures to prevent increased intracranial pressure  - Monitor for seizure activity and implement precautions if appropriate      Outcome: Completed  Goal: Remains free of injury related to seizures activity  Description: INTERVENTIONS  - Maintain airway, patient safety  and administer oxygen as ordered  - Monitor patient for seizure activity, document and report duration and description of seizure to physician/advanced practitioner  - If seizure occurs,  ensure patient safety during seizure  - Reorient patient post seizure  - Seizure pads on all 4 side rails  - Instruct patient/family to notify RN of any seizure activity including if an aura is experienced  - Instruct patient/family to call for assistance with activity based on nursing assessment  - Administer anti-seizure medications if ordered    Outcome: Completed  Goal: Achieves maximal functionality and self care  Description: INTERVENTIONS  - Monitor swallowing and airway patency with patient fatigue and changes in neurological status  - Encourage and assist patient to increase activity and self care  - Encourage visually impaired, hearing impaired and aphasic patients to use assistive/communication devices  Outcome: Completed     Problem: Nutrition/Hydration-ADULT  Goal: Nutrient/Hydration intake appropriate for improving, restoring or maintaining nutritional needs  Description: Monitor and assess patient's nutrition/hydration status for malnutrition  Collaborate with interdisciplinary team and initiate plan and interventions as ordered  Monitor patient's weight and dietary intake as ordered or per policy  Utilize nutrition screening tool and intervene as necessary  Determine patient's food preferences and provide high-protein, high-caloric foods as appropriate       INTERVENTIONS:  - Monitor oral intake, urinary output, labs, and treatment plans  - Assess nutrition and hydration status and recommend course of action  - Evaluate amount of meals eaten  - Assist patient with eating if necessary   - Allow adequate time for meals  - Recommend/ encourage appropriate diets, oral nutritional supplements, and vitamin/mineral supplements  - Order, calculate, and assess calorie counts as needed  - Recommend, monitor, and adjust tube feedings and TPN/PPN based on assessed needs  - Assess need for intravenous fluids  - Provide specific nutrition/hydration education as appropriate  - Include patient/family/caregiver in decisions related to nutrition  Outcome: Completed

## 2021-02-23 NOTE — CASE MANAGEMENT
CM met pt at bedside to discuss dcp  CM informed pt that therapy's recommendation home PT  Pt was agreeable and preferred referral to [facility] in [place]  Patient meets criteria for Care at Home services with Dr Eleuterio Lennox acceptance  Requested attending consult Geriatrics  Notification to [facility] AT [facility]

## 2021-02-23 NOTE — PROGRESS NOTES
Kt Rojas is here today under the direction of Yadira Sears PA-C for 4 week Zio patch  First patch placed in office today  Patch applied and all instructions for use given to patient in office

## 2021-02-23 NOTE — DISCHARGE SUMMARY
Discharge Summary - [facility]    Patient Information: Chelly Muniz 67 y o  female MRN: [id number]  Unit/Bed#: [facility] 639-32 Encounter: [id number]    Discharging Physician / Practitioner: Beverly Shah PA-C  PCP: Jaquan Loving MD  Admission Date: 2/21/2021  Discharge Date: 02/23/21    Reason for Admission: [address]    Discharge Diagnoses:     Principal Problem:    Subdural hemorrhage ([facility] )  Active Problems:    Hypertension    Headache    ICH (intracerebral hemorrhage) ([facility] [address] )    IV[facility] (intraventricular hemorrhage) ([facility] )    Epidermoid cyst of brain  Resolved Problems:    * No resolved hospital problems  *      Consultations During Hospital Stay:  · Neurosurgery  · Neurology  · PT  · OT    Procedures Performed:   · Chest x-ray  · CT head  · MRI brain  · CTA head and neck  · Echo  · CMP/BMP  · Troponin  · Lipid panel  · CBC  · PT/INR  · HbA1c    Significant Findings:   · Chest x-ray:  No acute cardiopulmonary disease per the results report  · CT head:  Right occipital lobe hemorrhage and intraventricular hemorrhage in the occipital horn  Subdural blood along the right tentorium  Additional hypodensity in the left upper lobe  Given no history of trauma, findings likely represent either hemorrhagic metastasis or stroke  Recommend MRI brain with contrast  · MRI brain:  Unchanged 2 4 x 1 4 x 1 6 cm right occipital hemorrhage  No apparent underlying mass or enhancement  T1, T2 and diffusion hyperintensity 15 mm left temporal lesion is unchanged from the recent CT and 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   · CTA head and neck:  Unchanged areas of right occipital lobe hemorrhage, intraventricular hemorrhage and left temporal lobe cystic area/lesion  Unchanged subdural hemorrhage along the right cerebellar falx  No evidence of hemodynamic significant stenosis, aneurysm, or dissection  · Echo:  Left ventricular ejection fraction 70%  Grade 1 diastolic dysfunction  Mildly dilated left atrium  Trace mitral valve regurgitation  Mild aortic valve regurgitation  · Lipid panel:  Cholesterol 184, triglycerides 78, HDL 74, LDL 94  · HbA1c:  5 5    Incidental Findings:   · MRI brain:  T1, T2 and diffusion hyperintense 15 mm left temporal lesion is unchanged from the recent CT and 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    Test Results Pending at Discharge (will require follow up): · None     Outpatient Tests Requested:  · Outpatient follow-up in 2 weeks with Neurosurgery with repeat CT head and MRI brain in 3 months  · Zio patch with EP and Neurology  · Follow up with Neurology as outpatient  · Follow up with PCP    Complications:  None    Hospital Course:     Evangelina Roque is a 67 y o  female with HTN and HLD who originally presented to [facility] on 2/21/2021 due to vision changes x3 days and headache  CT head revealed right occipital lobe hemorrhage and intraventricular hemorrhage in the occipital horn, subdural blood along the right tentorium, additional hypodensity in the left temporal lobe  Neurosurgery was consulted and recommended MRI of the brain  MRI brain revealed unchanged right occipital hemorrhage, no apparent underlying mass or enhancement and findings 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 per the results report  Patient was placed on Cardene drip for blood pressure management  The patient was transferred to [facility] ICU  Cardene GTT was able to be discontinued  Neurosurgery recommended systolic blood pressure less than 160  No neurosurgical intervention per Neurosurgery  Neurology was also consulted  Patient not on antiplatelets or anticoagulation  Patient was transferred out of ICU on 1/22/21   Neurology recommending outpatient cardiac event monitor, Zio patch, for which EP was contacted regarding and arranging for - patient to go to their office on [address] today before 4pm  Patient and son/LUI aware  Neurosurgery recommending outpatient follow-up in 2 weeks with CT head and MRI brain in 3 months  PT recommended home with skilled therapy  Neurology and Neurosurgery cleared for discharge  I discussed with Neurosurgery, Neurology, EP, nurse, and  as well as the the patient and her son/Lui at bedside on day of discharge  Condition at Discharge: stable     Discharge Day Visit / Exam:     Subjective:    Ms Chandrika Sandoval reports that she was having eye discomfort but this resolved  Denies HA, numbness, tingling, weakness, CP, SOB, palpitations, abdominal pain    Vitals: Blood Pressure: 125/57 (02/22/21 2135)  Pulse: 74 (02/22/21 2135)  Temperature: 98 3 °F (36 8 °C) (02/22/21 2135)  Temp Source: Oral (02/22/21 0800)  Respirations: 17 (02/22/21 2135)  Height: 5' 1" (154 9 cm) (02/22/21 1516)  Weight - Scale: 56 3 kg (124 lb 1 9 oz) (02/22/21 1514)  SpO2: 94 % (02/22/21 2135)  Exam:   Physical Exam  Vitals signs and nursing note reviewed  Constitutional:       Comments: Patient seen sitting upright comfortably resting in bedside chair watching TV  Very pleasant and cooperative  No acute distress  Cardiovascular:      Rate and Rhythm: Normal rate and regular rhythm  Pulmonary:      Effort: Pulmonary effort is normal       Breath sounds: Normal breath sounds  Abdominal:      General: Bowel sounds are normal       Palpations: Abdomen is soft  Tenderness: There is no abdominal tenderness  Musculoskeletal:      Right lower leg: No edema  Left lower leg: No edema  Skin:     General: Skin is warm and dry  Neurological:      Mental Status: She is alert and oriented to person, place, and time  Psychiatric:         Mood and Affect: Mood normal          Behavior: Behavior normal          Discharge instructions/Information to patient and family:   See after visit summary for information provided to patient and family        Provisions for Follow-Up Care:  See after visit summary for information related to follow-up care and any pertinent home health orders  Disposition:     Home with VNA Services (Reminder: Complete face to face encounter)    For Discharges to [facility]:   · Not Applicable to this Patient - Not Applicable to this Patient    Planned Readmission: None     Discharge Statement:  I spent 40 minutes discharging the patient  This time was spent on the day of discharge  I had direct contact with the patient on the day of discharge  Greater than 50% of the total time was spent examining patient, answering all patient questions, arranging and discussing plan of care with patient as well as directly providing post-discharge instructions  Additional time then spent on discharge activities  Discharge Medications:  See after visit summary for reconciled discharge medications provided to patient and family  ** Please Note: Dragon 360 Dictation voice to text software may have been used in the creation of this document   **

## 2021-02-24 ENCOUNTER — PATIENT OUTREACH (OUTPATIENT)
Dept: CASE MANAGEMENT | Facility: OTHER | Age: 76
End: 2021-02-24

## 2021-02-24 ENCOUNTER — TELEPHONE (OUTPATIENT)
Dept: NEUROLOGY | Facility: CLINIC | Age: 76
End: 2021-02-24

## 2021-02-24 LAB
ATRIAL RATE: 77 BPM
P AXIS: 92 DEGREES
PR INTERVAL: 152 MS
QRS AXIS: 46 DEGREES
QRSD INTERVAL: 110 MS
QT INTERVAL: 376 MS
QTC INTERVAL: 418 MS
T WAVE AXIS: 78 DEGREES
VENTRICULAR RATE: 74 BPM

## 2021-02-24 PROCEDURE — 93010 ELECTROCARDIOGRAM REPORT: CPT | Performed by: INTERNAL MEDICINE

## 2021-02-24 NOTE — PROGRESS NOTES
Outreach TC to patient for initial care management assessment  Patient reports that she is feeling well  Patient denies s/sx of CVA including unilateral weakness, speech or swallowing difficulties, confusion, vision changes or sudden and severe dizziness  Patient reports that she is independent with ADL's  Patient does not need assist with IADLs  Patient resides with spouse who assists when needed   Patient did not yet complete her [facility] appointment but has made a f/u with cardiology for 3/3/21 and with neurosurgery for 3/9/21  Patient encouraged to keep appointments  Reviewed medications including dose, schedule, purpose & side effects of amlodipine, simvastatin and Vitamin D3  with patient who verbalized understanding  Reviewed future appointments, s/sx to report and how/when to report symptoms to provider vs  911  Patient verbalizes understanding  Educated on low sodium diet including label reading  Patient verbalizes understanding  Patient educated on role of CM, contact information for CM and BPCI program  Patient agrees to additional calls

## 2021-02-26 ENCOUNTER — TELEPHONE (OUTPATIENT)
Dept: NEUROLOGY | Facility: CLINIC | Age: 76
End: 2021-02-26

## 2021-02-26 NOTE — TELEPHONE ENCOUNTER
Post IVH Discharge Follow Up    Hospitalization: 2/21-2/23  The purpose of this phone call is to assess patient's general wellbeing or for any assistance needed with follow-up care  Called [phone], reached   He states the patient is not available at this time  I left my number for call back

## 2021-03-01 NOTE — TELEPHONE ENCOUNTER
Called, reached patient, since discharge, she denies experiencing any new or worsening stroke-like symptoms  She continues to have vision deficit when walking up in in the morning  Normalizes  States she continues to have right face / head pain around the eye  Worse in the morning with vision change  Gets better throughout the day  Can't describe vision change - states everything seems duller or brighter  Ambulation / ADLs:  she is ambulating independently as well as preforming her own ADLs  Patient manages her own medications, appointments, and affairs  Appointments / Medication Review:  Reviewed appointments - patient plans to change her PCP  I provided her the [email or web address] info link number  She is scheduled for repeat CT 3/3, cardiology 3/3, and Dr Wili Marie 4/9    I reviewed medications with her  There have not been any medication changes since discharge from the hospital  Reports having no difficulties obtaining medications  Reports she is taking all as prescribed with no missed doses, medication side effects, or signs of bleeding  Risk Factors / Education:  Patient verbalizes understanding understanding of a stroke type, symptoms, personal risk factors and management, medications, and resources  As for risk factors, patient reports they have been managing modifiable risk factors in the the following ways:   BP is monitored at home  Reported average -126 states her machine is not reliable  Because these readings are within 5 min or so and she has to get a new one     she is a non smoker  she reports following a low salt diet  Also with less caffeine  I addressed all her questions  At the conclusion of the conversation, patient denies having any further questions or concerns

## 2021-03-02 NOTE — PROGRESS NOTES
General Cardiology   Outpatient OV Consult -   Tariq Gaitan   67 y o    female   MRN: [id number]  [address]  [address]  [place] [facility]  [facility] [id number] [phone]    PCP:  Stephany Russell MD   cardiologist:  Will be Dr Phillip Tran  prior: Dr Emanuel Segundo [facility]     Chief Complaint   Patient presents with   [address] f/u subdural occipital hemmorage               Summary of Recommendations  4 week ZIO Patch ambulatory Holter monitor  Pending  Applied 2/23/21  carotid duplex  STOP simvastatin   Start atorvastatin 40 mg/d  Follow-up lipid profile 8 weeks  Increase amlodipine to 10 mg daily   Start Lisinopril 10 mg daily   BMP 1 week  periodic home blood pressure monitoring  Her blood pressure machine was validated today in our office  heart healthy, low-sodium diet  Provided info on DASH and Mediterranean diets  Follow-up with cardiologist Dr Phillip Tran 1 month            Impression/plan  ICH / subdural hemorrhage/ intraventricular hemorrhage 2/21/21  Adm 2/21-2/23/21  F/U CTH today, F/U neurology  Hypertension, essential   /60 on amlodipine 5 mg daily  · Avoid NSAIDs; avoid decongestants; Low-sodium diet; Home blood pressure monitoring periodically  -->  Increase amlodipine 10 mg daily  Add lisinopril 10 mg daily  hyperlipidemia  On simvastatin 20 mg daily  2/22/21: LDL 94, non   --> stop simvastatin  Start atorvastatin 40 mg daily  Heart  Healthy diet  Reassess 8 weeks  Moderate atherosclerotic disease of the aortic arch and great vessels by CTA 2/22/21  Moderate atherosclerotic disease of bilateral bifurcations extracranial carotid segments   ICH / subdural hemorrhage/ intraventricular hemorrhage 2/21/21  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  anxiety  Cardiac testing  --TTE 3/3/2020  EF 60 percent Normal RV size and function  Structurally normal aortic valve  Mild AI  Mild diastolic dysfunction with normal filling pressures    --carotid duplex 11/2018- right: Less than 50 percent stenosis in the ICA  Left:  Less than 50 percent in the ICA   --5/11/20  Stress echo [facility])  Negative exercise EKG for ischemia  Negative stress echo for ischemia  Duration of exercise 6 minutes, 7 1 Mets of activity  -- TTE 2/22/21 EF 70%  No RWMA  Grade 1 DD  RV normal   Mild LAE  Mild AI  LA 3 6 cm  -- 4 week ambulatory Holter monitor: pending          HPI:   Kirstin Smiley is a 67 y o  female with essential hypertension  Recently, she experienced visual changes for 3-5 days, and a headache for 1-2 days  She had recent bilateral cataract surgery in December 2020, and saw Dr Avi Grayson cardiologist at [facility] preoperatively    She presented to the hospital  2/21-2/23/21 on the advised her Optometrist   Imaging demonstrated intracerebral hemorrhage/ both subdural hematoma and intraventricular hemorrhage and an epidermal cyst of the brain   presenting BP was 180/75  She was followed by Neurology and Neurosurgery  Neurology suspected she may have had an ischemic stroke initially with hemorrhagic conversion  Alternatively, could be cerebral amyloid angiopathy  There was no indication for Neurosurgery  An echocardiogram showed normal LV function,  Normal RV function,mild AI and mild left atrial enlargement  Her EKG February 21st, personally reviewed showed sinus rhythm, incomplete right bundle branch block and nonspecific ST T-wave changes  An ambulatory cardiac event monitor was requested by Neurology  A 4 week ZIO [facility]  3/3/21  New patient evaluation,  Post hospitalization as above  ICH/ intraventricular hemorrhage  Neurology suspected she may have had an ischemic stroke initially with hemorrhagic conversion  Alternatively, could be cerebral amyloid angiopathy  she is accompanied by her   She tells me she has some visual disturbances, and some issues with her memory    She has no slurred speech, weakness or ambulatory dysfunction  She denies any chest pain or shortness of breath  She currently denies any palpitations but admits in the past she occasionally did he feel a fluttering lasting a couple seconds  She did see a cardiologist from [facility], Dr Win last March, prior to her cataract surgeries  In this setting she had an exercise stress echocardiogram that showed no evidence of ischemia  Her systolic blood pressures back then was in the 140s, on amlodipine 5 mg daily which she has been on chronically  She has also been on simvastatin  20 mg daily for a long time as well  Historically she has been very active, walking her dog and gardening  She does not exercise  Since her stroke she was advised not partake in exercise at this time  She does have a home blood pressure monitor  He was validated in the office today for accuracy  She has been checking her blood pressures at home  Her systolic has been running around 150 sometimes 160   blood pressure in the office is elevated today, 180/60  Today, I will increase her amlodipine to 10 mg daily, add lisinopril 10 mg  Will also stop simvastatin and switch her to atorvastatin 40 for baseline LDL of 94 on the simvastatin 20 mg daily  I strongly emphasized adherence to a heart healthy diet particularly low-sodium/ dash diet  Written material was provided  She is currently wearing the ambulatory Holter monitor, without problems  Will follow-up BMP in a week given the addition of her ACE-inhibitor  Will reassess her lipids in 2 months  She will return to see a new cardiologist in this office in a month        FH sister PTCA/ CABG in her 76s  [address]- no tobacco  occ ETOH- 1 beer/d at the most   particularly in the summer  NO drugs      Assessment:  Diagnoses and all orders for this visit:    Essential hypertension  -     amLODIPine (NORVASC) 5 mg tablet;  Take 2 tablets (10 mg total) by mouth daily  -     lisinopril (ZESTRIL) 10 mg tablet; Take 1 tablet (10 mg total) by mouth daily  -     Basic metabolic panel; Future  -     VAS carotid complete study; Future    Pure hypercholesterolemia  -     atorvastatin (LIPITOR) 40 mg tablet; Take 1 tablet (40 mg total) by mouth daily  -     VAS carotid complete study; Future  -     Lipid panel; Future    Atrial dilatation    Multiple right-sided nontraumatic localized intracerebral hemorrhages (HCC)    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, nonpyogenic (HCC)   -     VAS carotid complete study; Future              Past Medical History:   Diagnosis Date    Glaucoma     High blood pressure 11/30/2018    Panic attacks 1998    Papanicolaou smear 03/17/2016    NEG    Post-menopausal        Review of Systems   Constitution: Negative for chills  Eyes:        Persistent visual disturbances   Cardiovascular: Negative for chest pain, claudication, cyanosis, dyspnea on exertion, irregular heartbeat, leg swelling, near-syncope, orthopnea, palpitations, paroxysmal nocturnal dyspnea and syncope  Respiratory: Negative for cough and shortness of breath  Gastrointestinal: Negative for heartburn and nausea  Neurological: Negative for dizziness, focal weakness, headaches, light-headedness and weakness  Memory issues   All other systems reviewed and are negative  Allergies   Allergen Reactions    Codeine     Sulfa Antibiotics            Current Outpatient Medications:     amLODIPine (NORVASC) 5 mg tablet, Take 2 tablets (10 mg total) by mouth daily, Disp: , Rfl:     atorvastatin (LIPITOR) 40 mg tablet, Take 1 tablet (40 mg total) by mouth daily, Disp: 30 tablet, Rfl: 5    lisinopril (ZESTRIL) 10 mg tablet, Take 1 tablet (10 mg total) by mouth daily, Disp: 30 tablet, Rfl: 3    Social History     Socioeconomic History    Marital status: /Civil Union     Spouse name: Not on file    Number of children: 4    Years of education: Not on file    Highest education level: Not on file   Occupational History    Occupation: Retired   Social Needs    Financial resource strain: Not on file    Food insecurity     Worry: Not on file     Inability: Not on file   [employer] needs     Medical: Not on file     Non-medical: Not on file   Tobacco Use    Smoking status: Never Smoker    Smokeless tobacco: Never Used   Substance and Sexual Activity    Alcohol use: Yes     Comment: social    Drug use: No    Sexual activity: Not Currently     Partners: Male     Birth control/protection: Post-menopausal   Lifestyle    Physical activity     Days per week: Not on file     Minutes per session: Not on file    Stress: Not on file   Relationships    Social connections     Talks on phone: Not on file     Gets together: Not on file     Attends [employer] service: Not on file     Active member of club or organization: Not on file     Attends meetings of clubs or organizations: Not on file     Relationship status: Not on file    Intimate partner violence     Fear of current or ex partner: Not on file     Emotionally abused: Not on file     Physically abused: Not on file     Forced sexual activity: Not on file   Other Topics Concern    Not on file   Social History Narrative    Not on file       Family History   Problem Relation Age of Onset    Heart disease Sister        Physical Exam   Constitutional: She is oriented to person, place, and time  No distress  HENT:   Head: Normocephalic and atraumatic  Eyes: Conjunctivae and EOM are normal    Neck: Normal range of motion  Neck supple  Cardiovascular: Normal rate, regular rhythm, normal heart sounds and intact distal pulses  Pulmonary/Chest: Effort normal and breath sounds normal    Abdominal: Soft  Bowel sounds are normal    Musculoskeletal: Normal range of motion  Neurological: She is alert and oriented to person, place, and time  Skin: Skin is warm and dry  She is not diaphoretic  Psychiatric: She has a normal mood and affect  Nursing note and vitals reviewed  Vitals: Blood pressure (!) 180/60, pulse 92, height 5' 1" (1 549 m), weight 55 4 kg (122 lb 3 2 oz), SpO2 97 %, not currently breastfeeding  Wt Readings from Last 3 Encounters:   21 55 4 kg (122 lb 3 2 oz)   21 56 3 kg (124 lb 1 9 oz)   21 55 kg (121 lb 4 1 oz)         Labs & Results:  Lab Results   Component Value Date    WBC 6 63 2021    HGB 12 3 2021    HCT 38 4 2021    MCV 90 2021     2021     No results found for: BNP  No components found for: CHEM  Results for orders placed during the hospital encounter of 21   Echo complete with contrast if indicated    Narrative [facility]  [facility], [address] [place] [address]  [phone]    Transthoracic Echocardiogram  2D, M-mode, Doppler, and Color Doppler    Study date:  2021    Patient: Char Remy  MR number: [id number]  Account number: [de-identified]  : 1945  Age: 67 years  Gender: Female  Status: Inpatient  Location: Bedside  Height: 61 in  Weight: 124 lb  BP: 144/ 64 mmHg    Indications: Hypertension  Diagnoses: I10  - Essential (primary) hypertension    Sonographer:  Nadia Rivera RDCS  Primary Physician:  Monta Riedel, MD  Referring Physician:  Abigail Bautista MD  Group:  [id number]  Cardiology Fellow: Abigail Beck MD  Interpreting Physician:  Brandon Loving MD    SUMMARY    LEFT VENTRICLE:  Size was normal   Systolic function was vigorous  Ejection fraction was estimated to be 70 %  Although no diagnostic regional wall motion abnormality was identified, this possibility cannot be completely excluded on the basis of this study  Wall thickness was mildly increased  Doppler parameters were consistent with abnormal left ventricular relaxation (grade 1 diastolic dysfunction)      RIGHT VENTRICLE:  The size was normal   Systolic function was normal     LEFT ATRIUM:  The atrium was mildly dilated  MITRAL VALVE:  There was trace regurgitation  AORTIC VALVE:  The valve was trileaflet  Leaflets exhibited mildly to moderately increased thickness, mild calcification, normal cuspal separation, and sclerosis  There was mild regurgitation  HISTORY: PRIOR HISTORY: Hypertension  Subdural hemorrhage  PROCEDURE: The procedure was performed at the bedside  This was a routine study  The transthoracic approach was used  The study included complete 2D imaging, M-mode, complete spectral Doppler, and color Doppler  The heart rate was 73 bpm,  at the start of the study  Images were obtained from the parasternal, apical, subcostal, and suprasternal notch acoustic windows  Image quality was adequate  LEFT VENTRICLE: Size was normal  Systolic function was vigorous  Ejection fraction was estimated to be 70 %  Although no diagnostic regional wall motion abnormality was identified, this possibility cannot be completely excluded on the  basis of this study  Wall thickness was mildly increased  There was mild concentric hypertrophy  DOPPLER: Doppler parameters were consistent with abnormal left ventricular relaxation (grade 1 diastolic dysfunction)  RIGHT VENTRICLE: The size was normal  Systolic function was normal     LEFT ATRIUM: The atrium was mildly dilated  RIGHT ATRIUM: Size was normal     MITRAL VALVE: There was mild annular calcification  Valve structure was normal  There was normal leaflet separation  DOPPLER: The transmitral velocity was within the normal range  There was no evidence for stenosis  There was trace  regurgitation  AORTIC VALVE: The valve was trileaflet  Leaflets exhibited mildly to moderately increased thickness, mild calcification, normal cuspal separation, and sclerosis  DOPPLER: Transaortic velocity was within the normal range  There was no  evidence for stenosis  There was mild regurgitation      TRICUSPID VALVE: The valve structure was normal  There was normal leaflet separation  DOPPLER: The transtricuspid velocity was within the normal range  There was no evidence for stenosis  There was trace regurgitation  The tricuspid jet  envelope definition was inadequate for estimation of RV systolic pressure  PULMONIC VALVE: DOPPLER: The transpulmonic velocity was within the normal range  There was trace regurgitation  PERICARDIUM: There was no pericardial effusion  A pericardial fat pad was present  AORTA: The root exhibited normal size  SYSTEMIC VEINS: IVC: The inferior vena cava was normal in size and course  Respirophasic changes were normal     SYSTEM MEASUREMENT TABLES    2D mode  AV Diam: 3 cm  AoR Diam; Mean (2D): 3 cm  LA Diam (2D): 3 6 cm  LA Dimension; Mean (2D): 3 6 cm  LA/Ao (2D): 1 2  EDV (2D-Cubed): 59 3 cm3  EF (2D-Cubed): 76 7 %  ESV (2D-Cubed): 13 8 cm3  FS (2D-Cubed): 38 5 %  FS (2D-Teich): 38 5 %  IVS/LVPW (2D): 1  IVSd (2D): 0 9 cm  IVSd; Mean chosen (2D): 0 9 cm  LVIDd (2D): 3 9 cm  LVIDd; Mean (2D): 3 9 cm  LVIDs (2D): 2 4 cm  LVIDs; Mean (2D): 2 4 cm  LVPWd (2D): 0 9 cm  LVPWd; Mean (2D): 0 9 cm  Left Ventricular Ejection Fraction; Teichholz; 2D mode;: 69 3 %  Left Ventricular End Diastolic Volume; Teichholz; 2D mode;: 65 9 cm3  Left Ventricular End Systolic Volume; Teichholz; 2D mode;: 20 2 cm3  SV (2D-Cubed): 45 5 cm3  Stroke Volume; Teichholz; 2D mode;: 45 7 cm3  RVIDd (2D): 3 1 cm  RVIDd; Mean (2D): 3 1 cm  Right and Left Ventricular End Diastolic Diameter Ratio; 2D mode;: 0 8    Apical four chamber  LV MOD Diam; Recent value; End Diastole (A4C): 4 96 cm  LV MOD Diam; Recent value; End Diastole (A4C): 4 67 cm  LV MOD Diam; Recent value; End Diastole (A4C): 4 59 cm  LV MOD Diam; Recent value; End Diastole (A4C): 4 4 cm  LV MOD Diam; Recent value; End Diastole (A4C): 4 21 cm  LV MOD Diam; Recent value; End Diastole (A4C): 4 08 cm  LV MOD Diam; Recent value; End Diastole (A4C): 4 cm  LV MOD Diam; Recent value;  End Diastole (A4C): 3 9 cm  LV MOD Diam; Recent value; End Diastole (A4C): 3 71 cm  LV MOD Diam; Recent value; End Diastole (A4C): 3 5 cm  LV MOD Diam; Recent value; End Diastole (A4C): 3 27 cm  LV MOD Diam; Recent value; End Diastole (A4C): 2 9 cm  LV MOD Diam; Recent value; End Diastole (A4C): 2 34 cm  LV MOD Diam; Recent value; End Diastole (A4C): 1 65 cm  LV MOD Diam; Recent value; End Diastole (A4C): 4 92 cm  LV MOD Diam; Recent value; End Diastole (A4C): 4 73 cm  LV MOD Diam; Recent value; End Diastole (A4C): 4 38 cm  LV MOD Diam; Recent value; End Diastole (A4C): 2 11 cm  LV MOD Diam; Recent value; End Diastole (A4C): 4 94 cm  LV MOD Diam; Recent value; End Diastole (A4C): 4 79 cm  LV MOD Diam; Recent value; End Systole (A4C): 1 41 cm  LV MOD Diam; Recent value; End Systole (A4C): 2 9 cm  LV MOD Diam; Recent value; End Systole (A4C): 3 02 cm  LV MOD Diam; Recent value; End Systole (A4C): 3 11 cm  LV MOD Diam; Recent value; End Systole (A4C): 3 23 cm  LV MOD Diam; Recent value; End Systole (A4C): 3 27 cm  LV MOD Diam; Recent value; End Systole (A4C): 3 19 cm  LV MOD Diam; Recent value; End Systole (A4C): 3 03 cm  LV MOD Diam; Recent value; End Systole (A4C): 2 84 cm  LV MOD Diam; Recent value; End Systole (A4C): 2 63 cm  LV MOD Diam; Recent value; End Systole (A4C): 2 51 cm  LV MOD Diam; Recent value; End Systole (A4C): 2 44 cm  LV MOD Diam; Recent value; End Systole (A4C): 2 34 cm  LV MOD Diam; Recent value; End Systole (A4C): 2 18 cm  LV MOD Diam; Recent value; End Systole (A4C): 2 03 cm  LV MOD Diam; Recent value; End Systole (A4C): 1 86 cm  LV MOD Diam; Recent value; End Systole (A4C): 1 68 cm  LV MOD Diam; Recent value; End Systole (A4C): 1 45 cm  LV MOD Diam; Recent value; End Systole (A4C): 1 1 cm  LV MOD Diam; Recent value; End Systole (A4C): 0 75 cm  LVEF MOD A4C: 67 3 %  Left Ventricle diastolic major axis; Most recent value chosen; Method of Disks, Single Plane; 2D mode;  Apical four chamber;: 7 19 cm  Left Ventricle systolic major axis; Most recent value chosen; Method of Disks, Single Plane; 2D mode; Apical four chamber;: 6 28 cm  Left Ventricular Diastolic Area; Most recent value chosen; Method of Disks, Single Plane; 2D mode; Apical four chamber;: 2820 mm2  Left Ventricular End Diastolic Volume; Most recent value chosen; Method of Disks, Single Plane; 2D mode; Apical four chamber;: 91 2 cm3  Left Ventricular End Systolic Volume; Most recent value chosen; Method of Disks, Single Plane; 2D mode; Apical four chamber;: 29 9 cm3  Left Ventricular Systolic Area; Most recent value chosen; Method of Disks, Single Plane; 2D mode; Apical four chamber;: 1480 mm2  SV MOD A4C: 61 4 cm3    M mode  Tricuspid Annular Plane Systolic Excursion; Mean; Mean value chosen; Tricuspid Annulus; M mode;: 1 95 cm  Tricuspid Annular Plane Systolic Excursion; Tricuspid Annulus; M mode;: 1 95 cm    Tissue Doppler Imaging  LV Peak Early Garner Tissue Luis; Medial MA (TDI): 47 9 mm/s  Left Ventricular Peak Early Diastolic Tissue Velocity; Mean; Mean value chosen; Medial Mitral Annulus; Tissue Doppler Imaging;: 47 9 mm/s    Unspecified Scan Mode  [place]; Mean; Regurgitant Flow: 1940 mm/s2  [place]; Regurgitant Flow: 1680 mm/s2  [place]; Regurgitant Flow: 2190 mm/s2  PHT; Mean; Regurgitant Flow: 571 ms  PHT; Regurgitant Flow: 509 ms  PHT; Regurgitant Flow: 633 ms  Peak Grad; Mean; Regurgitant Flow: 56 mm[Hg]  Vmax; Mean; Regurgitant Flow: 3730 mm/s  Vmax; Regurgitant Flow: 3640 mm/s  Vmax; Regurgitant Flow: 3810 mm/s  MV Peak Luis/LV Peak Tissue Luis E-Wave; Medial MA: 13 2  DT; Antegrade Flow: 180 ms  DT; Mean; Antegrade Flow: 180 ms  [place]; Antegrade Flow: 3530 mm/s2  [place]; Mean; Antegrade Flow: 3530 mm/s2  MV A Luis: 826 mm/s  MV E Luis: 634 mm/s  MV E/A Ratio: 0 8  MV Peak A Luis: 826 mm/s  MV Peak E Luis; Mean; Antegrade Flow: 634 mm/s  MVA (PHT): 415 mm2  PHT: 53 ms  PHT;  Mean: 53 ms    [facility]    Prepared and electronically signed by    Cristina Rivera MD  Signed 22-Feb-2021 17:54:33       No results found for this or any previous visit  No procedure found  No results found for this or any previous visit  EKG personally reviewed by JORY See  Thank you for the opportunity to particpate in the care of this patient

## 2021-03-03 ENCOUNTER — HOSPITAL ENCOUNTER (OUTPATIENT)
Dept: CT IMAGING | Facility: HOSPITAL | Age: 76
Discharge: HOME | End: 2021-03-03
Payer: MEDICARE

## 2021-03-03 ENCOUNTER — OFFICE VISIT (OUTPATIENT)
Dept: CARDIOLOGY CLINIC | Facility: CLINIC | Age: 76
End: 2021-03-03
Payer: MEDICARE

## 2021-03-03 VITALS
BODY MASS INDEX: 23.07 KG/M2 | WEIGHT: 122.2 LBS | OXYGEN SATURATION: 97 % | HEIGHT: 61 IN | SYSTOLIC BLOOD PRESSURE: 180 MMHG | HEART RATE: 92 BPM | DIASTOLIC BLOOD PRESSURE: 60 MMHG

## 2021-03-03 DIAGNOSIS — E78.00 PURE HYPERCHOLESTEROLEMIA: ICD-10-CM

## 2021-03-03 DIAGNOSIS — I63.6 CEREBRAL INFARCTION DUE TO CEREBRAL VENOUS THROMBOSIS, NONPYOGENIC (HCC): ICD-10-CM

## 2021-03-03 DIAGNOSIS — I61.5 IVH (INTRAVENTRICULAR HEMORRHAGE) (HCC): ICD-10-CM

## 2021-03-03 DIAGNOSIS — I10 ESSENTIAL HYPERTENSION: Primary | ICD-10-CM

## 2021-03-03 DIAGNOSIS — I61.6: ICD-10-CM

## 2021-03-03 DIAGNOSIS — I62.00 SUBDURAL HEMORRHAGE (HCC): ICD-10-CM

## 2021-03-03 DIAGNOSIS — I51.7 ATRIAL DILATATION: ICD-10-CM

## 2021-03-03 PROCEDURE — 1123F ACP DISCUSS/DSCN MKR DOCD: CPT | Performed by: NURSE PRACTITIONER

## 2021-03-03 PROCEDURE — 70450 CT HEAD/BRAIN W/O DYE: CPT

## 2021-03-03 PROCEDURE — G1004 CDSM NDSC: HCPCS

## 2021-03-03 PROCEDURE — 99204 OFFICE O/P NEW MOD 45 MIN: CPT | Performed by: NURSE PRACTITIONER

## 2021-03-03 RX ORDER — ATORVASTATIN CALCIUM 40 MG/1
40 TABLET, FILM COATED ORAL DAILY
Qty: 30 TABLET | Refills: 5 | Status: SHIPPED | OUTPATIENT
Start: 2021-03-03 | End: 2021-08-17 | Stop reason: SDUPTHER

## 2021-03-03 RX ORDER — LISINOPRIL 10 MG/1
10 TABLET ORAL DAILY
Qty: 30 TABLET | Refills: 3 | Status: SHIPPED | OUTPATIENT
Start: 2021-03-03 | End: 2021-06-21 | Stop reason: SDUPTHER

## 2021-03-03 RX ORDER — AMLODIPINE BESYLATE 5 MG/1
10 TABLET ORAL DAILY
Start: 2021-03-03 | End: 2021-04-01 | Stop reason: SDUPTHER

## 2021-03-03 NOTE — PATIENT INSTRUCTIONS
Mediterranean Diet   AMBULATORY CARE:   A Mediterranean diet  is a meal plan that includes foods that are commonly eaten in countries that [place]  This meal plan may provide several health benefits  These include losing or maintaining weight, and decreasing blood pressure, blood sugar, and cholesterol levels  It may also help protect against certain health conditions such as heart disease, cancer, type 2 diabetes, and Alzheimer disease  Work with a dietitian to develop a meal plan that is right for you  Foods to include in the [address] [facility] diet:   · Include fruits and vegetables in each meal   Eat a variety of fresh fruits and vegetables  · Choose whole grains every day  These foods include whole-grain breads, pastas, and cereals  It also includes brown rice, quinoa, and millet  · Use unsaturated fats instead of saturated fats  Cook with olive or canola oil  Limit saturated fats, such as butter, margarine, and shortening  Saturated fat is an unhealthy fat that can increase your cholesterol levels  · Choose plant foods, poultry, and fish as your main sources of protein  ? Eat plant-based foods that provide protein,  such as lentils, beans, chickpeas, nuts, and seeds  Choose mostly plant-based foods in place of meat on most days of the week  ? Eat protein foods high in omega-3 fats  Fish high in omega-3 fats include salmon, trout, and tuna  Include these types of fish 1 or 2 times each week  Limit fish high in mercury, such as shark, swordfish, tilefish, and josemanuel mackerel  Omega-3 fats are also found in walnuts and flaxseed  ? Choose poultry (chicken or turkey)  without skin instead of red meat  Red meat is high in saturated fat  Limit eggs and high-fat meats, such as mendoza, sausage, and hot dogs  · Choose low-fat dairy foods  such as nonfat or 1% milk, or low-fat almond, cashew, or soy milk  Other examples include low-fat cheese, yogurt, and cottage cheese  · Limit sweets  Limit your intake of high-sugar foods, such as soda, desserts, and candy  · Talk to your healthcare provider about alcohol  Studies have shown that moderate intake of wine may reduce the risk of heart disease  A moderate amount of wine is 1 serving for women and men 65 years and older each day  Two servings is recommended for men 24to 59years of age each day  A serving of wine is 5 ounces  Other things you need to know if you follow the Mediterranean diet:   · Include foods high in iron and vitamin C   Plant-based foods that are high in iron include spinach, beans, tofu, and artichoke  Eat a serving of vitamin C with any iron-rich food to help your body absorb more iron  Examples include oranges, strawberries, cantaloupe, broccoli, and yellow peppers  · Get regular physical activity  The Mediterranean diet will have the most benefit if you get regular physical activity  Get 30 minutes of physical activity at least 5 days a week  Choose physical activities that increase your heart rate  Examples include walking, hiking, swimming, and riding a bike  Ask your healthcare provider about the best exercise plan for you  © Copyright [address] [place] [address] Drive Information is for End User's use only and may not be sold, redistributed or otherwise used for commercial purposes  All illustrations and images included in CareNotes® are the copyrighted property of [employer]  or [facility] Martha Deleon   The above information is an  only  It is not intended as medical advice for individual conditions or treatments  Talk to your doctor, nurse or pharmacist before following any medical regimen to see if it is safe and effective for you  DASH Eating Plan   WHAT YOU NEED TO KNOW:   The DASH (Dietary Approaches to Stop Hypertension) Eating Plan is designed to help prevent or lower high blood pressure  It can also help to lower LDL (bad) cholesterol and decrease your risk of heart disease   The plan is low in sodium, sugar, unhealthy fats, and total fat  It is high in potassium, calcium, magnesium, and fiber  These nutrients are added when you eat more fruits, vegetables, and whole grains  DISCHARGE INSTRUCTIONS:   Your sodium limit each day: Your dietitian will tell you how much sodium is safe for you to have each day  People with high blood pressure should have no more than 1,500 to 2,300 mg of sodium in a day  A teaspoon (tsp) of salt has 2,300 mg of sodium  This may seem like a difficult goal, but small changes to the foods you eat can make a big difference  Your healthcare provider or dietitian can help you create a meal plan that follows your sodium limit  How to limit sodium:   · Read food labels  Food labels can help you choose foods that are low in sodium  The amount of sodium is listed in milligrams (mg)  The % Daily Value (DV) column tells you how much of your daily needs are met by 1 serving of the food for each nutrient listed  Choose foods that have less than 5% of the DV of sodium  These foods are considered low in sodium  Foods that have 20% or more of the DV of sodium are considered high in sodium  Avoid foods that have more than 300 mg of sodium in each serving  Choose foods that say low-sodium, reduced-sodium, or no salt added on the food label  · Avoid salt  Do not salt food at the table, and add very little salt to foods during cooking  Use herbs and spices, such as onions, garlic, and salt-free seasonings to add flavor to foods  Try lemon or lime juice or vinegar to give foods a tart flavor  Use hot peppers or a small amount of hot pepper sauce to add a spicy flavor to foods  · Ask about salt substitutes  Ask your healthcare provider if you may use salt substitutes  Some salt substitutes have ingredients that can be harmful if you have certain health conditions  · Choose foods carefully at restaurants    Meals from restaurants, especially fast food restaurants, are often high in sodium  Some restaurants have nutrition information that tells you the amount of sodium in their foods  Ask to have your food prepared with less, or no salt  What you need to know about fats:   · Include healthy fats  Examples are unsaturated fats and omega-3 fatty acids  Unsaturated fats are found in soybean, canola, olive, or sunflower oil, and liquid and soft tub margarines  Omega-3 fatty acids are found in fatty fish, such as salmon, tuna, mackerel, and sardines  It is also found in flaxseed oil and ground flaxseed  · Avoid unhealthy fats  Do not eat unhealthy fats, such as saturated fats and trans fats  Saturated fats are found in foods that contain fat from animals  Examples are fatty meats, whole milk, butter, cream, and other dairy foods  It is also found in shortening, stick margarine, palm oil, and coconut oil  Trans fats are found in fried foods, crackers, chips, and baked goods made with margarine or shortening  Foods to include: With the DASH eating plan, you need to eat a certain number of servings from each food group  This will help you get enough of certain nutrients and limit others  The amount of servings you should eat depends on how many calories you need  Your dietitian can tell you how many calories you need  The number of servings listed next to the food groups below are for people who need about 2,000 calories each day  · Grains:  6 to 8 servings (3 of these servings should be whole-grain foods)    ? 1 slice of whole-grain bread     ? 1 ounce of dry cereal    ? ½ cup of cooked cereal, pasta, or brown rice    · Vegetables and fruits:  4 to 5 servings of fruits and 4 to 5 servings of vegetables    ? 1 medium fruit    ? ½ cup of frozen, canned (no added salt), or chopped fresh vegetables     ? ½ cup of fresh, frozen, dried, or canned fruit (canned in light syrup or fruit juice)    ? ½ cup of vegetable or fruit juice    · Dairy:  2 to 3 servings    ?  1 cup of nonfat (skim) or 1% milk    ? 1½ ounces of fat-free or low-fat cheese    ? 6 ounces of nonfat or low-fat yogurt    · Lean meat, poultry, and fish:  6 ounces or less    ? Poultry (chicken, turkey) with no skin    ? Fish (especially fatty fish, such as salmon, fresh tuna, or mackerel)    ? Lean beef and pork (loin, round, extra lean hamburger)    ? Egg whites and egg substitutes    · Nuts, seeds, and legumes:  4 to 5 servings each week    ? ½ cup of cooked beans and peas    ? 1½ ounces of unsalted nuts    ? 2 tablespoons of peanut butter or seeds    · Sweets and added sugars:  5 or less each week    ? 1 tablespoon of sugar, jelly, or jam    ? ½ cup of sorbet or gelatin    ? 1 cup of lemonade    · Fats:  2 to 3 servings each week    ? 1 teaspoon of soft margarine or vegetable oil    ? 1 tablespoon of mayonnaise    ? 2 tablespoons of salad dressing    Foods to avoid:   · Grains:      ? Baked goods, such as doughnuts, pastries, cookies, and biscuits (high in fat and sugar)    ? Mixes for cornbread and biscuits, packaged foods, such as bread stuffing, rice and pasta mixes, macaroni and cheese, and instant cereals (high in sodium)    · Fruits and vegetables:      ? Regular, canned vegetables (high in sodium)    ? Sauerkraut, pickled vegetables, and other foods prepared in brine (high in sodium)    ? Fried vegetables or vegetables in butter or high-fat sauces    ? Fruit in cream or butter sauce (high in fat)    · Dairy:      ? Whole milk, 2% milk, and cream (high in fat)    ? Regular cheese and processed cheese (high in fat and sodium)    · Meats and protein foods:      ? Smoked or cured meat, such as corned beef, mendoza, ham, hot dogs, and sausage (high in fat and sodium)    ? Canned beans and canned meats or spreads, such as potted meats, sardines, anchovies, and imitation seafood (high in sodium)    ? Deli or lunch meats, such as bologna, ham, turkey, and roast beef (high in sodium)    ?  High-fat meat (T-bone steak, regular hamburger, and ribs)    ? Whole eggs and egg yolks (high in fat)    · Other:      ? Seasonings made with salt, such as garlic salt, celery salt, onion salt, seasoned salt, meat tenderizers, and monosodium glutamate (MSG)    ? Miso soup and canned or dried soup mixes (high in sodium)    ? Regular soy sauce, barbecue sauce, teriyaki sauce, steak sauce, Worcestershire sauce, and most flavored vinegars (high in sodium)    ? Regular condiments, such as mustard, ketchup, and salad dressings (high in sodium)    ? Gravy and sauces, such as Toby or cheese sauces (high in sodium and fat)    ? Drinks high in sugar, such as soda or fruit drinks    ? Snack foods, such as salted chips, popcorn, pretzels, pork rinds, salted crackers, and salted nuts    ? Frozen foods, such as dinners, entrees, vegetables with sauces, and breaded meats (high in sodium)    Other guidelines to follow:   · Maintain a healthy weight  Your risk for heart disease is higher if you are overweight  Your healthcare provider may suggest that you lose weight if you are overweight  You can lose weight by eating fewer calories and foods that have added sugars and fat  The DASH meal plan can help you do this  Decrease calories by eating smaller portions at each meal and fewer snacks  Ask your healthcare provider for more information about how to lose weight  · Exercise regularly  Regular exercise can help you reach or maintain a healthy weight  Regular exercise can also help decrease your blood pressure and improve your cholesterol levels  Get 30 minutes or more of moderate exercise each day of the week  To lose weight, get at least 60 minutes of exercise  Talk to your healthcare provider about the best exercise program for you  · Limit alcohol  Women should limit alcohol to 1 drink a day  Men should limit alcohol to 2 drinks a day  A drink of alcohol is 12 ounces of beer, 5 ounces of wine, or 1½ ounces of liquor      © Copyright [facility] Information is for [employer] use only and may not be sold, redistributed or otherwise used for commercial purposes  All illustrations and images included in CareNotes® are the copyrighted property of [employer] M [employer]  or [employer]   The above information is an  only  It is not intended as medical advice for individual conditions or treatments  Talk to your doctor, nurse or pharmacist before following any medical regimen to see if it is safe and effective for you

## 2021-03-08 ENCOUNTER — HOSPITAL ENCOUNTER (OUTPATIENT)
Dept: NON INVASIVE DIAGNOSTICS | Facility: CLINIC | Age: 76
Discharge: HOME | End: 2021-03-08
Payer: MEDICARE

## 2021-03-08 DIAGNOSIS — E78.00 PURE HYPERCHOLESTEROLEMIA: ICD-10-CM

## 2021-03-08 DIAGNOSIS — I10 ESSENTIAL HYPERTENSION: ICD-10-CM

## 2021-03-08 DIAGNOSIS — I63.6 CEREBRAL INFARCTION DUE TO CEREBRAL VENOUS THROMBOSIS, NONPYOGENIC (HCC): ICD-10-CM

## 2021-03-08 PROCEDURE — 93880 EXTRACRANIAL BILAT STUDY: CPT | Performed by: SURGERY

## 2021-03-08 PROCEDURE — 93880 EXTRACRANIAL BILAT STUDY: CPT

## 2021-03-09 ENCOUNTER — OFFICE VISIT (OUTPATIENT)
Dept: NEUROSURGERY | Facility: CLINIC | Age: 76
End: 2021-03-09
Payer: MEDICARE

## 2021-03-09 VITALS
HEART RATE: 78 BPM | SYSTOLIC BLOOD PRESSURE: 133 MMHG | DIASTOLIC BLOOD PRESSURE: 82 MMHG | HEIGHT: 61 IN | RESPIRATION RATE: 16 BRPM | WEIGHT: 121 LBS | TEMPERATURE: 97.7 F | BODY MASS INDEX: 22.84 KG/M2

## 2021-03-09 DIAGNOSIS — R47.89 WORD FINDING DIFFICULTY: ICD-10-CM

## 2021-03-09 DIAGNOSIS — R41.3 MEMORY DIFFICULTY: ICD-10-CM

## 2021-03-09 DIAGNOSIS — G93.0 EPIDERMOID CYST OF BRAIN: Primary | ICD-10-CM

## 2021-03-09 PROCEDURE — 99214 OFFICE O/P EST MOD 30 MIN: CPT | Performed by: PHYSICIAN ASSISTANT

## 2021-03-09 RX ORDER — ACETAMINOPHEN 160 MG
2000 TABLET,DISINTEGRATING ORAL DAILY
COMMUNITY

## 2021-03-09 RX ORDER — PREDNISOLONE ACETATE 10 MG/ML
SUSPENSION/ DROPS OPHTHALMIC
COMMUNITY
Start: 2021-03-05

## 2021-03-09 NOTE — PROGRESS NOTES
Neurosurgery Office Note  Lizbeth Room 67 y o  female MRN: [id number]      Assessment/Plan     ICH (intracerebral hemorrhage) [facility])  Patient Presents today is two week hospital follow-up with repeat CT head  · Originally presented with multiple days of visual changes and 1-2 day history of headaches  Diagnosed with occipital ICH with ICH score 1, IVH and left temporal cystic lesion    Imaging:   · CT head wo 3/3/21: resolution right tentorial subdural hematoma  Grossly stable trace intraventricular hemorrhage in the temporal horns  Decreased size of right occipital lobe hemorrhage  Grossly stable cystic lesion in the left temporal lobe with adjacent hemorrhage  Plan:   · Continue monitor for any progressive or new neurological symptoms  · CT  Head imaging reviewed personally with patient and daughter in compared to prior CT head imaging  · Ongoing close monitoring of blood pressure  · Recently seen by cardiology and medications adjusted  · Plan outpatient follow-up with neurology as scheduled on 4/9/21  · Given cystic lesion in left temporal lobe, recommend MRI brain in 3 months from prior (approx 5/23/21) with outpatient follow-up  · Referral  Place for cognitive therapy given complaints of intermittent aphasia and memory difficulties  · Regarding difficulty sleeping, recommend limiting caffeine to a m  hours  Patient is safe to start melatonin as needed for sleep  · No neurosurgical intervention indicated at this time  Subdural hemorrhage (HCC)  Right tentorial SDH resolved on repeat CT head    IVH (intraventricular hemorrhage) (HCC)  Temporal horn IVH grossly stable  No evidence of hydrocephalus  Epidermoid cyst of brain  · Incidental finding during ER evaluation       Imaging:   · MRI brain 02/21/2021:  T1, T2 and diffusion hyperintense 15 mm left temporal lesion unchanged from prior CT 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      Plan:  · Repeat MRI brain 3 months from prior imaging  Diagnoses and all orders for this visit:    Epidermoid cyst of brain  -     MRI brain w wo contrast; Future    Word finding difficulty  -     Ambulatory referral to Occupational Therapy; Future    Memory difficulty  -     Ambulatory referral to Occupational Therapy; Future    Other orders  -     cholecalciferol (VITAMIN D3) 400 units tablet; Take 400 Units by mouth daily  -     prednisoLONE acetate (PRED FORTE) 1 % ophthalmic suspension; INSTILL 1 DROP IN THE RIGHT EYE EVERY OTHER DAY            CHIEF COMPLAINT    Chief Complaint   Patient presents with    Follow-up     2 week hospital f/u: intracranial hemorrhage       HISTORY    History of Present Illness     This is a 79-year-old female past medical history significant for hypertension, glaucoma, bilateral cataract surgery who presented originally to the hospital in February with visual complaints times to three days along with headaches for 1-2 days found to have cerebral hemorrhage and presents today for two week hospital follow-up  Patient is status post bilateral cataract surgery along with glaucoma of the right on time and noted to have difficulty with her region  She was prompted by her ophthalmologist to present to the emergency room for evaluation  On CT imaging, she was found to have a right occipital inter cerebral hemorrhage along with a right dural subdural hematoma and minimal IVH  In addition there was noted to be left temporal cystic lesion with adjacent hemorrhage  Patient's repeat imaging was stable and no neurosurgical intervention was recommended  She was evaluated by Neurology I discussed with possible diagnosis of amyloid  She had a Holter monitor placed February 23, 2021 is awaiting results  Patient has followed with cardiology for adjustment of her blood pressure medication in addition to her cholesterol medications        Today, patient   Does note some difficulty with memory and word-finding difficulties  In addition she has difficulty sleeping  She denies any visual disturbance  Denies any headaches, nausea vomiting, weakness or numbness  Of note patient was seen last Friday by Ophthalmology and found to have a right eye infection  Patient was started on  Antibiotic bacterial eyedrops and had resolution of her eye pain and headache  REVIEW OF SYSTEMS    Review of Systems   Constitutional: Negative  HENT: Negative for hearing loss  Eyes: Negative for visual disturbance  Respiratory: Negative for shortness of breath and wheezing  Cardiovascular: Negative for chest pain  Gastrointestinal: Negative  Negative for nausea and vomiting  Endocrine: Negative  Genitourinary: Negative  Musculoskeletal: Negative for gait problem  Skin: Negative  Allergic/Immunologic: Negative  Neurological: Positive for speech difficulty (word finding)  Negative for dizziness, seizures, weakness, numbness and headaches  Hematological: Does not bruise/bleed easily  Psychiatric/Behavioral: Positive for sleep disturbance  Meds/Allergies     Current Outpatient Medications   Medication Sig Dispense Refill    amLODIPine (NORVASC) 5 mg tablet Take 2 tablets (10 mg total) by mouth daily      atorvastatin (LIPITOR) 40 mg tablet Take 1 tablet (40 mg total) by mouth daily 30 tablet 5    lisinopril (ZESTRIL) 10 mg tablet Take 1 tablet (10 mg total) by mouth daily 30 tablet 3    prednisoLONE acetate (PRED FORTE) 1 % ophthalmic suspension INSTILL 1 DROP IN THE RIGHT EYE EVERY OTHER DAY      cholecalciferol (VITAMIN D3) 400 units tablet Take 400 Units by mouth daily       No current facility-administered medications for this visit          Allergies   Allergen Reactions    Codeine     Sulfa Antibiotics        PAST HISTORY    Past Medical History:   Diagnosis Date    Glaucoma     High blood pressure 11/30/2018    Panic attacks 1998    Papanicolaou smear 03/17/2016 NEG    Post-menopausal        Past Surgical History:   Procedure Laterality Date    COLONOSCOPY  2010    MAMMO (HISTORICAL) Bilateral 01/21/2019    No evidence of malignancy    TONSILLECTOMY      VAGINAL DELIVERY      X4       Social History     Tobacco Use    Smoking status: Never Smoker    Smokeless tobacco: Never Used   Substance Use Topics    Alcohol use: Yes     Comment: social    Drug use: No       Family History   Problem Relation Age of Onset    Heart disease Sister          Above history personally reviewed  EXAM    Vitals:Blood pressure 133/82, pulse 78, temperature 97 7 °F (36 5 °C), temperature source Tympanic, resp  rate 16, height 5' 1" (1 549 m), weight 54 9 kg (121 lb), not currently breastfeeding  ,Body mass index is 22 86 kg/m²  Physical Exam  Constitutional:       General: She is not in acute distress  Appearance: Normal appearance  She is well-developed  She is not ill-appearing  HENT:      Head: Normocephalic and atraumatic  Nose: Nose normal    Eyes:      General: No scleral icterus  Right eye: No discharge  Left eye: No discharge  Extraocular Movements: Extraocular movements intact and EOM normal       Conjunctiva/sclera: Conjunctivae normal    Cardiovascular:      Rate and Rhythm: Normal rate  Pulmonary:      Effort: Pulmonary effort is normal    Abdominal:      General: There is no distension  Musculoskeletal:         General: No deformity  Skin:     General: Skin is warm and dry  Findings: No rash  Neurological:      Mental Status: She is alert  Coordination: Finger-Nose-Finger Test abnormal (right)  Deep Tendon Reflexes: Strength normal       Reflex Scores:       Bicep reflexes are 2+ on the right side and 2+ on the left side  Brachioradialis reflexes are 2+ on the right side and 2+ on the left side  Patellar reflexes are 2+ on the right side and 2+ on the left side    Psychiatric:         Mood and Affect: Mood normal          Speech: Speech normal          Behavior: Behavior normal          Thought Content: Thought content normal          Judgment: Judgment normal          Neurologic Exam     Mental Status   Follows 3 step commands  Attention: normal  Concentration: normal    Speech: speech is normal   Level of consciousness: alert  Knowledge: good  Able to perform simple calculations  Normal comprehension  Cranial Nerves     CN II   Visual fields full to confrontation  CN III, IV, VI   Extraocular motions are normal    Right pupil: Shape: irregular  Reactivity: brisk  Left pupil: Shape: irregular  Reactivity: brisk  Nystagmus: none   Upgaze: normal  Conjugate gaze: present    CN V   Facial sensation intact  CN VII   Facial expression full, symmetric  CN VIII   Hearing: intact    CN XI   Right trapezius strength: normal  Left trapezius strength: normal    CN XII   Tongue: not atrophic  Fasciculations: absent  Tongue deviation: none    Motor Exam   Muscle bulk: normal  Overall muscle tone: normal  Right arm pronator drift: absent  Left arm pronator drift: absent    Strength   Strength 5/5 throughout       Sensory Exam   Light touch normal      Gait, Coordination, and Reflexes     Coordination   Finger to nose coordination: abnormal (right)    Tremor   Resting tremor: absent  Intention tremor: absent  Action tremor: absent    Reflexes   Right brachioradialis: 2+  Left brachioradialis: 2+  Right biceps: 2+  Left biceps: 2+  Right patellar: 2+  Left patellar: 2+  Right Purvis: absent  Left Purvis: absent  Right ankle clonus: absent  Left ankle clonus: absent        MEDICAL DECISION MAKING    Imaging Studies:     Cta Head And Neck W Wo Contrast    Result Date: 2/22/2021  Narrative: CTA NECK AND BRAIN WITH AND WITHOUT CONTRAST INDICATION: Parenchymal hemorrhage, follow-up IPH, R/o vascular etiology COMPARISON:   February 21, 2021 TECHNIQUE:  Routine CT imaging of the Brain without contrast   Post contrast imaging was performed after administration of iodinated contrast through the neck and brain  Post contrast axial 0 625 mm images timed to opacify the arterial system  3D rendering was performed on an independent workstation  MIP reconstructions performed  Coronal reconstructions were performed of the noncontrast portion of the brain  Radiation dose length product (DLP) for this visit:  1250 82 mGy-cm   This examination, like all CT scans performed in the [facility], was performed utilizing techniques to minimize radiation dose exposure, including the use of iterative reconstruction and automated exposure control  IV Contrast:  85 mL of iohexol (OMNIPAQUE)  IMAGE QUALITY:   Diagnostic FINDINGS: NONCONTRAST BRAIN PARENCHYMA:  Unchanged areas of right occipital lobe hemorrhage, intraventricular hemorrhage and left temporal lobe cystic area /lesion  Please refer to recent MRI from yesterday  Unchanged subdural hemorrhage along the right cerebellar falx  VENTRICLES AND EXTRA-AXIAL SPACES:  Unchanged VISUALIZED ORBITS AND PARANASAL SINUSES:  Unremarkable  CERVICAL VASCULATURE AORTIC ARCH AND GREAT VESSELS:  Moderate atherosclerotic disease of the arch and great vessels  RIGHT VERTEBRAL ARTERY CERVICAL SEGMENT:  Normal origin  The vessel is normal in caliber throughout the neck  LEFT VERTEBRAL ARTERY CERVICAL SEGMENT:  Normal origin  The vessel is normal in caliber throughout the neck  RIGHT EXTRACRANIAL CAROTID SEGMENT:  Moderate atherosclerotic disease of the bifurcation  LEFT EXTRACRANIAL CAROTID SEGMENT:  Moderate atherosclerotic disease of the bifurcation  NASCET criteria was used to determine the degree of internal carotid artery diameter stenosis  INTRACRANIAL VASCULATURE INTERNAL CAROTID ARTERIES:  Normal enhancement of the intracranial portions of the internal carotid arteries  Normal ophthalmic artery origins  Normal ICA terminus   ANTERIOR CIRCULATION:  Symmetric A1 segments and anterior cerebral arteries with normal enhancement  Normal anterior communicating artery  MIDDLE CEREBRAL ARTERY CIRCULATION:  M1 segment and middle cerebral artery branches demonstrate normal enhancement bilaterally  DISTAL VERTEBRAL ARTERIES:  Normal distal vertebral arteries  Posterior inferior cerebellar artery origins are normal  Normal vertebral basilar junction  BASILAR ARTERY:  Basilar artery is normal in caliber  Normal superior cerebellar arteries  POSTERIOR CEREBRAL ARTERIES: Both posterior cerebral arteries arises from the basilar tip  Both arteries demonstrate normal enhancement  Normal posterior communicating arteries  DURAL VENOUS SINUSES:  Normal  NON VASCULAR ANATOMY BONY STRUCTURES:  No acute osseous abnormality  SOFT TISSUES OF THE NECK:  Normal  THORACIC INLET:  Unremarkable  Impression: Unchanged areas of right occipital lobe hemorrhage, intraventricular hemorrhage and left temporal lobe cystic area /lesion  Please refer to recent MRI from yesterday  Unchanged subdural hemorrhage along the right cerebellar falx  Follow-up unenhanced head CT recommended in 24 hours  No evidence of hemodynamic significant stenosis, aneurysm or dissection  Workstation performed: [id number]     Ct Head Wo Contrast    Result Date: 3/3/2021  Narrative: CT BRAIN - WITHOUT CONTRAST INDICATION:   I62 00: Nontraumatic subdural hemorrhage, unspecified I61 6: Nontraumatic intracerebral hemorrhage, multiple localized I61 5: Nontraumatic intracerebral hemorrhage, intraventricular  2/22/2021 COMPARISON:  2/22/2021, 2/21/2021 TECHNIQUE:  CT examination of the brain was performed  In addition to axial images, sagittal and coronal 2D reformatted images were created and submitted for interpretation  Radiation dose length product (DLP) for this visit:  822 mGy-cm     This examination, like all CT scans performed in the [facility], was performed utilizing techniques to minimize radiation dose exposure, including the use of iterative reconstruction and automated exposure control  IMAGE QUALITY:  Diagnostic  FINDINGS: PARENCHYMA: Hemorrhage with surrounding vasogenic edema within the right occipital lobe is again seen though with decrease in size since the prior study  1 4 x 1 0 cm cystic lesion with associated hemorrhage within the left temporal lobe is not significantly changed  No mass effect or midline shift  No CT signs of acute infarction  Periventricular white matter hypoattenuation is unchanged and consistent with chronic microangiopathic change  VENTRICLES AND EXTRA-AXIAL SPACES:  Trace hemorrhage remains within the temporal horns of the lateral ventricles  Subdural hemorrhage along the right tentorium has resolved  VISUALIZED ORBITS AND PARANASAL SINUSES:  Unremarkable  CALVARIUM AND EXTRACRANIAL SOFT TISSUES:  Normal      Impression: 1  Resolution of subdural hematoma along the right tentorium  No significant change in trace intraventricular hemorrhage within the temporal horns of the lateral ventricles  2   Decrease in size of right occipital lobe hemorrhage  3   Stable cystic lesion within the left temporal lobe with adjacent focus of hemorrhage  Workstation performed: [id number]     Ct Head Without Contrast    Result Date: 2/21/2021  Narrative: CT BRAIN - WITHOUT CONTRAST INDICATION:   Headache, acute, normal neuro exam Headache, visual changes  COMPARISON:  None  TECHNIQUE:  CT examination of the brain was performed  In addition to axial images, sagittal and coronal 2D reformatted images were created and submitted for interpretation  Radiation dose length product (DLP) for this visit:  866 mGy-cm   This examination, like all CT scans performed in the [facility], was performed utilizing techniques to minimize radiation dose exposure, including the use of iterative reconstruction and automated exposure control  IMAGE QUALITY:  Diagnostic   FINDINGS: PARENCHYMA: Decreased attenuation is noted in periventricular and subcortical white matter demonstrating an appearance that is statistically most likely to represent mild microangiopathic change  Focal hypodensity in the left temporal lobe measuring 1 5  cm  No CT signs of acute infarction  No intracranial mass, mass effect or midline shift  Acute intraventricular hemorrhage and parenchymal hemorrhage in the occipital horn of the right lateral ventricle and right occipital lobe  Subdural blood along the right tentorium  VENTRICLES AND EXTRA-AXIAL SPACES:  Normal for the patient's age  VISUALIZED ORBITS AND PARANASAL SINUSES:  Unremarkable  CALVARIUM AND EXTRACRANIAL SOFT TISSUES:  Normal      Impression: Right occipital lobe hemorrhage and intraventricular hemorrhage in the occipital horn  Subdural blood along the right tentorium  Additional hypodensity in the left temporal lobe  Given no history of trauma, findings likely represent either hemorrhagic  metastases or stroke  Recommend MRI with contrast   I personally discussed this study with Brianan Rudolph on 2/21/2021 at 1:19 PM   Workstation performed: [id number]     Mri Brain W Wo Contrast    Result Date: 2/21/2021  Narrative: MRI BRAIN WITH AND WITHOUT CONTRAST INDICATION: Occipital infarct, NSU  COMPARISON:  None  TECHNIQUE: Sagittal T1, axial T2, axial FLAIR, axial T1, axial [place], axial diffusion  Sagittal, axial T1 postcontrast   Axial bravo postcontrast with coronal reconstructions  IV Contrast:  5 mL of Gadobutrol injection (SINGLE-DOSE)  IMAGE QUALITY:   Diagnostic  FINDINGS: BRAIN PARENCHYMA:  Unchanged right occipital intraparenchymal hemorrhage measuring 2 4 x 1 4 x 1 6 cm  No underlying mass or area of enhancement is not apparent  Advanced periventricular white matter changes in keeping with chronic small vessel ischemic disease    T1, T2 and diffusion hyperintense 15 mm left temporal lesion is unchanged from the recent CT and 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  Postcontrast imaging of the brain demonstrates no abnormal enhancement  VENTRICLES:  No hydrocephalus  Small amount of hemorrhage is likely present in the occipital horn of the right lateral ventricle  SELLA AND PITUITARY GLAND:  Normal  ORBITS:  Normal  PARANASAL SINUSES:  Normal  VASCULATURE:  Evaluation of the major intracranial vasculature demonstrates appropriate flow voids  CALVARIUM AND SKULL BASE:  Normal  EXTRACRANIAL SOFT TISSUES:  Normal      Impression: Unchanged 2 4 x 1 4 x 1 6 cm right occipital hemorrhage  No apparent underlying mass or enhancement  T1, T2 and diffusion hyperintense 15 mm left temporal lesion is unchanged from the recent CT and 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  The study was marked in EPIC for immediate notification  Workstation performed: [id number]       I have personally reviewed pertinent reports     and I have personally reviewed pertinent films in PACS

## 2021-03-09 NOTE — ASSESSMENT & PLAN NOTE
Patient Presents today is two week hospital follow-up with repeat CT head  · Originally presented with multiple days of visual changes and 1-2 day history of headaches  Diagnosed with occipital ICH with ICH score 1, IVH and left temporal cystic lesion    Imaging:   · CT head wo 3/3/21: resolution right tentorial subdural hematoma  Grossly stable trace intraventricular hemorrhage in the temporal horns  Decreased size of right occipital lobe hemorrhage  Grossly stable cystic lesion in the left temporal lobe with adjacent hemorrhage  Plan:   · Continue monitor for any progressive or new neurological symptoms  · CT  Head imaging reviewed personally with patient and daughter in compared to prior CT head imaging  · Ongoing close monitoring of blood pressure  · Recently seen by cardiology and medications adjusted  · Plan outpatient follow-up with neurology as scheduled on 4/9/21  · Given cystic lesion in left temporal lobe, recommend MRI brain in 3 months from prior (approx 5/23/21) with outpatient follow-up  · Referral  Place for cognitive therapy given complaints of intermittent aphasia and memory difficulties  · Regarding difficulty sleeping, recommend limiting caffeine to a m  hours  Patient is safe to start melatonin as needed for sleep  · No neurosurgical intervention indicated at this time

## 2021-03-09 NOTE — ASSESSMENT & PLAN NOTE
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

## 2021-03-12 ENCOUNTER — PATIENT OUTREACH (OUTPATIENT)
Dept: CASE MANAGEMENT | Facility: OTHER | Age: 76
End: 2021-03-12

## 2021-03-12 NOTE — PROGRESS NOTES
Outreach TC to patient for CM assessment  Patient answered the phone and reported that she was feeling well  Patient does report that she sometimes feels a little depressed because she still has some word finding difficulty  Patient will be looking into speech therapy to help with this  Patient did complete a CT scan of her head on 3/3/21 which did reveal that the hemorrhage in the R occipital lobe is decreased in size  Patient also found to have cystic lesion and will have an MRI in May  The subdural hematoma has completely resolved  Patient saw neurology on 3/9/21  Patient saw cardiology on 3/3/21 and some medication chnages were made  Patient was to stop her simvastatin and start atorvastatin 40 mg po daily  Patient was also to start lisinopril 10 mg daily and amlodipine 10 mg daily  Patient has started all the new medications  Patient has noticied a dry cough that is interrmittant  This CM did suggest that patient discuss with cardiology  Reviewed home medications, s/sx to report, future appointments and how/when to report symptoms to provider vs 911  Patient verbalizes understanding and agrees to additional calls

## 2021-03-15 ENCOUNTER — APPOINTMENT (OUTPATIENT)
Dept: LAB | Facility: CLINIC | Age: 76
End: 2021-03-15
Payer: MEDICARE

## 2021-03-15 DIAGNOSIS — I10 ESSENTIAL HYPERTENSION: ICD-10-CM

## 2021-03-15 LAB
ANION GAP SERPL CALCULATED.3IONS-SCNC: 8 MMOL/L (ref 4–13)
BUN SERPL-MCNC: 16 MG/DL (ref 5–25)
CALCIUM SERPL-MCNC: 9.9 MG/DL (ref 8.3–10.1)
CHLORIDE SERPL-SCNC: 104 MMOL/L (ref 100–108)
CO2 SERPL-SCNC: 28 MMOL/L (ref 21–32)
CREAT SERPL-MCNC: 0.96 MG/DL (ref 0.6–1.3)
GFR SERPL CREATININE-BSD FRML MDRD: 58 ML/MIN/1.73SQ M
GLUCOSE SERPL-MCNC: 98 MG/DL (ref 65–140)
POTASSIUM SERPL-SCNC: 4.5 MMOL/L (ref 3.5–5.3)
SODIUM SERPL-SCNC: 140 MMOL/L (ref 136–145)

## 2021-03-15 PROCEDURE — 80048 BASIC METABOLIC PNL TOTAL CA: CPT

## 2021-03-15 PROCEDURE — 36415 COLL VENOUS BLD VENIPUNCTURE: CPT

## 2021-03-16 ENCOUNTER — TELEPHONE (OUTPATIENT)
Dept: NEUROLOGY | Facility: CLINIC | Age: 76
End: 2021-03-16

## 2021-03-16 NOTE — TELEPHONE ENCOUNTER
21-30 Day Post CVA Discharge Follow Up    Hospitalization: 2/21-2/23  The purpose of this phone call is to assess patient's general wellbeing or for any assistance needed with follow-up care  Called, reached patient, since discharge, she denies experiencing any new or worsening stroke-like symptoms  Patient denies the presence of any residual stroke symptoms following hospitalization and claims she has returned to baseline functioning  States she is sleeping better and all pain has resolved  Ambulation / ADLs:  she is ambulating independently as well as preforming her own ADLs  Patient manages her own medications, appointments, and affairs  Appointments / Medication Review:  Reviewed appointments - patient successfully followed up with PCP and neurosurgery  Scheduled with Dr Elsa Jc 4/9  I reviewed medications with her  There have not been any medication changes since discharge from the hospital  Reports having no difficulties obtaining medications  Reports she is taking all as prescribed with no missed doses, medication side effects, or signs of bleeding  Risk Factors / Education:  Patient stroke type, symptoms, personal risk factors and management, medications, and resources  As for risk factors, patient reports they have been managing modifiable risk factors in the the following ways:   BP is monitored at home, Reported average BP continues to be 125-140/60    she is a non smoker  she reports following a low salt diet  I addressed all her questions  At the conclusion of the conversation, patient denies having any further questions or concerns

## 2021-03-18 ENCOUNTER — CLINICAL SUPPORT (OUTPATIENT)
Dept: CARDIOLOGY CLINIC | Facility: CLINIC | Age: 76
End: 2021-03-18
Payer: MEDICARE

## 2021-03-18 DIAGNOSIS — I61.6: ICD-10-CM

## 2021-03-18 DIAGNOSIS — I51.7 ATRIAL DILATION: ICD-10-CM

## 2021-03-18 PROCEDURE — 93228 REMOTE 30 DAY ECG REV/REPORT: CPT | Performed by: INTERNAL MEDICINE

## 2021-03-31 ENCOUNTER — CLINICAL SUPPORT (OUTPATIENT)
Dept: CARDIOLOGY CLINIC | Facility: CLINIC | Age: 76
End: 2021-03-31

## 2021-03-31 DIAGNOSIS — I61.6: Primary | ICD-10-CM

## 2021-03-31 PROCEDURE — RECHECK

## 2021-04-01 DIAGNOSIS — I10 ESSENTIAL HYPERTENSION: ICD-10-CM

## 2021-04-01 RX ORDER — AMLODIPINE BESYLATE 10 MG/1
10 TABLET ORAL DAILY
Qty: 90 TABLET | Refills: 3 | Status: SHIPPED | OUTPATIENT
Start: 2021-04-01 | End: 2021-10-28

## 2021-04-02 ENCOUNTER — APPOINTMENT (OUTPATIENT)
Dept: LAB | Facility: CLINIC | Age: 76
End: 2021-04-02
Payer: MEDICARE

## 2021-04-02 DIAGNOSIS — E78.00 PURE HYPERCHOLESTEROLEMIA: ICD-10-CM

## 2021-04-02 LAB
CHOLEST SERPL-MCNC: 186 MG/DL (ref 50–200)
HDLC SERPL-MCNC: 83 MG/DL
LDLC SERPL CALC-MCNC: 86 MG/DL (ref 0–100)
NONHDLC SERPL-MCNC: 103 MG/DL
TRIGL SERPL-MCNC: 84 MG/DL

## 2021-04-02 PROCEDURE — 80061 LIPID PANEL: CPT

## 2021-04-02 PROCEDURE — 36415 COLL VENOUS BLD VENIPUNCTURE: CPT

## 2021-04-09 ENCOUNTER — OFFICE VISIT (OUTPATIENT)
Dept: NEUROLOGY | Facility: CLINIC | Age: 76
End: 2021-04-09
Payer: MEDICARE

## 2021-04-09 ENCOUNTER — PATIENT OUTREACH (OUTPATIENT)
Dept: CASE MANAGEMENT | Facility: OTHER | Age: 76
End: 2021-04-09

## 2021-04-09 VITALS
SYSTOLIC BLOOD PRESSURE: 130 MMHG | WEIGHT: 118 LBS | DIASTOLIC BLOOD PRESSURE: 76 MMHG | HEART RATE: 66 BPM | BODY MASS INDEX: 22.28 KG/M2 | HEIGHT: 61 IN

## 2021-04-09 DIAGNOSIS — I61.0 NONTRAUMATIC SUBCORTICAL HEMORRHAGE OF RIGHT CEREBRAL HEMISPHERE (HCC): Primary | ICD-10-CM

## 2021-04-09 PROCEDURE — 99213 OFFICE O/P EST LOW 20 MIN: CPT | Performed by: PSYCHIATRY & NEUROLOGY

## 2021-04-09 NOTE — PROGRESS NOTES
Patient ID: Cliff Herr is a 67 y o  female  Assessment/Plan:   spontaneous right intra cerebral hemorrhage , occipital lobe with intracranial hemorrhage score of 1  There has been complete resolution of symptoms which were predominantly some visual distortions in the  Left leonardo fields  MRI did not show evidence of amyloid angiopathy but the patient has had fluctuating hypertension  Incidental finding of a right temporal cystic lesion which is being followed closely by Neurosurgery     20 minutes was spent with the patient today more than half of which was spent in care coordination and counseling over strict hypertension control  I reviewed with her the principal warning signs of stroke and to immediately present to the emergency department or activate 911  Follow-up in 6 months        Subjective:       the patient now has no complaints of any further visual obscurations  Initially there was some visual distortion in the left leonardo fields which is now completely resolved  She has no headaches or any focal or lateralizing neurologic complaints    HPI  Patient presents to the office today following recent hospitalization for ICH  Since discharge, she denies experiencing any new or worsening stroke-like symptoms  Patient denies the presence of any residual symptoms following hospitalization and claims she has returned to baseline functioning  Ambulation / ADLs:  Patient claims she is ambulating independently as well as preforming her own ADLs  Patient manages her own medications, appointments, and affairs  Appointments / Medication Review:  Patient successfully followed up with neurosurgery, cardiology  She is scheduled with her PCP next week  I reviewed medications with her  Patient reports having no difficulties obtaining medications  Reports she is taking all as prescribed with no missed doses, medication side effects      Risk Factors / Education:  We reviewed stroke type, symptoms, personal risk factors and management, medications, and resources  Patient verbalizes understanding of teaching  Offered stroke education binder and my card to patient  she has been managing personal stroke risk factors and reports the following: she is non smoker, she reports following a low salt diet  Patient reportsshe monitors her BP at home  Reported average BP continues to be 130-119  Reports she has been exercising regularly about 3-4 times a week  I addressed all her questions  At the conclusion of the conversation, patient denies having any further questions or concerns  Objective:    not currently breastfeeding  Physical Exam    Neurological Exam    Patient walks without limp list antalgic quality  Motor strength is 5/5 throughout  Pupils are equal and reactive at 4 mm  Extraocular movements are full and visual fields are fully intact    ROS:    Review of Systems   Constitutional: Negative  Negative for appetite change and fever  HENT: Negative  Negative for hearing loss, tinnitus, trouble swallowing and voice change  Eyes: Negative  Negative for photophobia and pain  Respiratory: Negative  Negative for shortness of breath  Cardiovascular: Negative  Negative for palpitations  Gastrointestinal: Negative  Negative for nausea and vomiting  Endocrine: Negative  Negative for cold intolerance  Genitourinary: Negative  Negative for dysuria, frequency and urgency  Musculoskeletal: Negative  Negative for myalgias and neck pain  Skin: Negative  Negative for rash  Neurological: Negative  Negative for dizziness, tremors, seizures, syncope, facial asymmetry, speech difficulty, weakness, light-headedness, numbness and headaches  Hematological: Negative  Does not bruise/bleed easily  Psychiatric/Behavioral: Negative  Negative for confusion, hallucinations and sleep disturbance

## 2021-04-09 NOTE — PROGRESS NOTES
Outreach TC to patient's home  No answer to call and no voicemail so unable to leave a message  This CM will reschedule call for another day

## 2021-04-13 ENCOUNTER — TELEPHONE (OUTPATIENT)
Dept: NEUROLOGY | Facility: CLINIC | Age: 76
End: 2021-04-13

## 2021-04-14 PROBLEM — R73.01 IFG (IMPAIRED FASTING GLUCOSE): Status: ACTIVE | Noted: 2021-04-14

## 2021-04-14 PROBLEM — M17.9 OSTEOARTHRITIS OF KNEE: Status: ACTIVE | Noted: 2021-04-14

## 2021-04-14 PROBLEM — M17.10 OSTEOARTHRITIS OF KNEE: Status: ACTIVE | Noted: 2021-04-14

## 2021-04-14 PROBLEM — E78.2 MIXED HYPERLIPIDEMIA: Status: ACTIVE | Noted: 2021-04-14

## 2021-04-14 PROBLEM — I61.5 IVH (INTRAVENTRICULAR HEMORRHAGE) (HCC): Status: RESOLVED | Noted: 2021-02-22 | Resolved: 2021-04-14

## 2021-04-14 PROBLEM — I10 HYPERTENSION: Status: RESOLVED | Noted: 2021-02-21 | Resolved: 2021-04-14

## 2021-04-14 PROBLEM — Z86.79 HISTORY OF INTRACEREBRAL HEMORRHAGE WITHOUT RESIDUAL DEFICIT: Status: ACTIVE | Noted: 2021-02-22

## 2021-04-14 PROBLEM — E55.9 VITAMIN D DEFICIENCY: Status: ACTIVE | Noted: 2021-04-14

## 2021-04-14 PROBLEM — I65.22 STENOSIS OF LEFT CAROTID ARTERY: Status: ACTIVE | Noted: 2021-04-14

## 2021-04-14 PROBLEM — G93.9 TEMPORAL LOBE LESION: Status: ACTIVE | Noted: 2021-04-14

## 2021-04-14 PROBLEM — Z86.79 HISTORY OF SUBDURAL HEMORRHAGE: Status: ACTIVE | Noted: 2021-02-21

## 2021-04-14 PROBLEM — I10 BENIGN ESSENTIAL HYPERTENSION: Status: ACTIVE | Noted: 2021-04-14

## 2021-04-14 PROBLEM — I65.23 BILATERAL CAROTID ARTERY STENOSIS: Status: ACTIVE | Noted: 2021-04-14

## 2021-04-14 NOTE — PROGRESS NOTES
Assessment/Plan:  1  Encounter for Medicare annual wellness exam  See other note     2  Vitamin D deficiency  Continue over the counter 2000 iu daily     3  IFG (impaired fasting glucose)  Well controlled with normal HbA1C now  4  Mixed hyperlipidemia  Well controlled on Lipitor 40 mg - LDL 83 but HDL 83     5  Benign essential hypertension  Well controlled     6  History of intracerebral hemorrhage without residual deficit  Some word finding difficulty  Already has referral to OT for this from neuro  7  History of subdural hemorrhage  See above     8  Epidermoid cyst of brain  Being followed     9  Weight loss  Update TSH  Encourage small, frequent high protein meals/snacks    - TSH, 3rd generation with Free T4 reflex; Future    Return in 1 year (on 4/15/2022) for Medicare Wellness Visit  Subjective:   Italo Vaughn is a 67 y o  female here today for a follow-up on her current medical conditions:  Patient Active Problem List   Diagnosis    History of subdural hemorrhage    Headache    History of intracerebral hemorrhage without residual deficit    Epidermoid cyst of brain    Bilateral carotid artery stenosis    Temporal lobe lesion    Benign essential hypertension    Glaucoma    Mixed hyperlipidemia    Osteoarthritis of knee    Vitamin D deficiency    IFG (impaired fasting glucose)        Current Outpatient Medications   Medication Sig Dispense Refill    amLODIPine (NORVASC) 10 mg tablet Take 1 tablet (10 mg total) by mouth daily 90 tablet 3    atorvastatin (LIPITOR) 40 mg tablet Take 1 tablet (40 mg total) by mouth daily 30 tablet 5    Cholecalciferol (Vitamin D3) 50 MCG (2000 UT) capsule Take 2,000 Units by mouth daily       lisinopril (ZESTRIL) 10 mg tablet Take 1 tablet (10 mg total) by mouth daily 30 tablet 3    prednisoLONE acetate (PRED FORTE) 1 % ophthalmic suspension INSTILL 1 DROP IN THE RIGHT EYE EVERY OTHER DAY       No current facility-administered medications for this visit  HPI:  Chief Complaint   Patient presents with    New Patient Visit     Est care - was admitted at the hospital last month due to stroke   [facility] Wellness Visit     -- Above per clinical staff and reviewed  --    PHQ-9 Depression Screening    PHQ-9:   Frequency of the following problems over the past two weeks:      Little interest or pleasure in doing things: 0 - not at all  Feeling down, depressed, or hopeless: 1 - several days  Trouble falling or staying asleep, or sleeping too much: 1 - several days  Feeling tired or having little energy: 0 - not at all  Poor appetite or overeatin - not at all  Feeling bad about yourself - or that you are a failure or have let yourself or your family down: 0 - not at all  Trouble concentrating on things, such as reading the newspaper or watching television: 0 - not at all  Moving or speaking so slowly that other people could have noticed  Or the opposite - being so fidgety or restless that you have been moving around a lot more than usual: 0 - not at all  Thoughts that you would be better off dead, or of hurting yourself in some way: 0 - not at all  PHQ-2 Score: 1  PHQ-9 Score: 2       AAN M-7 Flowsheet Screening      Most Recent Value   Over the last two weeks, how often have you been bothered by the following problems? Feeling nervous, anxious, or on edge  0   Not being able to stop or control worrying  0   Worrying too much about different things  0   Trouble relaxing   0   Being so restless that it's hard to sit still  0   Becoming easily annoyed or irritable   0   Feeling afraid as if something awful might happen  0   How difficult have these problems made it for you to do your work, take care of things at home, or get along with other people? Not difficult at all   ANA M Score   0          New pt - shared medical records reviewed  Labs  lipids 186, LDL 86, HDL 83  3/21 Cr 0 96, GFR 58   2021 HbA1C 5 5   vit D 2020 37   follows with neuro Dr Maria Esther Napoles , neurosurg Biundo/Shanae , cardio Phillip Fountain    March hemorrhagic stroke  suspect due   to BP  started on lsinopril 10 (?cough, norvasc 10 and switched from zocor to lipitor 40  BP cuff calibrated and accurate   'to have f u MRI end of may to f/u temporal lesion  melatonin for sleep  Today:  Before all this high blood pressure  Cataracts and glaucoma - to see new doctor soon - [address]  From Kelly Alvarez at [facility] Dr Adeel Dawson now  Fish  Going to gym 1 - 3 times    Chucky Cline  4 kids - one in [facility]  3 grandchildren  One close by - two in [place] area  Used to do clerical work at [employer]  Declines dexa scan   Heel scan little low in the past     She has advanced directive at home    sometiems some word finding difficulty   Comes to her eventually     Has lost some weight   Has living will at home  Will bring it in  DNR if no hope  The following portions of the patient's history were reviewed and updated as appropriate: allergies, current medications, past family history, past medical history, past social history, past surgical history and problem list     Objective:  Vitals:  /60   Pulse 86   Temp (!) 97 3 °F (36 3 °C)   Resp 18   Ht 4' 11 8" (1 519 m)   Wt 53 6 kg (118 lb 3 2 oz)   SpO2 98%   BMI 23 24 kg/m²    Wt Readings from Last 3 Encounters:   04/15/21 53 6 kg (118 lb 3 2 oz)   04/09/21 53 5 kg (118 lb)   03/09/21 54 9 kg (121 lb)      BP Readings from Last 3 Encounters:   04/15/21 134/60   04/09/21 130/76   03/09/21 133/82        Review of Systems   She has no other concerns  No unexpected weight changes  No chest pain, SOB, or palpitations  No GERD  No changes in bowels or bladder  Sleeping well  No mood changes  Physical Exam   Constitutional:  she appears well-developed and well-nourished  HENT: Head: Normocephalic  Neck: Neck supple  Cardiovascular: Normal rate, regular rhythm and normal heart sounds     Pulmonary/Chest: Effort normal and breath sounds normal  No wheezes, rales, or rhonchi  Abdominal: Soft  Bowel sounds are normal  There is no tenderness  No hepatosplenomegaly  Musculoskeletal: she exhibits no edema  Lymphadenopathy: she has no cervical adenopathy  Neurological: she is alert and oriented to person, place, and time  (no word finding difficulty during our exam  Well spoken)   Skin: Skin is warm and dry  Psychiatric: she has a normal mood and affect   her behavior is normal  Thought content normal

## 2021-04-15 ENCOUNTER — TRANSCRIBE ORDERS (OUTPATIENT)
Dept: LAB | Facility: CLINIC | Age: 76
End: 2021-04-15

## 2021-04-15 ENCOUNTER — LAB (OUTPATIENT)
Dept: LAB | Facility: CLINIC | Age: 76
End: 2021-04-15
Payer: MEDICARE

## 2021-04-15 ENCOUNTER — OFFICE VISIT (OUTPATIENT)
Dept: FAMILY MEDICINE CLINIC | Facility: CLINIC | Age: 76
End: 2021-04-15
Payer: MEDICARE

## 2021-04-15 VITALS
DIASTOLIC BLOOD PRESSURE: 60 MMHG | RESPIRATION RATE: 18 BRPM | BODY MASS INDEX: 23.2 KG/M2 | HEIGHT: 60 IN | SYSTOLIC BLOOD PRESSURE: 134 MMHG | OXYGEN SATURATION: 98 % | TEMPERATURE: 97.3 F | WEIGHT: 118.2 LBS | HEART RATE: 86 BPM

## 2021-04-15 DIAGNOSIS — I10 BENIGN ESSENTIAL HYPERTENSION: ICD-10-CM

## 2021-04-15 DIAGNOSIS — R63.4 WEIGHT LOSS: ICD-10-CM

## 2021-04-15 DIAGNOSIS — G93.0 EPIDERMOID CYST OF BRAIN: ICD-10-CM

## 2021-04-15 DIAGNOSIS — Z86.79 HISTORY OF INTRACEREBRAL HEMORRHAGE WITHOUT RESIDUAL DEFICIT: ICD-10-CM

## 2021-04-15 DIAGNOSIS — E55.9 VITAMIN D DEFICIENCY: ICD-10-CM

## 2021-04-15 DIAGNOSIS — Z86.79 HISTORY OF SUBDURAL HEMORRHAGE: ICD-10-CM

## 2021-04-15 DIAGNOSIS — R73.01 IFG (IMPAIRED FASTING GLUCOSE): ICD-10-CM

## 2021-04-15 DIAGNOSIS — Z00.00 ENCOUNTER FOR MEDICARE ANNUAL WELLNESS EXAM: Primary | ICD-10-CM

## 2021-04-15 DIAGNOSIS — E78.2 MIXED HYPERLIPIDEMIA: ICD-10-CM

## 2021-04-15 LAB — TSH SERPL DL<=0.05 MIU/L-ACNC: 0.56 UIU/ML (ref 0.36–3.74)

## 2021-04-15 PROCEDURE — 84443 ASSAY THYROID STIM HORMONE: CPT

## 2021-04-15 PROCEDURE — 99204 OFFICE O/P NEW MOD 45 MIN: CPT | Performed by: FAMILY MEDICINE

## 2021-04-15 PROCEDURE — 36415 COLL VENOUS BLD VENIPUNCTURE: CPT

## 2021-04-15 PROCEDURE — G0439 PPPS, SUBSEQ VISIT: HCPCS | Performed by: FAMILY MEDICINE

## 2021-04-15 NOTE — PATIENT INSTRUCTIONS
Medicare Preventive Visit Patient Instructions  Thank you for completing your Welcome to Medicare Visit or Medicare Annual Wellness Visit today  Your next wellness visit will be due in one year (4/16/2022)  The screening/preventive services that you may require over the next 5-10 years are detailed below  Some tests may not apply to you based off risk factors and/or age  Screening tests ordered at today's visit but not completed yet may show as past due  Also, please note that scanned in results may not display below  Preventive Screenings:  Service Recommendations Previous Testing/Comments   Colorectal Cancer Screening  * Colonoscopy    * Fecal Occult Blood Test (FOBT)/Fecal Immunochemical Test (FIT)  * Fecal DNA/Cologuard Test  * Flexible Sigmoidoscopy Age: 54-65 years old   Colonoscopy: every 10 years (may be performed more frequently if at higher risk)  OR  FOBT/FIT: every 1 year  OR  Cologuard: every 3 years  OR  Sigmoidoscopy: every 5 years  Screening may be recommended earlier than age 48 if at higher risk for colorectal cancer  Also, an individualized decision between you and your healthcare provider will decide whether screening between the ages of 74-80 would be appropriate  Colonoscopy: Not on file  FOBT/FIT: Not on file  Cologuard: Not on file  Sigmoidoscopy: Not on file          Breast Cancer Screening Age: 36 years old  Frequency: every 1-2 years  Not required if history of left and right mastectomy Mammogram: Not on file        Cervical Cancer Screening Between the ages of 21-29, pap smear recommended once every 3 years  Between the ages of 33-67, can perform pap smear with HPV co-testing every 5 years     Recommendations may differ for women with a history of total hysterectomy, cervical cancer, or abnormal pap smears in past  Pap Smear: 04/05/2019    Screening Not Indicated   Hepatitis C Screening Once for adults born between 1945 and 1965  More frequently in patients at high risk for Hepatitis C Hep C Antibody: Not on file        Diabetes Screening 1-2 times per year if you're at risk for diabetes or have pre-diabetes Fasting glucose: No results in last 5 years   A1C: 5 5 %    Screening Current   Cholesterol Screening Once every 5 years if you don't have a lipid disorder  May order more often based on risk factors  Lipid panel: 04/02/2021    Screening Not Indicated  History Lipid Disorder     Other Preventive Screenings Covered by Medicare:  1  Abdominal Aortic Aneurysm (AAA) Screening: covered once if your at risk  You're considered to be at risk if you have a family history of AAA  2  Lung Cancer Screening: covers low dose CT scan once per year if you meet all of the following conditions: (1) Age 50-69; (2) No signs or symptoms of lung cancer; (3) Current smoker or have quit smoking within the last 15 years; (4) You have a tobacco smoking history of at least 30 pack years (packs per day multiplied by number of years you smoked); (5) You get a written order from a healthcare provider  3  Glaucoma Screening: covered annually if you're considered high risk: (1) You have diabetes OR (2) Family history of glaucoma OR (3)  aged 48 and older OR (3)  American aged 72 and older  3  Osteoporosis Screening: covered every 2 years if you meet one of the following conditions: (1) You're estrogen deficient and at risk for osteoporosis based off medical history and other findings; (2) Have a vertebral abnormality; (3) On glucocorticoid therapy for more than 3 months; (4) Have primary hyperparathyroidism; (5) On osteoporosis medications and need to assess response to drug therapy  · Last bone density test (DXA Scan): Not on file  5  HIV Screening: covered annually if you're between the age of 12-76  Also covered annually if you are younger than 13 and older than 72 with risk factors for HIV infection   For pregnant patients, it is covered up to 3 times per pregnancy  Immunizations:  Immunization Recommendations   Influenza Vaccine Annual influenza vaccination during flu season is recommended for all persons aged >= 6 months who do not have contraindications   Pneumococcal Vaccine (Prevnar and Pneumovax)  * Prevnar = PCV13  * Pneumovax = PPSV23   Adults 25-60 years old: 1-3 doses may be recommended based on certain risk factors  Adults 72 years old: Prevnar (PCV13) vaccine recommended followed by Pneumovax (PPSV23) vaccine  If already received PPSV23 since turning 65, then PCV13 recommended at least one year after PPSV23 dose  Hepatitis B Vaccine 3 dose series if at intermediate or high risk (ex: diabetes, end stage renal disease, liver disease)   Tetanus (Td) Vaccine - COST NOT COVERED BY MEDICARE PART B Following completion of primary series, a booster dose should be given every 10 years to maintain immunity against tetanus  Td may also be given as tetanus wound prophylaxis  Tdap Vaccine - COST NOT COVERED BY MEDICARE PART B Recommended at least once for all adults  For pregnant patients, recommended with each pregnancy  Shingles Vaccine (Shingrix) - COST NOT COVERED BY MEDICARE PART B  2 shot series recommended in those aged 48 and above     Health Maintenance Due:  There are no preventive care reminders to display for this patient  Immunizations Due:  There are no preventive care reminders to display for this patient  Advance Directives   What are advance directives? Advance directives are legal documents that state your wishes and plans for medical care  These plans are made ahead of time in case you lose your ability to make decisions for yourself  Advance directives can apply to any medical decision, such as the treatments you want, and if you want to donate organs  What are the types of advance directives? There are many types of advance directives, and each state has rules about how to use them   You may choose a combination of any of the following:  · Living will: This is a written record of the treatment you want  You can also choose which treatments you do not want, which to limit, and which to stop at a certain time  This includes surgery, medicine, IV fluid, and tube feedings  · Durable power of  for healthcare [facility]): This is a written record that states who you want to make healthcare choices for you when you are unable to make them for yourself  This person, called a proxy, is usually a family member or a friend  You may choose more than 1 proxy  · Do not resuscitate (DNR) order:  A DNR order is used in case your heart stops beating or you stop breathing  It is a request not to have certain forms of treatment, such as CPR  A DNR order may be included in other types of advance directives  · Medical directive: This covers the care that you want if you are in a coma, near death, or unable to make decisions for yourself  You can list the treatments you want for each condition  Treatment may include pain medicine, surgery, blood transfusions, dialysis, IV or tube feedings, and a ventilator (breathing machine)  · Values history: This document has questions about your views, beliefs, and how you feel and think about life  This information can help others choose the care that you would choose  Why are advance directives important? An advance directive helps you control your care  Although spoken wishes may be used, it is better to have your wishes written down  Spoken wishes can be misunderstood, or not followed  Treatments may be given even if you do not want them  An advance directive may make it easier for your family to make difficult choices about your care  © Copyright [employer] 2018 Information is for End User's use only and may not be sold, redistributed or otherwise used for commercial purposes   All illustrations and images included in CareNotes® are the copyrighted property of A D A M , Inc  or [employer]

## 2021-04-15 NOTE — PROGRESS NOTES
Assessment and Plan:     Problem List Items Addressed This Visit        Endocrine    IFG (impaired fasting glucose) - Primary       Cardiovascular and Mediastinum    Benign essential hypertension       Nervous and Auditory    Epidermoid cyst of brain       Other    History of subdural hemorrhage    History of intracerebral hemorrhage without residual deficit    Mixed hyperlipidemia    Vitamin D deficiency      Other Visit Diagnoses     Weight loss        Relevant Orders    TSH, 3rd generation with Free T4 reflex           Preventive health issues were discussed with patient, and age appropriate screening tests were ordered as noted in patient's After Visit Summary  Personalized health advice and appropriate referrals for health education or preventive services given if needed, as noted in patient's After Visit Summary       History of Present Illness:     Patient presents for Medicare Annual Wellness visit    Patient Care Team:  Gio Bonds DO as PCP - General (Family Medicine)  MD Adonay Parson RN as RN Care Manager     Problem List:     Patient Active Problem List   Diagnosis    History of subdural hemorrhage    Headache    History of intracerebral hemorrhage without residual deficit    Epidermoid cyst of brain    Bilateral carotid artery stenosis    Temporal lobe lesion    Benign essential hypertension    Glaucoma    Mixed hyperlipidemia    Osteoarthritis of knee    Vitamin D deficiency    IFG (impaired fasting glucose)      Past Medical and Surgical History:     Past Medical History:   Diagnosis Date    Glaucoma     High blood pressure 11/30/2018    High cholesterol     Hypertension     Panic attacks 1998    Papanicolaou smear 03/17/2016    NEG    Post-menopausal     Stroke [facility])      Past Surgical History:   Procedure Laterality Date    COLONOSCOPY  2010    MAMMO (HISTORICAL) Bilateral 01/21/2019    No evidence of malignancy    TONSILLECTOMY      VAGINAL DELIVERY      X4      Family History:     Family History   Problem Relation Age of Onset    Heart disease Sister     Diabetes Sister     Stroke Sister     Dementia Mother     Ulcerative colitis Son     No Known Problems Daughter     Stroke Maternal Grandmother     Heart failure Maternal Grandfather     Diabetes Paternal Grandmother     No Known Problems Paternal Grandfather     No Known Problems Son     No Known Problems Daughter       Social History:     E-Cigarette/Vaping    E-Cigarette Use Never User      E-Cigarette/Vaping Substances    Nicotine No     THC No     CBD No     Flavoring No     Other No     Unknown No      Social History     Socioeconomic History    Marital status: /Civil Union     Spouse name: None    Number of children: 3    Years of education: None    Highest education level: None   Occupational History    Occupation: Retired   Social Needs    Financial resource strain: None    Food insecurity     Worry: None     Inability: None    Transportation needs     Medical: None     Non-medical: None   Tobacco Use    Smoking status: Never Smoker    Smokeless tobacco: Never Used   Substance and Sexual Activity    Alcohol use: Yes     Frequency: Monthly or less     Drinks per session: 1 or 2     Binge frequency: Less than monthly     Comment: social    Drug use: No    Sexual activity: Not Currently     Partners: Male     Birth control/protection: Post-menopausal   Lifestyle    Physical activity     Days per week: None     Minutes per session: None    Stress: None   Relationships    Social connections     Talks on phone: None     Gets together: None     Attends Confucianist service: None     Active member of club or organization: None     Attends meetings of clubs or organizations: None     Relationship status: None    Intimate partner violence     Fear of current or ex partner: None     Emotionally abused: None     Physically abused: None     Forced sexual activity: None   Other Topics Concern    None   Social History Narrative    None      Medications and Allergies:     Current Outpatient Medications   Medication Sig Dispense Refill    amLODIPine (NORVASC) 10 mg tablet Take 1 tablet (10 mg total) by mouth daily 90 tablet 3    atorvastatin (LIPITOR) 40 mg tablet Take 1 tablet (40 mg total) by mouth daily 30 tablet 5    Cholecalciferol (Vitamin D3) 50 MCG (2000 UT) capsule Take 2,000 Units by mouth daily       lisinopril (ZESTRIL) 10 mg tablet Take 1 tablet (10 mg total) by mouth daily 30 tablet 3    prednisoLONE acetate (PRED FORTE) 1 % ophthalmic suspension INSTILL 1 DROP IN THE RIGHT EYE EVERY OTHER DAY       No current facility-administered medications for this visit  Allergies   Allergen Reactions    Codeine     Sulfa Antibiotics       Immunizations:     Immunization History   Administered Date(s) Administered    INFLUENZA 10/12/2018    Influenza Quadrivalent 3 years and older 10/16/2020    Influenza, injectable, quadrivalent, preservative free 0 5 mL 10/12/2018    Pneumococcal Conjugate 13-Valent 10/17/2019    Pneumococcal Polysaccharide PPV23 01/01/2007    SARS-CoV-2 / COVID-19 mRNA IM (Pfizer-BioNTech) 01/20/2021, 02/10/2021    Tdap 01/01/2007, 07/07/2017      Health Maintenance: There are no preventive care reminders to display for this patient  There are no preventive care reminders to display for this patient  Medicare Health Risk Assessment:     /60   Pulse 86   Temp (!) 97 3 °F (36 3 °C)   Resp 18   Ht 4' 11 8" (1 519 m)   Wt 53 6 kg (118 lb 3 2 oz)   SpO2 98%   BMI 23 24 kg/m²      Clementina Colbert is here for her Subsequent Wellness visit  Health Risk Assessment:   Patient rates overall health as good  Patient feels that their physical health rating is same  Patient is satisfied with their life  Eyesight was rated as slightly worse  Hearing was rated as same   Patient feels that their emotional and mental health rating is same  Patients states they are sometimes angry  Patient states they are never, rarely unusually tired/fatigued  Pain experienced in the last 7 days has been none  Patient states that she has experienced no weight loss or gain in last 6 months  Depression Screening:   PHQ-2 Score: 1  PHQ-9 Score: 2      Fall Risk Screening: In the past year, patient has experienced: no history of falling in past year      Urinary Incontinence Screening:   Patient has not leaked urine accidently in the last six months  Home Safety:  Patient does not have trouble with stairs inside or outside of their home  Patient has working smoke alarms and has working carbon monoxide detector  Home safety hazards include: none  Nutrition:   Current diet is Regular and No Added Salt  Medications:   Patient is currently taking over-the-counter supplements  OTC medications include: see medication list  Patient is able to manage medications  Activities of Daily Living (ADLs)/Instrumental Activities of Daily Living (IADLs):   Walk and transfer into and out of bed and chair?: Yes  Dress and groom yourself?: Yes    Bathe or shower yourself?: Yes    Feed yourself? Yes  Do your laundry/housekeeping?: Yes  Manage your money, pay your bills and track your expenses?: Yes  Make your own meals?: Yes    Do your own shopping?: Yes    Previous Hospitalizations:   Any hospitalizations or ED visits within the last 12 months?: Yes    How many hospitalizations have you had in the last year?: 1-2    Advance Care Planning:   Living will: Yes    Durable POA for healthcare: Yes    Advanced directive: Yes    Advanced directive counseling given: Yes      Comments: Pt has Adanced directive  Will bring in for her chart  DNR if no hope       PREVENTIVE SCREENINGS      Cardiovascular Screening:    General: Screening Not Indicated and History Lipid Disorder      Diabetes Screening:     General: Screening Current      Colorectal Cancer Screening:     General: Screening Not Indicated      Breast Cancer Screening:     General: Screening Current      Cervical Cancer Screening:    General: Screening Not Indicated      Osteoporosis Screening:    General: Patient Declines      Abdominal Aortic Aneurysm (AAA) Screening:        General: Screening Not Indicated      Lung Cancer Screening:     General: Screening Not Indicated      Suzanne Lee DO

## 2021-04-16 ENCOUNTER — PATIENT OUTREACH (OUTPATIENT)
Dept: CASE MANAGEMENT | Facility: OTHER | Age: 76
End: 2021-04-16

## 2021-04-16 ENCOUNTER — TELEPHONE (OUTPATIENT)
Dept: ADMINISTRATIVE | Facility: OTHER | Age: 76
End: 2021-04-16

## 2021-04-16 NOTE — PROGRESS NOTES
Outreach TC to patient for CM assessment  No answer to call  Left message on voicemail requesting a return call from patient to Brionna Garces 12, BSN; Outpatient Care Manager @ [phone]  Second unanswered call to patient  Chart review reveals that patient did establish with a new PCP  Patient had lipid panel and TSH, and free T4 lab work completed which was WNL

## 2021-04-16 NOTE — TELEPHONE ENCOUNTER
----- Message from Alpa Rao LPN sent at 0/24/8890  1:03 PM EDT -----  Regarding: Care Gap Request  04/15/21 1:03 PM    Hello, our patient attached above has had Mammogram completed/performed  Please assist in updating the patient chart by pulling the Care Everywhere (CE) document  The date of service is 01/28/20       Thank you,  Alpa Rao LPN  [facility]  Kody Chance

## 2021-04-16 NOTE — RESULT ENCOUNTER NOTE
See result note to patient:  Chauncey Faulkner,   Your results are back for the blood work  Good news - it is normal    Try to work on eating small frequent meals/snacks that are high in protein - low fat cheese, nuts, chicken, fish, peanut butter, beans, yogurt, etc    Let me know if you have any questions   Take care,   Mendez Dominguez, DO

## 2021-04-16 NOTE — TELEPHONE ENCOUNTER
Upon review of the In Basket request we were able to locate, review, and update the patient chart as requested for Mammogram     Any additional questions or concerns should be emailed to the Practice Liaisons via [email or web address] email, please do not reply via In Basket      Thank you  Vikki Mcrae

## 2021-04-22 ENCOUNTER — PATIENT OUTREACH (OUTPATIENT)
Dept: CASE MANAGEMENT | Facility: OTHER | Age: 76
End: 2021-04-22

## 2021-04-22 NOTE — PROGRESS NOTES
Outreach TC to patient for CM assessment  No answer to phone and no voicemail  This is the third unanswered call to patient will send unable to contact letter via [address]  Chart review will continue until end of episode  Chart review reveals that patient canceled her cardiology appointment for 4/21/21  Patient has an appointment scheduled with [facility]- cardiology on 5/14/21  Patient did have a TSH, Free T4 completed which was WNL

## 2021-05-04 ENCOUNTER — PATIENT OUTREACH (OUTPATIENT)
Dept: CASE MANAGEMENT | Facility: OTHER | Age: 76
End: 2021-05-04

## 2021-05-04 NOTE — PROGRESS NOTES
Chart review reveals that patient will have an MRI of her brain on 5/28/21 and will have an OV with neurosurgery on 6/1/21  No recent OV, labs or imaging are available for review

## 2021-05-21 ENCOUNTER — PATIENT OUTREACH (OUTPATIENT)
Dept: CASE MANAGEMENT | Facility: OTHER | Age: 76
End: 2021-05-21

## 2021-05-21 NOTE — PROGRESS NOTES
Chart review reveals that patient had an OV with cardiology at [facility] on 5/14/21  No changes to medication were made  Patient will have an MRI completed on 5/28/21 and will have an OV with neurosurgery on 6/1/21  No other OV, imaging or lab results

## 2021-05-24 ENCOUNTER — PATIENT OUTREACH (OUTPATIENT)
Dept: CASE MANAGEMENT | Facility: OTHER | Age: 76
End: 2021-05-24

## 2021-05-24 NOTE — PROGRESS NOTES
Chart review reveals no new OV, labs or imaging to review  BPCI episode end  Resolved episode, removed self from care team and updated care coordination note

## 2021-05-28 ENCOUNTER — HOSPITAL ENCOUNTER (OUTPATIENT)
Dept: MRI IMAGING | Facility: HOSPITAL | Age: 76
Discharge: HOME | End: 2021-05-28
Payer: MEDICARE

## 2021-05-28 DIAGNOSIS — G93.0 EPIDERMOID CYST OF BRAIN: ICD-10-CM

## 2021-05-28 PROCEDURE — G1004 CDSM NDSC: HCPCS

## 2021-05-28 PROCEDURE — 70551 MRI BRAIN STEM W/O DYE: CPT

## 2021-06-01 ENCOUNTER — OFFICE VISIT (OUTPATIENT)
Dept: NEUROSURGERY | Facility: CLINIC | Age: 76
End: 2021-06-01
Payer: MEDICARE

## 2021-06-01 VITALS
SYSTOLIC BLOOD PRESSURE: 150 MMHG | HEIGHT: 60 IN | HEART RATE: 81 BPM | RESPIRATION RATE: 16 BRPM | BODY MASS INDEX: 22.78 KG/M2 | DIASTOLIC BLOOD PRESSURE: 60 MMHG | WEIGHT: 116 LBS | TEMPERATURE: 98 F

## 2021-06-01 DIAGNOSIS — Z86.79 HISTORY OF SUBDURAL HEMORRHAGE: Primary | ICD-10-CM

## 2021-06-01 DIAGNOSIS — Z86.79 HISTORY OF INTRACEREBRAL HEMORRHAGE WITHOUT RESIDUAL DEFICIT: ICD-10-CM

## 2021-06-01 DIAGNOSIS — G93.9 TEMPORAL LOBE LESION: ICD-10-CM

## 2021-06-01 PROCEDURE — 99214 OFFICE O/P EST MOD 30 MIN: CPT | Performed by: NURSE PRACTITIONER

## 2021-06-01 NOTE — ASSESSMENT & PLAN NOTE
Patient presents today is 3 month follow-up with repeat MRI brain with concerns for underlying lesion in left temporal lobe  · Originally presented with multiple days of visual changes and 1-2 day history of headaches  Diagnosed with occipital ICH with ICH score 1, IVH and left temporal hypodensity with concerns for underlying lesion  Imaging:   · MRI brain wo 5/28/21:No acute disease  Expected Evolution of anteromedial left temporal and anteroinferior right occipital hemorrhages  Associated intraventricular and right tentorial hemorrhages  Given age of patient and underlying parenchymal changes query amyloid angiopathy  Plan:   · Continue monitor for any progressive or new neurological symptoms  · Imaging was reviewed with patient and   · Ongoing close monitoring of blood pressure  · Currently on amlodipine and lisinopril  · Continue follow-up with Neurology and cardiology   · Patient will follow-up in 1 week with MRI brain with contrast to further evaluate for possible underlying lesion or sooner symptoms worsen  · Patient made aware to contact Neurosurgery with any further questions or concerns

## 2021-06-01 NOTE — PROGRESS NOTES
Neurosurgery Office Note  Cliff Herr 67 y o  female MRN: [id number]      Assessment/Plan     Temporal lobe lesion  Patient presents today is 3 month follow-up with repeat MRI brain with concerns for underlying lesion in left temporal lobe  · Originally presented with multiple days of visual changes and 1-2 day history of headaches  Diagnosed with occipital ICH with ICH score 1, IVH and left temporal hypodensity with concerns for underlying lesion  Imaging:   · MRI brain wo 5/28/21:No acute disease  Expected Evolution of anteromedial left temporal and anteroinferior right occipital hemorrhages  Associated intraventricular and right tentorial hemorrhages  Given age of patient and underlying parenchymal changes query amyloid angiopathy  Plan:   · Continue monitor for any progressive or new neurological symptoms  · Imaging was reviewed with patient and   · Ongoing close monitoring of blood pressure  · Currently on amlodipine and lisinopril  · Continue follow-up with Neurology and cardiology   · Patient will follow-up in 1 week with MRI brain with contrast to further evaluate for possible underlying lesion or sooner symptoms worsen  · Patient made aware to contact Neurosurgery with any further questions or concerns         Diagnoses and all orders for this visit:    History of subdural hemorrhage  -     Cancel: MRI brain with contrast; Future  -     MRI brain with contrast; Future    History of intracerebral hemorrhage without residual deficit  -     Cancel: MRI brain with contrast; Future  -     MRI brain with contrast; Future    Temporal lobe lesion  -     Cancel: MRI brain with contrast; Future  -     MRI brain with contrast; Future            CHIEF COMPLAINT    Chief Complaint   Patient presents with    Follow-up       HISTORY    History of Present Illness     67y o  year old female with past medical history significant for hypertension, glaucoma,hyperlipidemia, and bilateral cataract surgery  Patient presents to outpatient office today for 3 month follow-up and to review MRI brain with concerns for underlying lesion in the left temporal lobe  Patient unfortunately thought she only needed MRI without contrast and did not receive contrast     Patient originally presented to the hospital in February with visual complaints x3 days along with headaches for 1-2 days, patient was found to have cerebral hemorrhage  Patient was status post bilateral cataract surgery along with glaucoma on the right at time and noted to have difficulty with her vision  Patient went to see her ophthalmologist who prompted her to go to the nearest ED for further evaluation  On CT scan, patient was found to have a right occipital IPH along with a right subdural hematoma and minimal IVH  In addition there was noted to be hypodensity in the left temporal lobe with adjacent hemorrhage with concern for underlying lesion  Patient underwent repeat imaging which was stable and no neurosurgical intervention was recommended  Patient was seen by cardiology and is currently on medication for hypertension  Patient also underwent CTA head to rule out vascular etiology for multifactorial hemorrhages  This demonstrated no evidence of aneurysm or dissection/vascular malformation  Patient also following with Neurology  Today, patient has no complaints other than at times she continues to have word-finding difficulties  She denies any visual disturbances or changes  She denies any headaches, dizziness, chest pain, shortness of breath, abdominal pain, nausea, vomiting, diarrhea, no problems bowel or bladder, no new weakness or numbness/tingling  Patient saw her ophthalmologist for a right eye infection and continues antibiotic eyedrops per Ophthalmology      Given patient received MRI without contrast will bring patient back in 1 week with MRI brain with contrast to further evaluate hypodensity in left temporal lobe concerning for underlying mass  Patient made aware to follow-up sooner if she develops new or worsening neurological changes  Patient made aware to contact Neurosurgery with any further questions or concerns  HPI    See Discussion    REVIEW OF SYSTEMS    Review of Systems   Constitutional: Negative  HENT: Negative  Negative for hearing loss  Eyes: Negative  Negative for visual disturbance  Respiratory: Negative  Negative for shortness of breath and wheezing  Cardiovascular: Negative  Negative for chest pain  Gastrointestinal: Negative  Negative for nausea and vomiting  Endocrine: Negative  Genitourinary: Negative  Musculoskeletal: Negative  Negative for gait problem  Skin: Negative  Allergic/Immunologic: Negative  Neurological: Positive for speech difficulty (word finding)  Negative for dizziness, seizures, weakness, numbness and headaches  Hematological: Negative  Does not bruise/bleed easily  Psychiatric/Behavioral: Positive for sleep disturbance  ROS reviewed with patient and agree and changes were made as needed  Meds/Allergies     Current Outpatient Medications   Medication Sig Dispense Refill    amLODIPine (NORVASC) 10 mg tablet Take 1 tablet (10 mg total) by mouth daily 90 tablet 3    atorvastatin (LIPITOR) 40 mg tablet Take 1 tablet (40 mg total) by mouth daily 30 tablet 5    Cholecalciferol (Vitamin D3) 50 MCG (2000 UT) capsule Take 2,000 Units by mouth daily       lisinopril (ZESTRIL) 10 mg tablet Take 1 tablet (10 mg total) by mouth daily 30 tablet 3    prednisoLONE acetate (PRED FORTE) 1 % ophthalmic suspension INSTILL 1 DROP IN THE RIGHT EYE EVERY OTHER DAY       No current facility-administered medications for this visit          Allergies   Allergen Reactions    Codeine     Sulfa Antibiotics        PAST HISTORY    Past Medical History:   Diagnosis Date    Glaucoma     High blood pressure 11/30/2018    High cholesterol     Hypertension     Panic attacks 1998    Papanicolaou smear 03/17/2016    NEG    Post-menopausal     Stroke [facility])        Past Surgical History:   Procedure Laterality Date    COLONOSCOPY  2010    MAMMO (HISTORICAL) Bilateral 01/21/2019    No evidence of malignancy    TONSILLECTOMY      VAGINAL DELIVERY      X4       Social History     Tobacco Use    Smoking status: Never Smoker    Smokeless tobacco: Never Used   Substance Use Topics    Alcohol use: Yes     Frequency: Monthly or less     Drinks per session: 1 or 2     Binge frequency: Less than monthly     Comment: social    Drug use: No       Family History   Problem Relation Age of Onset    Heart disease Sister     Diabetes Sister     Stroke Sister     Dementia Mother     Ulcerative colitis Son     No Known Problems Daughter     Stroke Maternal Grandmother     Heart failure Maternal Grandfather     Diabetes Paternal Grandmother     No Known Problems Paternal Grandfather     No Known Problems Son     No Known Problems Daughter          Above history personally reviewed  EXAM    Vitals:Blood pressure 150/60, pulse 81, temperature 98 °F (36 7 °C), temperature source Tympanic, resp  rate 16, height 4' 11 8" (1 519 m), weight 52 6 kg (116 lb), not currently breastfeeding  ,Body mass index is 22 81 kg/m²  Physical Exam  Vitals signs reviewed  Exam conducted with a chaperone present (Patient accompanied by her )  Constitutional:       General: She is awake  Appearance: Normal appearance  HENT:      Head: Normocephalic and atraumatic  Eyes:      Extraocular Movements: Extraocular movements intact and EOM normal       Conjunctiva/sclera: Conjunctivae normal       Pupils: Pupils are equal, round, and reactive to light  Neck:      Musculoskeletal: Normal range of motion and neck supple  Cardiovascular:      Rate and Rhythm: Normal rate  Pulmonary:      Effort: Pulmonary effort is normal  No respiratory distress     Chest:      Chest wall: No tenderness  Abdominal:      General: There is no distension  Palpations: Abdomen is soft  Tenderness: There is no abdominal tenderness  Musculoskeletal: Normal range of motion  Skin:     General: Skin is warm and dry  Neurological:      Mental Status: She is alert and oriented to person, place, and time  Coordination: Finger-Nose-Finger Test normal       Gait: Gait is intact  Deep Tendon Reflexes: Strength normal       Reflex Scores:       Tricep reflexes are 2+ on the right side and 2+ on the left side  Bicep reflexes are 2+ on the right side and 2+ on the left side  Brachioradialis reflexes are 2+ on the right side and 2+ on the left side  Patellar reflexes are 2+ on the right side and 2+ on the left side  Psychiatric:         Attention and Perception: Attention and perception normal          Mood and Affect: Mood and affect normal          Speech: Speech normal          Behavior: Behavior normal  Behavior is cooperative  Thought Content: Thought content normal          Cognition and Memory: Cognition and memory normal          Judgment: Judgment normal          Neurologic Exam     Mental Status   Oriented to person, place, and time  Follows 2 step commands  Attention: normal  Concentration: normal    Speech: speech is normal   Level of consciousness: alert  Knowledge: good  Able to perform simple calculations  Able to name object  Able to repeat  Normal comprehension  Cranial Nerves     CN III, IV, VI   Pupils are equal, round, and reactive to light  Extraocular motions are normal    CN III: no CN III palsy  CN VI: no CN VI palsy  Nystagmus: none   Diplopia: none  Conjugate gaze: present    CN V   Facial sensation intact  CN VII   Facial expression full, symmetric       CN VIII   CN VIII normal    Hearing: intact    CN IX, X   CN IX normal      CN XI   CN XI normal      CN XII   CN XII normal      Motor Exam   Muscle bulk: normal  Overall muscle tone: normal  Right arm pronator drift: absent  Left arm pronator drift: absent    Strength   Strength 5/5 throughout  Sensory Exam   Light touch normal    Proprioception normal    JPS and DST intact     Gait, Coordination, and Reflexes     Gait  Gait: normal    Coordination   Finger to nose coordination: normal    Tremor   Resting tremor: absent  Intention tremor: absent  Action tremor: absent    Reflexes   Right brachioradialis: 2+  Left brachioradialis: 2+  Right biceps: 2+  Left biceps: 2+  Right triceps: 2+  Left triceps: 2+  Right patellar: 2+  Left patellar: 2+  Right Purvis: absent  Left Purvis: absent  Right ankle clonus: absent  Left ankle clonus: absent        MEDICAL DECISION MAKING    Imaging Studies:     Mri Brain Wo Contrast    Result Date: 6/1/2021  Narrative: MRI BRAIN WITHOUT CONTRAST INDICATION: G93 0: Cerebral cysts  COMPARISON:   Multiple prior studies to include MR 2/21/2021, CT 2/21/2021, CTA 2/22/2021 TECHNIQUE:  Sagittal T1, axial T2, axial FLAIR, axial T1, axial [place] and axial diffusion imaging  IMAGE QUALITY:  Diagnostic  FINDINGS: BRAIN PARENCHYMA:  Footprint of multifocal intracranial hemorrhage without evidence for acute disease  Expected evolution of the hemorrhagic focus within the undersurface of the right occipital lobe  Resolution of vasogenic edema  Hemosiderin within the adjacent parenchyma and occipital horn  Minor hemosiderin along the right tentorium, site of previous hemorrhage  A cystic focus within the medial lower left temporal horn is collapsing  Blood products along the perimeter of this which appear contiguous with the temporal horn suggesting this may represent a porencephalic hemorrhagic cyst   Blood products within the  adjacent occipital horn  The tiny focus of hemosiderin in the right frontal operculum 7:71 is stable  Moderately advanced chronic small vessel disease  VENTRICLES:  Normal for the patient's age   SELLA AND PITUITARY GLAND:  Normal  ORBITS: Bilateral lens implants PARANASAL SINUSES:  Trace mucosal thickening  VASCULATURE:  Evaluation of the major intracranial vasculature demonstrates appropriate flow voids  CALVARIUM AND SKULL BASE:  Multiple pelvic granulations invest the occipital  EXTRACRANIAL SOFT TISSUES:  Normal      Impression: No acute disease  Expected Evolution of anteromedial left temporal and anteroinferior right occipital hemorrhages  Associated intraventricular and right tentorial hemorrhages  Given age of patient and underlying parenchymal changes query amyloid angiopathy  Repeat MR with contrast suggested in 3 months to exclude exceedingly low likelihood of underlying parenchymal lesion  Workstation performed: [id number]       I have personally reviewed pertinent reports     and I have personally reviewed pertinent films in PACS

## 2021-06-07 ENCOUNTER — HOSPITAL ENCOUNTER (OUTPATIENT)
Dept: RADIOLOGY | Facility: HOSPITAL | Age: 76
Discharge: HOME | End: 2021-06-07
Payer: MEDICARE

## 2021-06-07 DIAGNOSIS — G93.9 TEMPORAL LOBE LESION: ICD-10-CM

## 2021-06-07 DIAGNOSIS — Z86.79 HISTORY OF INTRACEREBRAL HEMORRHAGE WITHOUT RESIDUAL DEFICIT: ICD-10-CM

## 2021-06-07 DIAGNOSIS — Z86.79 HISTORY OF SUBDURAL HEMORRHAGE: ICD-10-CM

## 2021-06-07 PROCEDURE — 70552 MRI BRAIN STEM W/DYE: CPT

## 2021-06-07 PROCEDURE — G1004 CDSM NDSC: HCPCS

## 2021-06-07 PROCEDURE — A9585 GADOBUTROL INJECTION: HCPCS | Performed by: NURSE PRACTITIONER

## 2021-06-07 RX ADMIN — GADOBUTROL 5 ML: 604.72 INJECTION INTRAVENOUS at 16:08

## 2021-06-08 ENCOUNTER — OFFICE VISIT (OUTPATIENT)
Dept: NEUROSURGERY | Facility: CLINIC | Age: 76
End: 2021-06-08
Payer: MEDICARE

## 2021-06-08 VITALS
WEIGHT: 116 LBS | RESPIRATION RATE: 16 BRPM | HEIGHT: 60 IN | DIASTOLIC BLOOD PRESSURE: 50 MMHG | BODY MASS INDEX: 22.78 KG/M2 | SYSTOLIC BLOOD PRESSURE: 160 MMHG | TEMPERATURE: 98.5 F | HEART RATE: 84 BPM

## 2021-06-08 DIAGNOSIS — Z86.79 HISTORY OF INTRACEREBRAL HEMORRHAGE WITHOUT RESIDUAL DEFICIT: Primary | ICD-10-CM

## 2021-06-08 DIAGNOSIS — R47.89 WORD FINDING DIFFICULTY: ICD-10-CM

## 2021-06-08 PROBLEM — G93.0 EPIDERMOID CYST OF BRAIN: Status: RESOLVED | Noted: 2021-02-22 | Resolved: 2021-06-08

## 2021-06-08 PROCEDURE — 99213 OFFICE O/P EST LOW 20 MIN: CPT | Performed by: NEUROLOGICAL SURGERY

## 2021-06-08 NOTE — PROGRESS NOTES
Neurosurgery Office Note  Roxana Figueroa 67 y o  female MRN: [id number]      Assessment/Plan      Diagnoses and all orders for this visit:    History of intracerebral hemorrhage without residual deficit  -     Ambulatory referral to Speech Therapy; Future  -     Ambulatory referral to Occupational Therapy; Future    Word finding difficulty  -     Ambulatory referral to Speech Therapy; Future  -     Ambulatory referral to Occupational Therapy; Future          Discussion:    58-year-old woman who suffered intracerebral hemorrhage in the past   Most recently, she had a left temporal hemorrhage in 02/2021  She was recommended to have a follow-up MRI scan of the brain with without gadolinium in 3 months  She had a noncontrast MRI scan done which was reviewed with her in our office last week period shows no suggestion of recurrent tumor, although was done without contrast   She has now had a contrast MRI scan of the brain which similarly, shows no sign of pathologic enhancement  As such, she needs no further neurosurgical intervention or follow-up  She does have follow-up with Dr Gabriella Daniel Neurology in a few months  She does mention she has continued word-finding difficulties  This is not an expressive aphasia issue but rather remembering words she would want to use  She was referred to occupational therapy in the past, but when she contacted them, she was not able to schedule or find someone that would assist her  As such, placed referrals again, and ask our office to reach out to speech and occupational therapy to find for her an appropriate therapist     Counseling / Coordination of Care  I spent 15 minutes with the patient, more than 50% was spent on counseling and/or coordination of care        CHIEF COMPLAINT    Chief Complaint   Patient presents with    Follow-up       HISTORY    History of Present Illness     67y o  year old female     HPI    See Discussion    REVIEW OF SYSTEMS    Review of Systems   Constitutional: Negative  HENT: Negative  Negative for hearing loss  Eyes: Negative  Negative for visual disturbance  Respiratory: Negative  Negative for shortness of breath and wheezing  Cardiovascular: Negative  Negative for chest pain  Gastrointestinal: Negative  Negative for nausea and vomiting  Endocrine: Negative  Genitourinary: Negative  Musculoskeletal: Negative  Negative for gait problem  Skin: Negative  Allergic/Immunologic: Negative  Neurological: Positive for speech difficulty (word finding)  Negative for dizziness, seizures, weakness, numbness and headaches  Hematological: Negative  Does not bruise/bleed easily  Psychiatric/Behavioral: Negative for sleep disturbance  Meds/Allergies     Current Outpatient Medications   Medication Sig Dispense Refill    amLODIPine (NORVASC) 10 mg tablet Take 1 tablet (10 mg total) by mouth daily 90 tablet 3    atorvastatin (LIPITOR) 40 mg tablet Take 1 tablet (40 mg total) by mouth daily 30 tablet 5    Cholecalciferol (Vitamin D3) 50 MCG (2000 UT) capsule Take 2,000 Units by mouth daily       lisinopril (ZESTRIL) 10 mg tablet Take 1 tablet (10 mg total) by mouth daily 30 tablet 3    prednisoLONE acetate (PRED FORTE) 1 % ophthalmic suspension INSTILL 1 DROP IN THE RIGHT EYE EVERY OTHER DAY       No current facility-administered medications for this visit          Allergies   Allergen Reactions    Codeine     Sulfa Antibiotics        PAST HISTORY    Past Medical History:   Diagnosis Date    Glaucoma     High blood pressure 11/30/2018    High cholesterol     Hypertension     Panic attacks 1998    Papanicolaou smear 03/17/2016    NEG    Post-menopausal     Stroke [facility])        Past Surgical History:   Procedure Laterality Date    COLONOSCOPY  2010    MAMMO (HISTORICAL) Bilateral 01/21/2019    No evidence of malignancy    TONSILLECTOMY      VAGINAL DELIVERY      X4       Social History     Tobacco Use  Smoking status: Never Smoker    Smokeless tobacco: Never Used   Substance Use Topics    Alcohol use: Yes     Frequency: Monthly or less     Drinks per session: 1 or 2     Binge frequency: Less than monthly     Comment: social    Drug use: No       Family History   Problem Relation Age of Onset    Heart disease Sister     Diabetes Sister     Stroke Sister     Dementia Mother     Ulcerative colitis Son     No Known Problems Daughter     Stroke Maternal Grandmother     Heart failure Maternal Grandfather     Diabetes Paternal Grandmother     No Known Problems Paternal Grandfather     No Known Problems Son     No Known Problems Daughter          The following portions of the patient's history were reviewed in this encounter and updated as appropriate: Past medical, surgical, family, and social history, as well as medications, allergies, and review of systems  EXAM    Vitals:Blood pressure 160/50, pulse 84, temperature 98 5 °F (36 9 °C), temperature source Tympanic, resp  rate 16, height 4' 11 8" (1 519 m), weight 52 6 kg (116 lb), not currently breastfeeding  ,Body mass index is 22 81 kg/m²  Physical Exam    Neurologic Exam      MEDICAL DECISION MAKING    Imaging Studies:     Mri Brain Wo Contrast    Result Date: 6/1/2021  Narrative: MRI BRAIN WITHOUT CONTRAST INDICATION: G93 0: Cerebral cysts  COMPARISON:   Multiple prior studies to include MR 2/21/2021, CT 2/21/2021, CTA 2/22/2021 TECHNIQUE:  Sagittal T1, axial T2, axial FLAIR, axial T1, axial [place] and axial diffusion imaging  IMAGE QUALITY:  Diagnostic  FINDINGS: BRAIN PARENCHYMA:  Footprint of multifocal intracranial hemorrhage without evidence for acute disease  Expected evolution of the hemorrhagic focus within the undersurface of the right occipital lobe  Resolution of vasogenic edema  Hemosiderin within the adjacent parenchyma and occipital horn  Minor hemosiderin along the right tentorium, site of previous hemorrhage   A cystic focus within the medial lower left temporal horn is collapsing  Blood products along the perimeter of this which appear contiguous with the temporal horn suggesting this may represent a porencephalic hemorrhagic cyst   Blood products within the  adjacent occipital horn  The tiny focus of hemosiderin in the right frontal operculum 7:71 is stable  Moderately advanced chronic small vessel disease  VENTRICLES:  Normal for the patient's age  SELLA AND PITUITARY GLAND:  Normal  ORBITS:  Bilateral lens implants PARANASAL SINUSES:  Trace mucosal thickening  VASCULATURE:  Evaluation of the major intracranial vasculature demonstrates appropriate flow voids  CALVARIUM AND SKULL BASE:  Multiple pelvic granulations invest the occipital  EXTRACRANIAL SOFT TISSUES:  Normal      Impression: No acute disease  Expected Evolution of anteromedial left temporal and anteroinferior right occipital hemorrhages  Associated intraventricular and right tentorial hemorrhages  Given age of patient and underlying parenchymal changes query amyloid angiopathy  Repeat MR with contrast suggested in 3 months to exclude exceedingly low likelihood of underlying parenchymal lesion  Workstation performed: [id number]     Mri Brain With Contrast    Result Date: 6/8/2021  Narrative: MRI BRAIN WITH INTRAVENOUS CONTRAST INDICATION: Z86 79: Personal history of other diseases of the circulatory system G93 9: Disorder of brain, unspecified  COMPARISON:  MR 5/28/2021 TECHNIQUE:  Axial T1 and FLAIR imaging  Sagittal, axial and coronal T1 postcontrast    Axial BRAVO post contrast   IV Contrast:  5 mL of Gadobutrol injection (SINGLE-DOSE)  IMAGE QUALITY:   Diagnostic  FINDINGS: POSTCONTRAST IMAGING:  Postcontrast imaging of the brain demonstrates no abnormal enhancement  ADDITIONAL PERTINENT FINDINGS:  Extensive parenchymal changes previously described reflect advanced chronic small vessel disease    No hemorrhage, previously noted in the and coronal medial left temporal lobe and posteromedial right temporal occipital parenchyma are less evident without [place] and gradient imaging  No enhancement in either location  Intraventricular and tentorial hemorrhage is also, are not evident  Impression: No pathologic enhancement  Footprint of prior multifocal hemorrhage more apparent on the susceptibility or gradient imaging which accompanies a initial exam 5/28/2021  Workstation performed: [id number]       I have personally reviewed pertinent reports     and I have personally reviewed pertinent films in PACS

## 2021-06-21 DIAGNOSIS — I10 ESSENTIAL HYPERTENSION: ICD-10-CM

## 2021-06-21 RX ORDER — LISINOPRIL 10 MG/1
10 TABLET ORAL DAILY
Qty: 30 TABLET | Refills: 3 | Status: SHIPPED | OUTPATIENT
Start: 2021-06-21 | End: 2021-10-24 | Stop reason: SDUPTHER

## 2021-08-17 DIAGNOSIS — E78.00 PURE HYPERCHOLESTEROLEMIA: ICD-10-CM

## 2021-08-17 RX ORDER — ATORVASTATIN CALCIUM 40 MG/1
40 TABLET, FILM COATED ORAL DAILY
Qty: 90 TABLET | Refills: 3 | Status: SHIPPED | OUTPATIENT
Start: 2021-08-17 | End: 2022-08-08 | Stop reason: SDUPTHER

## 2021-10-24 DIAGNOSIS — I10 ESSENTIAL HYPERTENSION: ICD-10-CM

## 2021-10-25 ENCOUNTER — TELEPHONE (OUTPATIENT)
Dept: FAMILY MEDICINE CLINIC | Facility: CLINIC | Age: 76
End: 2021-10-25

## 2021-10-25 ENCOUNTER — TELEPHONE (OUTPATIENT)
Dept: CARDIOLOGY CLINIC | Facility: CLINIC | Age: 76
End: 2021-10-25

## 2021-10-25 RX ORDER — LISINOPRIL 10 MG/1
10 TABLET ORAL DAILY
Qty: 90 TABLET | Refills: 0 | Status: SHIPPED | OUTPATIENT
Start: 2021-10-25 | End: 2021-10-28

## 2021-10-28 ENCOUNTER — OFFICE VISIT (OUTPATIENT)
Dept: FAMILY MEDICINE CLINIC | Facility: CLINIC | Age: 76
End: 2021-10-28
Payer: MEDICARE

## 2021-10-28 VITALS
DIASTOLIC BLOOD PRESSURE: 56 MMHG | WEIGHT: 112.4 LBS | TEMPERATURE: 97.5 F | HEART RATE: 100 BPM | SYSTOLIC BLOOD PRESSURE: 148 MMHG | HEIGHT: 60 IN | BODY MASS INDEX: 22.07 KG/M2 | RESPIRATION RATE: 18 BRPM | OXYGEN SATURATION: 98 %

## 2021-10-28 DIAGNOSIS — Z11.59 NEED FOR HEPATITIS C SCREENING TEST: ICD-10-CM

## 2021-10-28 DIAGNOSIS — I10 BENIGN ESSENTIAL HYPERTENSION: ICD-10-CM

## 2021-10-28 DIAGNOSIS — R07.89 OTHER CHEST PAIN: Primary | ICD-10-CM

## 2021-10-28 DIAGNOSIS — R63.4 WEIGHT LOSS: ICD-10-CM

## 2021-10-28 DIAGNOSIS — R60.0 LEG EDEMA: ICD-10-CM

## 2021-10-28 PROCEDURE — 99214 OFFICE O/P EST MOD 30 MIN: CPT | Performed by: FAMILY MEDICINE

## 2021-10-28 RX ORDER — LISINOPRIL 20 MG/1
20 TABLET ORAL DAILY
Qty: 90 TABLET | Refills: 3 | Status: SHIPPED | OUTPATIENT
Start: 2021-10-28 | End: 2022-03-31

## 2021-10-28 RX ORDER — AMLODIPINE BESYLATE 5 MG/1
5 TABLET ORAL DAILY
Qty: 90 TABLET | Refills: 3 | Status: SHIPPED | OUTPATIENT
Start: 2021-10-28 | End: 2021-11-16

## 2021-11-15 ENCOUNTER — PATIENT MESSAGE (OUTPATIENT)
Dept: FAMILY MEDICINE CLINIC | Facility: CLINIC | Age: 76
End: 2021-11-15

## 2021-11-16 ENCOUNTER — OFFICE VISIT (OUTPATIENT)
Dept: FAMILY MEDICINE CLINIC | Facility: CLINIC | Age: 76
End: 2021-11-16
Payer: MEDICARE

## 2021-11-16 VITALS
SYSTOLIC BLOOD PRESSURE: 140 MMHG | RESPIRATION RATE: 12 BRPM | WEIGHT: 113.4 LBS | HEART RATE: 78 BPM | BODY MASS INDEX: 22.26 KG/M2 | DIASTOLIC BLOOD PRESSURE: 60 MMHG | OXYGEN SATURATION: 99 % | TEMPERATURE: 97.9 F | HEIGHT: 60 IN

## 2021-11-16 DIAGNOSIS — Z11.59 ENCOUNTER FOR SCREENING FOR OTHER VIRAL DISEASES: ICD-10-CM

## 2021-11-16 DIAGNOSIS — R63.4 UNINTENDED WEIGHT LOSS: ICD-10-CM

## 2021-11-16 DIAGNOSIS — N18.31 STAGE 3A CHRONIC KIDNEY DISEASE (HCC): ICD-10-CM

## 2021-11-16 DIAGNOSIS — R74.8 ELEVATED ALKALINE PHOSPHATASE LEVEL: Primary | ICD-10-CM

## 2021-11-16 DIAGNOSIS — M25.561 ACUTE PAIN OF RIGHT KNEE: ICD-10-CM

## 2021-11-16 DIAGNOSIS — R74.01 ELEVATED AST (SGOT): ICD-10-CM

## 2021-11-16 DIAGNOSIS — R74.01 ELEVATED ALT MEASUREMENT: ICD-10-CM

## 2021-11-16 PROCEDURE — 99214 OFFICE O/P EST MOD 30 MIN: CPT | Performed by: FAMILY MEDICINE

## 2021-11-16 RX ORDER — AMLODIPINE BESYLATE 2.5 MG/1
2.5 TABLET ORAL DAILY
COMMUNITY
Start: 2021-11-12

## 2021-11-16 RX ORDER — LISINOPRIL 40 MG/1
40 TABLET ORAL DAILY
COMMUNITY
Start: 2021-11-12

## 2021-11-27 ENCOUNTER — HOSPITAL ENCOUNTER (OUTPATIENT)
Dept: ULTRASOUND IMAGING | Facility: HOSPITAL | Age: 76
Discharge: HOME | End: 2021-11-27
Attending: FAMILY MEDICINE
Payer: MEDICARE

## 2021-11-27 DIAGNOSIS — R74.01 ELEVATED ALT MEASUREMENT: ICD-10-CM

## 2021-11-27 DIAGNOSIS — R74.8 ELEVATED ALKALINE PHOSPHATASE LEVEL: ICD-10-CM

## 2021-11-27 DIAGNOSIS — R74.01 ELEVATED AST (SGOT): ICD-10-CM

## 2021-11-27 PROCEDURE — 76705 ECHO EXAM OF ABDOMEN: CPT

## 2021-12-01 ENCOUNTER — HOSPITAL ENCOUNTER (OUTPATIENT)
Dept: RADIOLOGY | Facility: HOSPITAL | Age: 76
Discharge: HOME | End: 2021-12-01
Payer: MEDICARE

## 2021-12-01 ENCOUNTER — TELEPHONE (OUTPATIENT)
Dept: FAMILY MEDICINE CLINIC | Facility: CLINIC | Age: 76
End: 2021-12-01

## 2021-12-01 DIAGNOSIS — R16.0 LIVER MASS: ICD-10-CM

## 2021-12-01 DIAGNOSIS — R16.0 LIVER MASS: Primary | ICD-10-CM

## 2021-12-01 PROCEDURE — G1004 CDSM NDSC: HCPCS

## 2021-12-01 PROCEDURE — 74178 CT ABD&PLV WO CNTR FLWD CNTR: CPT

## 2021-12-01 RX ADMIN — IOHEXOL 100 ML: 350 INJECTION, SOLUTION INTRAVENOUS at 18:31

## 2021-12-07 ENCOUNTER — TELEPHONE (OUTPATIENT)
Dept: FAMILY MEDICINE CLINIC | Facility: CLINIC | Age: 76
End: 2021-12-07

## 2021-12-09 ENCOUNTER — OFFICE VISIT (OUTPATIENT)
Dept: FAMILY MEDICINE CLINIC | Facility: CLINIC | Age: 76
End: 2021-12-09
Payer: MEDICARE

## 2021-12-09 VITALS
DIASTOLIC BLOOD PRESSURE: 60 MMHG | BODY MASS INDEX: 22.42 KG/M2 | HEIGHT: 60 IN | TEMPERATURE: 97.9 F | RESPIRATION RATE: 12 BRPM | OXYGEN SATURATION: 97 % | SYSTOLIC BLOOD PRESSURE: 158 MMHG | HEART RATE: 71 BPM | WEIGHT: 114.2 LBS

## 2021-12-09 DIAGNOSIS — R74.8 ELEVATED ALKALINE PHOSPHATASE LEVEL: Primary | ICD-10-CM

## 2021-12-09 DIAGNOSIS — R74.8 ELEVATED SERUM GGT LEVEL: ICD-10-CM

## 2021-12-09 DIAGNOSIS — R74.01 ELEVATED AST (SGOT): ICD-10-CM

## 2021-12-09 DIAGNOSIS — R63.4 UNINTENDED WEIGHT LOSS: ICD-10-CM

## 2021-12-09 DIAGNOSIS — R74.01 ELEVATED ALT MEASUREMENT: ICD-10-CM

## 2021-12-09 PROCEDURE — 99214 OFFICE O/P EST MOD 30 MIN: CPT | Performed by: FAMILY MEDICINE

## 2021-12-18 ENCOUNTER — PATIENT MESSAGE (OUTPATIENT)
Dept: FAMILY MEDICINE CLINIC | Facility: CLINIC | Age: 76
End: 2021-12-18

## 2022-01-31 ENCOUNTER — CONSULT (OUTPATIENT)
Dept: GASTROENTEROLOGY | Facility: AMBULARY SURGERY CENTER | Age: 77
End: 2022-01-31
Payer: MEDICARE

## 2022-01-31 VITALS
WEIGHT: 115.2 LBS | DIASTOLIC BLOOD PRESSURE: 86 MMHG | SYSTOLIC BLOOD PRESSURE: 148 MMHG | HEART RATE: 94 BPM | HEIGHT: 59 IN | BODY MASS INDEX: 23.22 KG/M2

## 2022-01-31 DIAGNOSIS — Z12.11 COLON CANCER SCREENING: Primary | ICD-10-CM

## 2022-01-31 DIAGNOSIS — R74.8 ELEVATED ALKALINE PHOSPHATASE LEVEL: ICD-10-CM

## 2022-01-31 DIAGNOSIS — R74.01 ELEVATED ALT MEASUREMENT: ICD-10-CM

## 2022-01-31 DIAGNOSIS — R74.01 ELEVATED AST (SGOT): ICD-10-CM

## 2022-01-31 DIAGNOSIS — R63.4 UNINTENDED WEIGHT LOSS: ICD-10-CM

## 2022-01-31 DIAGNOSIS — R74.8 ELEVATED SERUM GGT LEVEL: ICD-10-CM

## 2022-01-31 DIAGNOSIS — Z11.59 ENCOUNTER FOR SCREENING FOR OTHER VIRAL DISEASES: ICD-10-CM

## 2022-01-31 PROCEDURE — 99204 OFFICE O/P NEW MOD 45 MIN: CPT | Performed by: INTERNAL MEDICINE

## 2022-01-31 NOTE — PROGRESS NOTES
Consultation - 126 [facility]  Fabiano Vora 67 y o  female MRN: [id number]  Unit/Bed#:  Encounter: [id number]        Consults    ASSESSMENT/PLAN:       1  Elevated liver enzymes-mixed pattern, AST of 48, ALT 64 alkaline phosphatase of 160  Albumin and bilirubin are normal   Suspect drug-induced liver injury in setting of statin use versus autoimmune hepatitis versus nonalcoholic fatty liver disease   -check chronic hepatitis panel   -given persistent elevation, would recommend ruling out chronic causes of liver disease such as autoimmune hepatitis, PSC, PBC, celiac disease, Les's disease and alpha 1 antitrypsin deficiency  Would also rule out hereditary hemochromatosis  -if all workup is negative, can carefully continue with statin while monitoring liver enzymes   -discussed avoiding all other herbal supplements  -CT scan recently with contrast was notable for 4 7 cm hepatic hemangioma  Liver ultrasound showed normal echogenicity and smooth surface  -hepatitis-C antibody was negative   -follow the workup is negative, recommend checking hepatitis a total antibody and hepatitis-B surface antibody, if not immune, would recommend vaccination  2  Colon cancer screening-overdue, reports last colonoscopy was 11 years ago, has not had a colonoscopy since then   -Schedule screening colonoscopy  Patient was explained about  the risks and benefits of the procedure  Risks including but not limited to bleeding, infection, perforation were explained in detail  Also explained about less than 100% sensitivity with the exam and other alternatives  3  Unintentional weight loss-reports almost 10 lb weight loss over the past year, reports that she has not had any dietary changes however does report increasing exercise    -would recommend to monitor for ongoing weight loss, in the meantime, would recommend colonoscopy for screening purposes      4  Hemangioma-measuring 4 7 cm, no right-sided abdominal pain  Reports that she has known about this for over 20 years  Given no abdominal pain, will hold off on referral to Surgical Oncology, if there is a growth in the size or patient develops right-sided abdominal pain, would recommend surgical referral       ______________________________________________________________________    Reason for Consult / Principal Problem: [unfilled]    HPI: Mariana Youngblood is a 67y o  year old female with history of hypertension, hyperlipidemia, CVA, who presents for evaluation of elevated liver enzymes  Patient reports that she has only started atorvastatin last year, reports that the dose was a increased to 40 mg  She denies any herbal supplements, no other medications  She denies family history of liver disease  Drinks sparingly, denies any history of IV drug use, no prior history of liver disease  She was noted to have elevated liver enzymes in November subsequently in December  Most recent labs are notable for AST of 48, ALT 64 and alkaline phosphatase of 160  Bilirubin direct is normal, albumin is 3 8, total protein 6 4  Hepatitis-C antibody was negative  GGT was elevated  Her liver enzymes were normal in March of 2021  Patient reports that she is feeling well, denies any jaundice, abdominal distention or peripheral edema  Patient does report that she has lost some weight over the past year and states that she has lost about 10 lb, reports that this is unintentional   She does report that she has been working out more however without making any change to her diet  She also tells me that she was diagnosed with hemangioma almost 20 years ago and knows about that diagnosis  She denies any use of herbal supplements or other medications that are not prescribed  She reports being started on atorvastatin after her stroke last year, no other new medications    She denies knowledge of any liver disease in the past     Denies any upper GI symptoms including heartburn, acid reflux, dysphagia, odynophagia, loss of appetite or early satiety  She states that her last colonoscopy was 11 years ago, has not had 1 since then  No change in bowel habits, hematochezia, abdominal pain  Review of Systems: The remainder of the review of systems was negative except for the pertinent positives noted in HPI  Historical Information   Past Medical History:   Diagnosis Date    Glaucoma     High blood pressure 11/30/2018    High cholesterol     Hypertension     Panic attacks 1998    Papanicolaou smear 03/17/2016    NEG    Post-menopausal     Stroke [facility])      Past Surgical History:   Procedure Laterality Date    COLONOSCOPY  2010    COLONOSCOPY      MAMMO (HISTORICAL) Bilateral 01/21/2019    No evidence of malignancy    TONSILLECTOMY      UPPER GASTROINTESTINAL ENDOSCOPY      VAGINAL DELIVERY      X4     Social History   Social History     Substance and Sexual Activity   Alcohol Use Yes    Comment: social     Social History     Substance and Sexual Activity   Drug Use No     Social History     Tobacco Use   Smoking Status Never Smoker   Smokeless Tobacco Never Used     Family History   Problem Relation Age of Onset    Heart disease Sister     Diabetes Sister     Stroke Sister     Dementia Mother     Ulcerative colitis Son     No Known Problems Daughter     Stroke Maternal Grandmother     Heart failure Maternal Grandfather     Diabetes Paternal Grandmother     No Known Problems Paternal Grandfather     No Known Problems Son     No Known Problems Daughter        Meds/Allergies     (Not in a hospital admission)    No current facility-administered medications for this visit  Allergies   Allergen Reactions    Codeine     Sulfa Antibiotics        Objective     Blood pressure 148/86, pulse 94, height 4' 11" (1 499 m), weight 52 3 kg (115 lb 3 2 oz), not currently breastfeeding      [unfilled]    PHYSICAL EXAM     GEN: well nourished, well developed, no acute distress  HEENT: anicteric, MMM, no cervical or supraclavicular lymphadenopathy  CV: RRR, no m/r/g  CHEST: CTA b/l, no WRR  ABD: +BS, soft, NT/ND, no hepatosplenomegaly  EXT: no c/c/e  SKIN: no rashes,  NEURO: aaox3    Lab Results:   No visits with results within 1 Day(s) from this visit     Latest known visit with results is:   Orders Only on 12/07/2021   Component Date Value    HEP C AB 12/07/2021 negative      Imaging Studies: I have personally reviewed pertinent films in PACS

## 2022-01-31 NOTE — PATIENT INSTRUCTIONS
Scheduled date of colonoscopy (as of today): 5/16/2022  Physician performing colonoscopy:DR VILLARREAL  Location of colonoscopy:[facility]  Bowel prep reviewed with patient:MIRALAX/MAG CITRATE PER DR BORGES  Instructions reviewed with patient by:JALYN ACEVEDO  Clearances:

## 2022-01-31 NOTE — LETTER
January 31, 2022     DO [address]  [address]  [facility]    Patient: Ilene Crockett   YOB: 1945   Date of Visit: 1/31/2022       Dear Dr Vaishnavi Gates: Thank you for referring Marci Crespo to me for evaluation  Below are my notes for this consultation  If you have questions, please do not hesitate to call me  I look forward to following your patient along with you  Sincerely,        Salvador Velez MD        CC: No Recipients  Salvador Velez MD  1/31/2022  5:45 PM  Sign when Signing Visit  Consultation - 126 [facility]  Ilene Crockett 67 y o  female MRN: [id number]  Unit/Bed#:  Encounter: [id number]        Consults    ASSESSMENT/PLAN:       1  Elevated liver enzymes-mixed pattern, AST of 48, ALT 64 alkaline phosphatase of 160  Albumin and bilirubin are normal   Suspect drug-induced liver injury in setting of statin use versus autoimmune hepatitis versus nonalcoholic fatty liver disease   -check chronic hepatitis panel   -given persistent elevation, would recommend ruling out chronic causes of liver disease such as autoimmune hepatitis, PSC, PBC, celiac disease, Les's disease and alpha 1 antitrypsin deficiency  Would also rule out hereditary hemochromatosis  -if all workup is negative, can carefully continue with statin while monitoring liver enzymes   -discussed avoiding all other herbal supplements  -CT scan recently with contrast was notable for 4 7 cm hepatic hemangioma  Liver ultrasound showed normal echogenicity and smooth surface  -hepatitis-C antibody was negative   -follow the workup is negative, recommend checking hepatitis a total antibody and hepatitis-B surface antibody, if not immune, would recommend vaccination  2  Colon cancer screening-overdue, reports last colonoscopy was 11 years ago, has not had a colonoscopy since then   -Schedule screening colonoscopy       Patient was explained about  the risks and benefits of the procedure  Risks including but not limited to bleeding, infection, perforation were explained in detail  Also explained about less than 100% sensitivity with the exam and other alternatives  3  Unintentional weight loss-reports almost 10 lb weight loss over the past year, reports that she has not had any dietary changes however does report increasing exercise    -would recommend to monitor for ongoing weight loss, in the meantime, would recommend colonoscopy for screening purposes  4  Hemangioma-measuring 4 7 cm, no right-sided abdominal pain  Reports that she has known about this for over 20 years  Given no abdominal pain, will hold off on referral to Surgical Oncology, if there is a growth in the size or patient develops right-sided abdominal pain, would recommend surgical referral       ______________________________________________________________________    Reason for Consult / Principal Problem: [unfilled]    HPI: Danny Zeng is a 67y o  year old female with history of hypertension, hyperlipidemia, CVA, who presents for evaluation of elevated liver enzymes  Patient reports that she has only started atorvastatin last year, reports that the dose was a increased to 40 mg  She denies any herbal supplements, no other medications  She denies family history of liver disease  Drinks sparingly, denies any history of IV drug use, no prior history of liver disease  She was noted to have elevated liver enzymes in November subsequently in December  Most recent labs are notable for AST of 48, ALT 64 and alkaline phosphatase of 160  Bilirubin direct is normal, albumin is 3 8, total protein 6 4  Hepatitis-C antibody was negative  GGT was elevated  Her liver enzymes were normal in March of 2021  Patient reports that she is feeling well, denies any jaundice, abdominal distention or peripheral edema    Patient does report that she has lost some weight over the past year and states that she has lost about 10 lb, reports that this is unintentional   She does report that she has been working out more however without making any change to her diet  She also tells me that she was diagnosed with hemangioma almost 20 years ago and knows about that diagnosis  She denies any use of herbal supplements or other medications that are not prescribed  She reports being started on atorvastatin after her stroke last year, no other new medications  She denies knowledge of any liver disease in the past     Denies any upper GI symptoms including heartburn, acid reflux, dysphagia, odynophagia, loss of appetite or early satiety  She states that her last colonoscopy was 11 years ago, has not had 1 since then  No change in bowel habits, hematochezia, abdominal pain  Review of Systems: The remainder of the review of systems was negative except for the pertinent positives noted in HPI       Historical Information   Past Medical History:   Diagnosis Date    Glaucoma     High blood pressure 11/30/2018    High cholesterol     Hypertension     Panic attacks 1998    Papanicolaou smear 03/17/2016    NEG    Post-menopausal     Stroke [facility])      Past Surgical History:   Procedure Laterality Date    COLONOSCOPY  2010    COLONOSCOPY      MAMMO (HISTORICAL) Bilateral 01/21/2019    No evidence of malignancy    TONSILLECTOMY      UPPER GASTROINTESTINAL ENDOSCOPY      VAGINAL DELIVERY      X4     Social History   Social History     Substance and Sexual Activity   Alcohol Use Yes    Comment: social     Social History     Substance and Sexual Activity   Drug Use No     Social History     Tobacco Use   Smoking Status Never Smoker   Smokeless Tobacco Never Used     Family History   Problem Relation Age of Onset    Heart disease Sister     Diabetes Sister     Stroke Sister     Dementia Mother     Ulcerative colitis Son     No Known Problems Daughter     Stroke Maternal Grandmother  Heart failure Maternal Grandfather     Diabetes Paternal Grandmother     No Known Problems Paternal Grandfather     No Known Problems Son     No Known Problems Daughter        Meds/Allergies     (Not in a hospital admission)    No current facility-administered medications for this visit  Allergies   Allergen Reactions    Codeine     Sulfa Antibiotics        Objective     Blood pressure 148/86, pulse 94, height 4' 11" (1 499 m), weight 52 3 kg (115 lb 3 2 oz), not currently breastfeeding  [unfilled]    PHYSICAL EXAM     GEN: well nourished, well developed, no acute distress  HEENT: anicteric, MMM, no cervical or supraclavicular lymphadenopathy  CV: RRR, no m/r/g  CHEST: CTA b/l, no WRR  ABD: +BS, soft, NT/ND, no hepatosplenomegaly  EXT: no c/c/e  SKIN: no rashes,  NEURO: aaox3    Lab Results:   No visits with results within 1 Day(s) from this visit     Latest known visit with results is:   Orders Only on 12/07/2021   Component Date Value    HEP C AB 12/07/2021 negative      Imaging Studies: I have personally reviewed pertinent films in PACS

## 2022-01-31 NOTE — LETTER
January 31, 2022     [address]  [address]  [facility]    Patient: Radha Loving   YOB: 1945   Date of Visit: 1/31/2022       Dear Dr Sanaz Crane: Thank you for referring Margaret Booth to me for evaluation  Below are my notes for this consultation  If you have questions, please do not hesitate to call me  I look forward to following your patient along with you  Sincerely,        Fozia Waters MD        CC: No Recipients  Fozia Waters MD  1/31/2022  5:43 PM  Incomplete  Consultation - [address] [facility]  Radha Loving 67 y o  female MRN: [id number]  Unit/Bed#:  Encounter: [id number]        Consults    ASSESSMENT/PLAN: ***      1  Elevated liver enzymes-mixed pattern, AST of 48, ALT 64 alkaline phosphatase of 160  Albumin and bilirubin are normal   Suspect drug-induced liver injury in setting of statin use versus autoimmune hepatitis versus nonalcoholic fatty liver disease   -check chronic hepatitis panel   -given persistent elevation, would recommend ruling out chronic causes of liver disease such as autoimmune hepatitis, PSC, PBC, celiac disease, Les's disease and alpha 1 antitrypsin deficiency  Would also rule out hereditary hemochromatosis  -if all workup is negative, can carefully continue with statin while monitoring liver enzymes   -discussed avoiding all other herbal supplements  -CT scan recently with contrast was notable for 4 7 cm hepatic hemangioma  Liver ultrasound showed normal echogenicity and smooth surface  -hepatitis-C antibody was negative   -follow the workup is negative, recommend checking hepatitis a total antibody and hepatitis-B surface antibody, if not immune, would recommend vaccination  2  Colon cancer screening-overdue, reports last colonoscopy was 11 years ago, has not had a colonoscopy since then   -Schedule screening colonoscopy       Patient was explained about  the risks and benefits of the procedure  Risks including but not limited to bleeding, infection, perforation were explained in detail  Also explained about less than 100% sensitivity with the exam and other alternatives  3  Unintentional weight loss-reports almost 10 lb weight loss over the past year, reports that she has not had any dietary changes however does report increasing exercise    -would recommend to monitor for ongoing weight loss, in the meantime, would recommend colonoscopy for screening purposes  4  Hemangioma-measuring 4 7 cm, no right-sided abdominal pain  Reports that she has known about this for over 20 years  Given no abdominal pain, will hold off on referral to Surgical Oncology, if there is a growth in the size or patient develops right-sided abdominal pain, would recommend surgical referral       ______________________________________________________________________    Reason for Consult / Principal Problem: [unfilled]    HPI: Fabiano Vora is a 67y o  year old female with history of hypertension, hyperlipidemia, CVA, who presents for evaluation of elevated liver enzymes  Patient reports that she has only started atorvastatin last year, reports that the dose was a increased to 40 mg  She denies any herbal supplements, no other medications  She denies family history of liver disease  Drinks sparingly, denies any history of IV drug use, no prior history of liver disease  She was noted to have elevated liver enzymes in November subsequently in December  Most recent labs are notable for AST of 48, ALT 64 and alkaline phosphatase of 160  Bilirubin direct is normal, albumin is 3 8, total protein 6 4  Hepatitis-C antibody was negative  GGT was elevated  Her liver enzymes were normal in March of 2021  Review of Systems: The remainder of the review of systems was negative except for the pertinent positives noted in HPI       Historical Information   Past Medical History: Diagnosis Date    Glaucoma     High blood pressure 11/30/2018    High cholesterol     Hypertension     Panic attacks 1998    Papanicolaou smear 03/17/2016    NEG    Post-menopausal     Stroke [facility])      Past Surgical History:   Procedure Laterality Date    COLONOSCOPY  2010    COLONOSCOPY      MAMMO (HISTORICAL) Bilateral 01/21/2019    No evidence of malignancy    TONSILLECTOMY      UPPER GASTROINTESTINAL ENDOSCOPY      VAGINAL DELIVERY      X4     Social History   Social History     Substance and Sexual Activity   Alcohol Use Yes    Comment: social     Social History     Substance and Sexual Activity   Drug Use No     Social History     Tobacco Use   Smoking Status Never Smoker   Smokeless Tobacco Never Used     Family History   Problem Relation Age of Onset    Heart disease Sister     Diabetes Sister     Stroke Sister     Dementia Mother     Ulcerative colitis Son     No Known Problems Daughter     Stroke Maternal Grandmother     Heart failure Maternal Grandfather     Diabetes Paternal Grandmother     No Known Problems Paternal Grandfather     No Known Problems Son     No Known Problems Daughter        Meds/Allergies     (Not in a hospital admission)    No current facility-administered medications for this visit  Allergies   Allergen Reactions    Codeine     Sulfa Antibiotics        Objective     Blood pressure 148/86, pulse 94, height 4' 11" (1 499 m), weight 52 3 kg (115 lb 3 2 oz), not currently breastfeeding  [unfilled]    PHYSICAL EXAM     GEN: well nourished, well developed, no acute distress  HEENT: anicteric, MMM, no cervical or supraclavicular lymphadenopathy  CV: RRR, no m/r/g  CHEST: CTA b/l, no WRR  ABD: +BS, soft, NT/ND, no hepatosplenomegaly  EXT: no c/c/e  SKIN: no rashes,  NEURO: aaox3    Lab Results:   No visits with results within 1 Day(s) from this visit     Latest known visit with results is:   Orders Only on 12/07/2021   Component Date Value    HEP C AB 12/07/2021 negative      Imaging Studies: {Results Review Statement:18188}                  Liz Arriaga MD  1/31/2022  5:38 PM  Sign when Signing Visit  Consultation - [facility]  Kassandra Amado 67 y o  female MRN: [id number]  Unit/Bed#:  Encounter: [id number]        Consults    ASSESSMENT/PLAN: ***      1  Elevated liver enzymes-mixed pattern, AST of 48, ALT 64 alkaline phosphatase of 160  Albumin and bilirubin are normal   Suspect drug-induced liver injury in setting of statin use versus less likely nonalcoholic fatty liver disease   -check chronic hepatitis panel   -given persistent elevation, would recommend ruling out chronic causes of liver disease such as autoimmune hepatitis, PSC, PBC, celiac disease, Les's disease and alpha 1 antitrypsin deficiency  Would also rule out hereditary hemochromatosis  -if all workup is negative, can carefully continue with statin while monitoring liver enzymes   -discussed avoiding all other herbal supplements  -CT scan recently with contrast was notable for 4 7 cm hepatic hemangioma  Liver ultrasound showed normal echogenicity and smooth surface  -hepatitis-C antibody was negative   -follow the workup is negative, recommend checking hepatitis a total antibody and hepatitis-B surface antibody, if not immune, would recommend vaccination  2  Colon cancer screening-overdue, reports last colonoscopy was 11 years ago, has not had a colonoscopy since then   -Schedule screening colonoscopy        ______________________________________________________________________    Reason for Consult / Principal Problem: [unfilled]    HPI: Kassandra Amado is a 67y o  year old female with history of hypertension, hyperlipidemia, CVA, who presents for evaluation of elevated liver enzymes  Patient reports that she has only started atorvastatin last year, reports that the dose was a increased to 40 mg   She denies any herbal supplements, no other medications  She denies family history of liver disease  Drinks sparingly, denies any history of IV drug use, no prior history of liver disease  She was noted to have elevated liver enzymes in November subsequently in December  Most recent labs are notable for AST of 48, ALT 64 and alkaline phosphatase of 160  Bilirubin direct is normal, albumin is 3 8, total protein 6 4  Hepatitis-C antibody was negative  GGT was elevated  Her liver enzymes were normal in March of 2021  Review of Systems: The remainder of the review of systems was negative except for the pertinent positives noted in HPI  Historical Information   Past Medical History:   Diagnosis Date    Glaucoma     High blood pressure 11/30/2018    High cholesterol     Hypertension     Panic attacks 1998    Papanicolaou smear 03/17/2016    NEG    Post-menopausal     Stroke [facility])      Past Surgical History:   Procedure Laterality Date    COLONOSCOPY  2010    COLONOSCOPY      MAMMO (HISTORICAL) Bilateral 01/21/2019    No evidence of malignancy    TONSILLECTOMY      UPPER GASTROINTESTINAL ENDOSCOPY      VAGINAL DELIVERY      X4     Social History   Social History     Substance and Sexual Activity   Alcohol Use Yes    Comment: social     Social History     Substance and Sexual Activity   Drug Use No     Social History     Tobacco Use   Smoking Status Never Smoker   Smokeless Tobacco Never Used     Family History   Problem Relation Age of Onset    Heart disease Sister     Diabetes Sister     Stroke Sister     Dementia Mother     Ulcerative colitis Son     No Known Problems Daughter     Stroke Maternal Grandmother     Heart failure Maternal Grandfather     Diabetes Paternal Grandmother     No Known Problems Paternal Grandfather     No Known Problems Son     No Known Problems Daughter        Meds/Allergies     (Not in a hospital admission)    No current facility-administered medications for this visit         Allergies Allergen Reactions    Codeine     Sulfa Antibiotics        Objective     Blood pressure 148/86, pulse 94, height 4' 11" (1 499 m), weight 52 3 kg (115 lb 3 2 oz), not currently breastfeeding  [unfilled]    PHYSICAL EXAM     GEN: well nourished, well developed, no acute distress  HEENT: anicteric, MMM, no cervical or supraclavicular lymphadenopathy  CV: RRR, no m/r/g  CHEST: CTA b/l, no WRR  ABD: +BS, soft, NT/ND, no hepatosplenomegaly  EXT: no c/c/e  SKIN: no rashes,  NEURO: aaox3    Lab Results:   No visits with results within 1 Day(s) from this visit     Latest known visit with results is:   Orders Only on 12/07/2021   Component Date Value    HEP C AB 12/07/2021 negative      Imaging Studies: {Results Review Statement:45592}

## 2022-03-27 ENCOUNTER — APPOINTMENT (EMERGENCY)
Dept: RADIOLOGY | Facility: HOSPITAL | Age: 77
End: 2022-03-27
Payer: MEDICARE

## 2022-03-27 ENCOUNTER — HOSPITAL ENCOUNTER (EMERGENCY)
Facility: HOSPITAL | Age: 77
Discharge: HOME | End: 2022-03-27
Attending: EMERGENCY MEDICINE | Admitting: EMERGENCY MEDICINE
Payer: MEDICARE

## 2022-03-27 ENCOUNTER — APPOINTMENT (EMERGENCY)
Dept: CT IMAGING | Facility: HOSPITAL | Age: 77
End: 2022-03-27
Payer: MEDICARE

## 2022-03-27 VITALS
DIASTOLIC BLOOD PRESSURE: 63 MMHG | RESPIRATION RATE: 18 BRPM | TEMPERATURE: 98.1 F | BODY MASS INDEX: 22.22 KG/M2 | HEART RATE: 60 BPM | SYSTOLIC BLOOD PRESSURE: 141 MMHG | OXYGEN SATURATION: 97 % | WEIGHT: 110 LBS

## 2022-03-27 DIAGNOSIS — I10 HYPERTENSION: Primary | ICD-10-CM

## 2022-03-27 LAB
2HR DELTA HS TROPONIN: 1 NG/L
ALBUMIN SERPL BCP-MCNC: 4.2 G/DL (ref 3.5–5)
ALP SERPL-CCNC: 114 U/L (ref 46–116)
ALT SERPL W P-5'-P-CCNC: 48 U/L (ref 12–78)
ANION GAP SERPL CALCULATED.3IONS-SCNC: 9 MMOL/L (ref 4–13)
AST SERPL W P-5'-P-CCNC: 37 U/L (ref 5–45)
ATRIAL RATE: 63 BPM
ATRIAL RATE: 64 BPM
ATRIAL RATE: 64 BPM
BASOPHILS # BLD AUTO: 0.02 THOUSANDS/ΜL (ref 0–0.1)
BASOPHILS NFR BLD AUTO: 0 % (ref 0–1)
BILIRUB SERPL-MCNC: 0.82 MG/DL (ref 0.2–1)
BUN SERPL-MCNC: 21 MG/DL (ref 5–25)
CALCIUM SERPL-MCNC: 9.7 MG/DL (ref 8.3–10.1)
CARDIAC TROPONIN I PNL SERPL HS: 10 NG/L
CARDIAC TROPONIN I PNL SERPL HS: 9 NG/L
CHLORIDE SERPL-SCNC: 105 MMOL/L (ref 100–108)
CO2 SERPL-SCNC: 28 MMOL/L (ref 21–32)
CREAT SERPL-MCNC: 0.94 MG/DL (ref 0.6–1.3)
EOSINOPHIL # BLD AUTO: 0.04 THOUSAND/ΜL (ref 0–0.61)
EOSINOPHIL NFR BLD AUTO: 1 % (ref 0–6)
ERYTHROCYTE [DISTWIDTH] IN BLOOD BY AUTOMATED COUNT: 12.9 % (ref 11.6–15.1)
GFR SERPL CREATININE-BSD FRML MDRD: 58 ML/MIN/1.73SQ M
GLUCOSE SERPL-MCNC: 104 MG/DL (ref 65–140)
HCT VFR BLD AUTO: 44.5 % (ref 34.8–46.1)
HGB BLD-MCNC: 14.2 G/DL (ref 11.5–15.4)
IMM GRANULOCYTES # BLD AUTO: 0.01 THOUSAND/UL (ref 0–0.2)
IMM GRANULOCYTES NFR BLD AUTO: 0 % (ref 0–2)
LYMPHOCYTES # BLD AUTO: 1.54 THOUSANDS/ΜL (ref 0.6–4.47)
LYMPHOCYTES NFR BLD AUTO: 23 % (ref 14–44)
MCH RBC QN AUTO: 30.1 PG (ref 26.8–34.3)
MCHC RBC AUTO-ENTMCNC: 31.9 G/DL (ref 31.4–37.4)
MCV RBC AUTO: 95 FL (ref 82–98)
MONOCYTES # BLD AUTO: 0.68 THOUSAND/ΜL (ref 0.17–1.22)
MONOCYTES NFR BLD AUTO: 10 % (ref 4–12)
NEUTROPHILS # BLD AUTO: 4.3 THOUSANDS/ΜL (ref 1.85–7.62)
NEUTS SEG NFR BLD AUTO: 66 % (ref 43–75)
NRBC BLD AUTO-RTO: 0 /100 WBCS
P AXIS: 119 DEGREES
P AXIS: 73 DEGREES
P AXIS: 74 DEGREES
PLATELET # BLD AUTO: 212 THOUSANDS/UL (ref 149–390)
PMV BLD AUTO: 11.2 FL (ref 8.9–12.7)
POTASSIUM SERPL-SCNC: 4.1 MMOL/L (ref 3.5–5.3)
PR INTERVAL: 152 MS
PR INTERVAL: 168 MS
PR INTERVAL: 184 MS
PROT SERPL-MCNC: 7.5 G/DL (ref 6.4–8.2)
QRS AXIS: -7 DEGREES
QRS AXIS: 186 DEGREES
QRS AXIS: 4 DEGREES
QRSD INTERVAL: 112 MS
QRSD INTERVAL: 116 MS
QRSD INTERVAL: 122 MS
QT INTERVAL: 400 MS
QT INTERVAL: 408 MS
QT INTERVAL: 414 MS
QTC INTERVAL: 407 MS
QTC INTERVAL: 411 MS
QTC INTERVAL: 414 MS
RBC # BLD AUTO: 4.71 MILLION/UL (ref 3.81–5.12)
SODIUM SERPL-SCNC: 142 MMOL/L (ref 136–145)
T WAVE AXIS: 16 DEGREES
T WAVE AXIS: 163 DEGREES
T WAVE AXIS: 34 DEGREES
VENTRICULAR RATE: 60 BPM
VENTRICULAR RATE: 61 BPM
VENTRICULAR RATE: 62 BPM
WBC # BLD AUTO: 6.59 THOUSAND/UL (ref 4.31–10.16)

## 2022-03-27 PROCEDURE — 80053 COMPREHEN METABOLIC PANEL: CPT | Performed by: EMERGENCY MEDICINE

## 2022-03-27 PROCEDURE — 93010 ELECTROCARDIOGRAM REPORT: CPT | Performed by: INTERNAL MEDICINE

## 2022-03-27 PROCEDURE — 71045 X-RAY EXAM CHEST 1 VIEW: CPT

## 2022-03-27 PROCEDURE — 93005 ELECTROCARDIOGRAM TRACING: CPT

## 2022-03-27 PROCEDURE — 85025 COMPLETE CBC W/AUTO DIFF WBC: CPT | Performed by: EMERGENCY MEDICINE

## 2022-03-27 PROCEDURE — G1004 CDSM NDSC: HCPCS

## 2022-03-27 PROCEDURE — 99285 EMERGENCY DEPT VISIT HI MDM: CPT | Performed by: EMERGENCY MEDICINE

## 2022-03-27 PROCEDURE — 96374 THER/PROPH/DIAG INJ IV PUSH: CPT

## 2022-03-27 PROCEDURE — 84484 ASSAY OF TROPONIN QUANT: CPT | Performed by: EMERGENCY MEDICINE

## 2022-03-27 PROCEDURE — 36415 COLL VENOUS BLD VENIPUNCTURE: CPT | Performed by: EMERGENCY MEDICINE

## 2022-03-27 PROCEDURE — 99284 EMERGENCY DEPT VISIT MOD MDM: CPT

## 2022-03-27 PROCEDURE — 70450 CT HEAD/BRAIN W/O DYE: CPT

## 2022-03-27 RX ORDER — LABETALOL 20 MG/4 ML (5 MG/ML) INTRAVENOUS SYRINGE
10 ONCE
Status: COMPLETED | OUTPATIENT
Start: 2022-03-27 | End: 2022-03-27

## 2022-03-27 RX ADMIN — LABETALOL HYDROCHLORIDE 10 MG: 5 INJECTION, SOLUTION INTRAVENOUS at 10:48

## 2022-03-27 NOTE — DISCHARGE INSTRUCTIONS
Please follow-up with family doctor    Return to ER if having any new concerning symptoms including weakness, chest pain, shortness of breath, headache, dizziness, lightheadedness or feel worse overall

## 2022-03-27 NOTE — ED PROVIDER NOTES
History  Chief Complaint   Patient presents with    Hypertension     /79 at home today       History provided by:  Patient   used: No    Hypertension  Associated symptoms: no abdominal pain, no chest pain, no dizziness, no fever, no headaches, no nausea, no neck pain, no palpitations, no shortness of breath, not vomiting and no weakness      Patient is a 70-year-old female presenting to emergency department with elevated blood pressure at home  States her blood pressure is usually in the 140s but was 190/79  She has history of strokes  Was concerned and came to the ER  States had fullness in the head  No headache  No neck pain  No weakness numbness or tingling  No speech changes  No chest pain  No shortness of breath  No nausea vomiting  No abdominal pain  No back pain  MDM will check for end-organ damage from elevated blood pressure, check EKG, troponin, creat, CT head, treat blood pressure      Prior to Admission Medications   Prescriptions Last Dose Informant Patient Reported? Taking?    Cholecalciferol (Vitamin D3) 50 MCG (2000 UT) capsule  Self Yes No   Sig: Take 2,000 Units by mouth daily    amLODIPine (NORVASC) 2 5 mg tablet  Self Yes No   Sig: Take 2 5 mg by mouth daily   atorvastatin (LIPITOR) 40 mg tablet  Self No No   Sig: Take 1 tablet (40 mg total) by mouth daily   lisinopril (ZESTRIL) 20 mg tablet  Self No No   Sig: Take 1 tablet (20 mg total) by mouth daily   Patient taking differently: Take 20 mg by mouth 2 (two) times a day     lisinopril (ZESTRIL) 40 mg tablet  Self Yes No   Sig: Take 40 mg by mouth daily   prednisoLONE acetate (PRED FORTE) 1 % ophthalmic suspension  Self Yes No   Sig: INSTILL 1 DROP IN THE RIGHT EYE EVERY OTHER DAY      Facility-Administered Medications: None       Past Medical History:   Diagnosis Date    Glaucoma     High blood pressure 11/30/2018    High cholesterol     Hypertension     Panic attacks 1998    Papanicolaou smear 03/17/2016    NEG    Post-menopausal     Stroke [facility])        Past Surgical History:   Procedure Laterality Date    COLONOSCOPY  2010    COLONOSCOPY      MAMMO (HISTORICAL) Bilateral 01/21/2019    No evidence of malignancy    TONSILLECTOMY      UPPER GASTROINTESTINAL ENDOSCOPY      VAGINAL DELIVERY      X4       Family History   Problem Relation Age of Onset    Heart disease Sister     Diabetes Sister     Stroke Sister     Dementia Mother     Ulcerative colitis Son     No Known Problems Daughter     Stroke Maternal Grandmother     Heart failure Maternal Grandfather     Diabetes Paternal Grandmother     No Known Problems Paternal Grandfather     No Known Problems Son     No Known Problems Daughter      I have reviewed and agree with the history as documented  E-Cigarette/Vaping    E-Cigarette Use Never User      E-Cigarette/Vaping Substances    Nicotine No     THC No     CBD No     Flavoring No     Other No     Unknown No      Social History     Tobacco Use    Smoking status: Never Smoker    Smokeless tobacco: Never Used   Vaping Use    Vaping Use: Never used   Substance Use Topics    Alcohol use: Yes     Comment: social    Drug use: No       Review of Systems   Constitutional: Negative for chills, diaphoresis and fever  HENT: Negative for congestion and sore throat  Respiratory: Negative for cough, shortness of breath, wheezing and stridor  Cardiovascular: Negative for chest pain, palpitations and leg swelling  Gastrointestinal: Negative for abdominal pain, blood in stool, diarrhea, nausea and vomiting  Genitourinary: Negative for dysuria, frequency and urgency  Musculoskeletal: Negative for neck pain and neck stiffness  Skin: Negative for pallor and rash  Neurological: Negative for dizziness, syncope, weakness, light-headedness and headaches  All other systems reviewed and are negative  Physical Exam  Physical Exam  Vitals reviewed     Constitutional: Appearance: Normal appearance  She is well-developed  HENT:      Head: Normocephalic and atraumatic  Eyes:      Extraocular Movements: Extraocular movements intact  Pupils: Pupils are equal, round, and reactive to light  Cardiovascular:      Rate and Rhythm: Normal rate and regular rhythm  Heart sounds: Normal heart sounds  Pulmonary:      Effort: Pulmonary effort is normal  No respiratory distress  Breath sounds: Normal breath sounds  Abdominal:      General: Bowel sounds are normal       Palpations: Abdomen is soft  Tenderness: There is no abdominal tenderness  Musculoskeletal:         General: No swelling or tenderness  Normal range of motion  Cervical back: Normal range of motion and neck supple  Skin:     General: Skin is warm and dry  Capillary Refill: Capillary refill takes less than 2 seconds  Neurological:      General: No focal deficit present  Mental Status: She is alert and oriented to person, place, and time  Cranial Nerves: No cranial nerve deficit  Sensory: No sensory deficit  Motor: No weakness        Coordination: Coordination normal       Gait: Gait normal          Vital Signs  ED Triage Vitals   Temperature Pulse Respirations Blood Pressure SpO2   03/27/22 0940 03/27/22 0940 03/27/22 0940 03/27/22 0940 03/27/22 0940   98 1 °F (36 7 °C) 81 18 (!) 223/90 99 %      Temp Source Heart Rate Source Patient Position - Orthostatic VS BP Location FiO2 (%)   03/27/22 0940 03/27/22 0940 03/27/22 1115 03/27/22 1115 --   Oral Monitor Lying Left arm       Pain Score       03/27/22 0940       No Pain           Vitals:    03/27/22 1130 03/27/22 1230 03/27/22 1330 03/27/22 1345   BP: 161/67 139/63 146/64 141/63   Pulse: 62 66 60 60   Patient Position - Orthostatic VS: Lying Lying Lying Lying         Visual Acuity      ED Medications  Medications   Labetalol HCl (NORMODYNE) injection 10 mg (10 mg Intravenous Given 3/27/22 1048)       Diagnostic Studies  Results Reviewed     Procedure Component Value Units Date/Time    HS Troponin I 2hr [[id number]]  (Normal) Collected: 03/27/22 1248    Lab Status: Final result Specimen: Blood from Arm, Right Updated: 03/27/22 1336     hs TnI 2hr 10 ng/L      Delta 2hr hsTnI 1 ng/L     HS Troponin I 4hr [[id number]]     Lab Status: No result Specimen: Blood     HS Troponin 0hr (reflex protocol) [[id number]]  (Normal) Collected: 03/27/22 1048    Lab Status: Final result Specimen: Blood from Arm, Right Updated: 03/27/22 1147     hs TnI 0hr 9 ng/L     Comprehensive metabolic panel [[id number]] Collected: 03/27/22 1048    Lab Status: Final result Specimen: Blood from Arm, Right Updated: 03/27/22 1141     Sodium 142 mmol/L      Potassium 4 1 mmol/L      Chloride 105 mmol/L      CO2 28 mmol/L      ANION GAP 9 mmol/L      BUN 21 mg/dL      Creatinine 0 94 mg/dL      Glucose 104 mg/dL      Calcium 9 7 mg/dL      AST 37 U/L      ALT 48 U/L      Alkaline Phosphatase 114 U/L      Total Protein 7 5 g/dL      Albumin 4 2 g/dL      Total Bilirubin 0 82 mg/dL      eGFR 58 ml/min/1 73sq m     Narrative:      [place] guidelines for Chronic Kidney Disease (CKD):     Stage 1 with normal or high GFR (GFR > 90 mL/min/1 73 square meters)    Stage 2 Mild CKD (GFR = 60-89 mL/min/1 73 square meters)    Stage 3A Moderate CKD (GFR = 45-59 mL/min/1 73 square meters)    Stage 3B Moderate CKD (GFR = 30-44 mL/min/1 73 square meters)    Stage 4 Severe CKD (GFR = 15-29 mL/min/1 73 square meters)    Stage 5 End Stage CKD (GFR <15 mL/min/1 73 square meters)  Note: GFR calculation is accurate only with a steady state creatinine    CBC and differential [[id number]] Collected: 03/27/22 1048    Lab Status: Final result Specimen: Blood from Arm, Right Updated: 03/27/22 1107     WBC 6 59 Thousand/uL      RBC 4 71 Million/uL      Hemoglobin 14 2 g/dL      Hematocrit 44 5 %      MCV 95 fL      MCH 30 1 pg      MCHC 31 9 g/dL      RDW 12 9 %      MPV 11 2 fL      Platelets 538 Thousands/uL      nRBC 0 /100 WBCs      Neutrophils Relative 66 %      Immat GRANS % 0 %      Lymphocytes Relative 23 %      Monocytes Relative 10 %      Eosinophils Relative 1 %      Basophils Relative 0 %      Neutrophils Absolute 4 30 Thousands/µL      Immature Grans Absolute 0 01 Thousand/uL      Lymphocytes Absolute 1 54 Thousands/µL      Monocytes Absolute 0 68 Thousand/µL      Eosinophils Absolute 0 04 Thousand/µL      Basophils Absolute 0 02 Thousands/µL                  XR chest 1 view portable   ED Interpretation by Oliver Noel MD (03/27 1042)   No acute abnormality      Final Result by Jewels Linda MD (03/27 1237)      No acute cardiopulmonary disease  Workstation performed: [id number]         CT head without contrast   Final Result by Dee Josue DO (03/27 1047)   Stable cerebral atrophy with chronic small vessel ischemic white matter disease  No acute intracranial abnormality  Workstation performed: [id number]                    Procedures  Procedures         ED Course  ED Course as of 03/27/22 1512   Sun Mar 27, 2022   1024 ECG shows rate of 60, sinus, normal axis, normal QRS, right bundle-branch block, nonspecific ST and T-waves throughout, no significant changes from previous EKG, independently interpreted by me          ECG was repeated without significant changes, right bundle-branch block, sinus, normal axis, nonspecific ST and T-waves  SBIRT 22yo+      Most Recent Value   SBIRT (24 yo +)    In order to provide better care to our patients, we are screening all of our patients for alcohol and drug use  Would it be okay to ask you these screening questions? Yes Filed at: 03/27/2022 1135   Initial Alcohol Screen: US AUDIT-C     1  How often do you have a drink containing alcohol? 0 Filed at: 03/27/2022 1135   2   How many drinks containing alcohol do you have on a typical day you are drinking? 0 Filed at: 03/27/2022 1135   3b  FEMALE Any Age, or MALE 65+: How often do you have 4 or more drinks on one occassion? 0 Filed at: 03/27/2022 1135   Audit-C Score 0 Filed at: 03/27/2022 1135   ZARINA: How many times in the past year have you    Used an illegal drug or used a prescription medication for non-medical reasons? Never Filed at: 03/27/2022 1135                    MDM     Patient advised to follow-up with family doctor  Will call PCP tomorrow  Return if any new or concerning symptoms including chest pain, trouble breathing, headache, lightheadedness, dizziness headache, numbness or feel worse  Disposition  Final diagnoses:   Hypertension     Time reflects when diagnosis was documented in both MDM as applicable and the Disposition within this note     Time User Action Codes Description Comment    3/27/2022  1:48 PM Misa Thompson Add [I10] Hypertension       ED Disposition     ED Disposition Condition Date/Time Comment    Discharge Stable Sun Mar 27, 2022  1:48 PM Roxana Figueroa discharge to home/self care  Follow-up Information     Follow up With Specialties Details Why Jyothi, DO Family Medicine In 3 days Re-evaluation Charlie Hernandez [address]    [address] [facility] Emergency Department Emergency Medicine  As needed, If symptoms worsen [address] [facility] [address] [place] [address] Emergency Department,  [address], [place], [place], [address]          Discharge Medication List as of 3/27/2022  1:49 PM      CONTINUE these medications which have NOT CHANGED    Details   amLODIPine (NORVASC) 2 5 mg tablet Take 2 5 mg by mouth daily, Starting Fri 11/12/2021, Historical Med      atorvastatin (LIPITOR) 40 mg tablet Take 1 tablet (40 mg total) by mouth daily, Starting Tue 8/17/2021, Normal      Cholecalciferol (Vitamin D3) 50 MCG (2000 UT) capsule Take 2,000 Units by mouth daily , Historical Med      !! lisinopril (ZESTRIL) 20 mg tablet Take 1 tablet (20 mg total) by mouth daily, Starting Thu 10/28/2021, Normal      !! lisinopril (ZESTRIL) 40 mg tablet Take 40 mg by mouth daily, Starting Fri 11/12/2021, Historical Med      prednisoLONE acetate (PRED FORTE) 1 % ophthalmic suspension INSTILL 1 DROP IN THE RIGHT EYE EVERY OTHER DAY, Historical Med       !! - Potential duplicate medications found  Please discuss with provider  No discharge procedures on file      PDMP Review     None          ED Provider  Electronically Signed by           Ely Gallardo MD  03/27/22 2251

## 2022-03-31 ENCOUNTER — OFFICE VISIT (OUTPATIENT)
Dept: FAMILY MEDICINE CLINIC | Facility: CLINIC | Age: 77
End: 2022-03-31
Payer: MEDICARE

## 2022-03-31 VITALS
DIASTOLIC BLOOD PRESSURE: 52 MMHG | OXYGEN SATURATION: 98 % | RESPIRATION RATE: 16 BRPM | WEIGHT: 116.9 LBS | TEMPERATURE: 97.6 F | HEIGHT: 59 IN | BODY MASS INDEX: 23.56 KG/M2 | SYSTOLIC BLOOD PRESSURE: 148 MMHG | HEART RATE: 72 BPM

## 2022-03-31 DIAGNOSIS — I10 BENIGN ESSENTIAL HYPERTENSION: Primary | ICD-10-CM

## 2022-03-31 PROCEDURE — 99214 OFFICE O/P EST MOD 30 MIN: CPT | Performed by: FAMILY MEDICINE

## 2022-03-31 PROCEDURE — 1123F ACP DISCUSS/DSCN MKR DOCD: CPT | Performed by: FAMILY MEDICINE

## 2022-03-31 NOTE — PROGRESS NOTES
Assessment/Plan:     Problem List Items Addressed This Visit        Unprioritized    Benign essential hypertension - Primary      blood pressure now back to normal, well controlled at home   Reviewed ER visit and results  Reviewed when to go to ER, when to call on call doctor   Reviewed signs/symptoms that should prompt worries     No follow-ups on file  Subjective:   Jacinda Corbett is a 67 y o  female here today for a follow-up on her current medical conditions:  Patient Active Problem List   Diagnosis    History of subdural hemorrhage    Headache    History of intracerebral hemorrhage without residual deficit    Bilateral carotid artery stenosis    Benign essential hypertension    Glaucoma    Mixed hyperlipidemia    Osteoarthritis of knee    Vitamin D deficiency    IFG (impaired fasting glucose)    Word finding difficulty    Stage 3a chronic kidney disease (HCC)        Current Medications:  Current Outpatient Medications   Medication Sig Dispense Refill    amLODIPine (NORVASC) 2 5 mg tablet Take 2 5 mg by mouth daily      atorvastatin (LIPITOR) 40 mg tablet Take 1 tablet (40 mg total) by mouth daily 90 tablet 3    Cholecalciferol (Vitamin D3) 50 MCG (2000 UT) capsule Take 2,000 Units by mouth daily       lisinopril (ZESTRIL) 40 mg tablet Take 40 mg by mouth daily      prednisoLONE acetate (PRED FORTE) 1 % ophthalmic suspension INSTILL 1 DROP IN THE RIGHT EYE EVERY OTHER DAY       No current facility-administered medications for this visit  HPI:  Chief Complaint   Patient presents with    Follow-up     3/27/22 - Hypertension    Hypertension     -- Above per clinical staff and reviewed  --    PHQ-2/9 Depression Screening              in ER /90 to 148/72 after labetolol 10 mg once   follows with cardio for BP   watch TSH/weight loss   follows with neuro Dr Heather Goldstein , neurosurg Ashutosh/Shanae , cardio Ismael Jc    March hemorrhagic stroke   suspect due to BP  started on lsinopril 10 (?co  [facility] [place], [place] contsc 10 and switched from zocor to lipitor 40  BP cuff calibrated and accurate   'to have f/u MRI end of may to f/u temporal lesion  melatonin for sleep  Today:  Normal blood pressure since she has been home   Does not know anything that would be going on   Had been to the gym on Thursday - has felt weird and left without working out   Took blood pressure next morning for that reason   Watching the salt in her diet  Feels good     The following portions of the patient's history were reviewed and updated as appropriate: allergies, current medications, past family history, past medical history, past social history, past surgical history and problem list     Objective:  Vitals:  /52   Pulse 72   Temp 97 6 °F (36 4 °C)   Resp 16   Ht 4' 11" (1 499 m)   Wt 53 kg (116 lb 14 4 oz)   SpO2 98%   BMI 23 61 kg/m²    Wt Readings from Last 3 Encounters:   03/31/22 53 kg (116 lb 14 4 oz)   03/27/22 49 9 kg (110 lb)   01/31/22 52 3 kg (115 lb 3 2 oz)      BP Readings from Last 3 Encounters:   03/31/22 148/52   03/27/22 141/63   01/31/22 148/86        Review of Systems   She has no other concerns  No unexpected weight changes  No chest pain, SOB, or palpitations  No GERD  No changes in bowels or bladder  Sleeping well  No mood changes  Physical Exam   Constitutional:  she appears well-developed and well-nourished  HENT: Head: Normocephalic  Neck: Neck supple  Cardiovascular: Normal rate, regular rhythm and normal heart sounds  Pulmonary/Chest: Effort normal and breath sounds normal  No wheezes, rales, or rhonchi  Abdominal: Soft  Bowel sounds are normal  There is no tenderness  No hepatosplenomegaly  Musculoskeletal: she exhibits no edema  Lymphadenopathy: she has no cervical adenopathy  Neurological: she is alert and oriented to person, place, and time  Skin: Skin is warm and dry  Psychiatric: she has a normal mood and affect   her behavior is normal  Thought content normal

## 2022-04-01 ENCOUNTER — TELEPHONE (OUTPATIENT)
Dept: GASTROENTEROLOGY | Facility: AMBULARY SURGERY CENTER | Age: 77
End: 2022-04-01

## 2022-04-01 ENCOUNTER — TELEPHONE (OUTPATIENT)
Dept: OTHER | Facility: OTHER | Age: 77
End: 2022-04-01

## 2022-04-01 NOTE — TELEPHONE ENCOUNTER
Pt aware of results and verbalized understanding  Advised to call office with any questions or concerns

## 2022-04-01 NOTE — TELEPHONE ENCOUNTER
----- Message from [place] MD sent at 3/31/2022  3:39 PM EDT -----  Please inform the patient that the liver enzymes are now normal   Her ceruloplasmin is borderline low however this is of no significance  Continue to avoid herbal supplements  Follow-up after colonoscopy as previously recommended       thank you

## 2022-04-01 NOTE — TELEPHONE ENCOUNTER
Reviewed lab results from (encounter 3/25) with patient   Patient verbalized understanding, No further questions

## 2022-05-02 ENCOUNTER — ANESTHESIA EVENT (OUTPATIENT)
Dept: ANESTHESIOLOGY | Facility: HOSPITAL | Age: 77
End: 2022-05-02

## 2022-05-02 ENCOUNTER — ANESTHESIA (OUTPATIENT)
Dept: ANESTHESIOLOGY | Facility: HOSPITAL | Age: 77
End: 2022-05-02

## 2022-05-16 ENCOUNTER — HOSPITAL ENCOUNTER (OUTPATIENT)
Dept: GASTROENTEROLOGY | Facility: AMBULARY SURGERY CENTER | Age: 77
Setting detail: OUTPATIENT SURGERY
Discharge: HOME | End: 2022-05-16
Attending: INTERNAL MEDICINE | Admitting: INTERNAL MEDICINE
Payer: MEDICARE

## 2022-05-16 ENCOUNTER — ANESTHESIA EVENT (OUTPATIENT)
Dept: GASTROENTEROLOGY | Facility: AMBULARY SURGERY CENTER | Age: 77
End: 2022-05-16

## 2022-05-16 ENCOUNTER — ANESTHESIA (OUTPATIENT)
Dept: GASTROENTEROLOGY | Facility: AMBULARY SURGERY CENTER | Age: 77
End: 2022-05-16

## 2022-05-16 VITALS
OXYGEN SATURATION: 97 % | TEMPERATURE: 97.1 F | SYSTOLIC BLOOD PRESSURE: 105 MMHG | BODY MASS INDEX: 22.18 KG/M2 | WEIGHT: 110 LBS | HEIGHT: 59 IN | DIASTOLIC BLOOD PRESSURE: 55 MMHG | RESPIRATION RATE: 21 BRPM | HEART RATE: 59 BPM

## 2022-05-16 DIAGNOSIS — Z12.11 COLON CANCER SCREENING: ICD-10-CM

## 2022-05-16 PROCEDURE — G0121 COLON CA SCRN NOT HI RSK IND: HCPCS | Performed by: INTERNAL MEDICINE

## 2022-05-16 RX ORDER — HYDROMORPHONE HCL 110MG/55ML
0.5 PATIENT CONTROLLED ANALGESIA SYRINGE INTRAVENOUS
Status: CANCELLED | OUTPATIENT
Start: 2022-05-16

## 2022-05-16 RX ORDER — EPHEDRINE SULFATE 50 MG/ML
INJECTION INTRAVENOUS AS NEEDED
Status: DISCONTINUED | OUTPATIENT
Start: 2022-05-16 | End: 2022-05-16

## 2022-05-16 RX ORDER — SODIUM CHLORIDE, SODIUM LACTATE, POTASSIUM CHLORIDE, CALCIUM CHLORIDE 600; 310; 30; 20 MG/100ML; MG/100ML; MG/100ML; MG/100ML
50 INJECTION, SOLUTION INTRAVENOUS CONTINUOUS
Status: CANCELLED | OUTPATIENT
Start: 2022-05-16

## 2022-05-16 RX ORDER — PROMETHAZINE HYDROCHLORIDE 25 MG/ML
12.5 INJECTION, SOLUTION INTRAMUSCULAR; INTRAVENOUS ONCE AS NEEDED
Status: CANCELLED | OUTPATIENT
Start: 2022-05-16

## 2022-05-16 RX ORDER — FENTANYL CITRATE/PF 50 MCG/ML
25 SYRINGE (ML) INJECTION
Status: CANCELLED | OUTPATIENT
Start: 2022-05-16

## 2022-05-16 RX ORDER — LIDOCAINE HYDROCHLORIDE 10 MG/ML
INJECTION, SOLUTION EPIDURAL; INFILTRATION; INTRACAUDAL; PERINEURAL AS NEEDED
Status: DISCONTINUED | OUTPATIENT
Start: 2022-05-16 | End: 2022-05-16

## 2022-05-16 RX ORDER — PROPOFOL 10 MG/ML
INJECTION, EMULSION INTRAVENOUS AS NEEDED
Status: DISCONTINUED | OUTPATIENT
Start: 2022-05-16 | End: 2022-05-16

## 2022-05-16 RX ORDER — ALBUTEROL SULFATE 2.5 MG/3ML
2.5 SOLUTION RESPIRATORY (INHALATION) ONCE AS NEEDED
Status: CANCELLED | OUTPATIENT
Start: 2022-05-16

## 2022-05-16 RX ORDER — MEPERIDINE HYDROCHLORIDE 25 MG/ML
12.5 INJECTION INTRAMUSCULAR; INTRAVENOUS; SUBCUTANEOUS
Status: CANCELLED | OUTPATIENT
Start: 2022-05-16

## 2022-05-16 RX ORDER — SODIUM CHLORIDE, SODIUM LACTATE, POTASSIUM CHLORIDE, CALCIUM CHLORIDE 600; 310; 30; 20 MG/100ML; MG/100ML; MG/100ML; MG/100ML
INJECTION, SOLUTION INTRAVENOUS CONTINUOUS PRN
Status: DISCONTINUED | OUTPATIENT
Start: 2022-05-16 | End: 2022-05-16

## 2022-05-16 RX ORDER — ONDANSETRON 2 MG/ML
4 INJECTION INTRAMUSCULAR; INTRAVENOUS ONCE AS NEEDED
Status: CANCELLED | OUTPATIENT
Start: 2022-05-16

## 2022-05-16 RX ADMIN — PROPOFOL 50 MG: 10 INJECTION, EMULSION INTRAVENOUS at 08:40

## 2022-05-16 RX ADMIN — EPHEDRINE SULFATE 10 MG: 50 INJECTION, SOLUTION INTRAVENOUS at 09:12

## 2022-05-16 RX ADMIN — PROPOFOL 50 MG: 10 INJECTION, EMULSION INTRAVENOUS at 08:42

## 2022-05-16 RX ADMIN — PROPOFOL 50 MG: 10 INJECTION, EMULSION INTRAVENOUS at 08:57

## 2022-05-16 RX ADMIN — LIDOCAINE HYDROCHLORIDE 50 MG: 10 INJECTION, SOLUTION EPIDURAL; INFILTRATION; INTRACAUDAL; PERINEURAL at 08:36

## 2022-05-16 RX ADMIN — PROPOFOL 50 MG: 10 INJECTION, EMULSION INTRAVENOUS at 08:44

## 2022-05-16 RX ADMIN — PROPOFOL 50 MG: 10 INJECTION, EMULSION INTRAVENOUS at 08:46

## 2022-05-16 RX ADMIN — PROPOFOL 50 MG: 10 INJECTION, EMULSION INTRAVENOUS at 08:54

## 2022-05-16 RX ADMIN — PROPOFOL 50 MG: 10 INJECTION, EMULSION INTRAVENOUS at 08:48

## 2022-05-16 RX ADMIN — SODIUM CHLORIDE, SODIUM LACTATE, POTASSIUM CHLORIDE, AND CALCIUM CHLORIDE: .6; .31; .03; .02 INJECTION, SOLUTION INTRAVENOUS at 08:10

## 2022-05-16 RX ADMIN — PROPOFOL 100 MG: 10 INJECTION, EMULSION INTRAVENOUS at 08:38

## 2022-05-16 RX ADMIN — PROPOFOL 50 MG: 10 INJECTION, EMULSION INTRAVENOUS at 08:51

## 2022-05-16 NOTE — H&P
History and Physical -  [facility]  Snow Ochoa 67 y o  female MRN: [id number]    HPI: Snow Ochoa is a 67y o  year old female who presents for colon cancer screening  Review of Systems    Historical Information   Past Medical History:   Diagnosis Date    Glaucoma     High blood pressure 11/30/2018    High cholesterol     Hypertension     Panic attacks 1998    Papanicolaou smear 03/17/2016    NEG    Post-menopausal     Stroke [facility])      Past Surgical History:   Procedure Laterality Date    COLONOSCOPY  2010    COLONOSCOPY      MAMMO (HISTORICAL) Bilateral 01/21/2019    No evidence of malignancy    TONSILLECTOMY      UPPER GASTROINTESTINAL ENDOSCOPY      VAGINAL DELIVERY      X4     Social History   Social History     Substance and Sexual Activity   Alcohol Use Yes    Comment: 1 per week     Social History     Substance and Sexual Activity   Drug Use No     Social History     Tobacco Use   Smoking Status Never Smoker   Smokeless Tobacco Never Used     Family History   Problem Relation Age of Onset    Heart disease Sister     Diabetes Sister     Stroke Sister     Dementia Mother     Ulcerative colitis Son     No Known Problems Daughter     Stroke Maternal Grandmother     Heart failure Maternal Grandfather     Diabetes Paternal Grandmother     No Known Problems Paternal Grandfather     No Known Problems Son     No Known Problems Daughter        Meds/Allergies     (Not in a hospital admission)      Allergies   Allergen Reactions    Codeine     Sulfa Antibiotics        Objective     BP (!) 187/79   Pulse 64   Temp 98 °F (36 7 °C) (Temporal)   Resp 18   Ht 4' 11" (1 499 m)   Wt 49 9 kg (110 lb)   SpO2 98%   BMI 22 22 kg/m²       PHYSICAL EXAM    Gen: NAD  CV: RRR  CHEST: Clear  ABD: soft, NT/ND  EXT: no edema  Neuro: AAO      ASSESSMENT/PLAN:  This is a 67y o  year old female here for colon cancer screening      Patient was explained about  the risks and benefits of the procedure  Risks including but not limited to bleeding, infection, perforation were explained in detail  Also explained about less than 100% sensitivity with the exam and other alternatives  PLAN:   Procedure:  Colonoscopy

## 2022-05-16 NOTE — ANESTHESIA PREPROCEDURE EVALUATION
Procedure:  COLONOSCOPY    Relevant Problems   CARDIO   (+) Benign essential hypertension   (+) Mixed hyperlipidemia      /RENAL   (+) Stage 3a chronic kidney disease (HCC)      MUSCULOSKELETAL   (+) Osteoarthritis of knee      NEURO/PSYCH   (+) Headache   (+) History of intracerebral hemorrhage without residual deficit   (+) History of subdural hemorrhage   (+) Word finding difficulty        Physical Exam    Airway    Mallampati score: II  TM Distance: >3 FB  Neck ROM: full     Dental       Cardiovascular  Cardiovascular exam normal    Pulmonary  Pulmonary exam normal     Other Findings        Anesthesia Plan  ASA Score- 3     Anesthesia Type- IV sedation with anesthesia with ASA Monitors  Additional Monitors:   Airway Plan:           Plan Factors-Exercise tolerance (METS): >4 METS  Chart reviewed  EKG reviewed  Imaging results reviewed  Existing labs reviewed  Patient summary reviewed  Induction- intravenous  Postoperative Plan- Plan for postoperative opioid use  Planned trial extubation    Informed Consent- Anesthetic plan and risks discussed with patient  I personally reviewed this patient with the CRNA  Discussed and agreed on the Anesthesia Plan with the CRNA  Kuldip Hernandez

## 2022-05-16 NOTE — ANESTHESIA POSTPROCEDURE EVALUATION
Post-Op Assessment Note    CV Status:  Stable  Pain Score: 0    Pain management: adequate     Mental Status:  Alert and awake   Hydration Status:  Euvolemic   PONV Controlled:  Controlled   Airway Patency:  Patent      Post Op Vitals Reviewed: Yes      Staff: CRNA         No complications documented      BP  90/52   Temp      Pulse  55   Resp 16   SpO2 95

## 2022-05-18 PROBLEM — K57.90 DIVERTICULOSIS: Status: ACTIVE | Noted: 2022-05-18

## 2022-06-14 ENCOUNTER — OFFICE VISIT (OUTPATIENT)
Dept: FAMILY MEDICINE CLINIC | Facility: CLINIC | Age: 77
End: 2022-06-14
Payer: MEDICARE

## 2022-06-14 ENCOUNTER — TELEPHONE (OUTPATIENT)
Dept: FAMILY MEDICINE CLINIC | Facility: CLINIC | Age: 77
End: 2022-06-14

## 2022-06-14 VITALS
HEART RATE: 70 BPM | HEIGHT: 59 IN | DIASTOLIC BLOOD PRESSURE: 64 MMHG | TEMPERATURE: 97.9 F | RESPIRATION RATE: 16 BRPM | BODY MASS INDEX: 22.7 KG/M2 | SYSTOLIC BLOOD PRESSURE: 120 MMHG | OXYGEN SATURATION: 96 % | WEIGHT: 112.6 LBS

## 2022-06-14 DIAGNOSIS — R73.01 IFG (IMPAIRED FASTING GLUCOSE): ICD-10-CM

## 2022-06-14 DIAGNOSIS — Z00.00 WELCOME TO MEDICARE PREVENTIVE VISIT: Primary | ICD-10-CM

## 2022-06-14 DIAGNOSIS — E78.2 MIXED HYPERLIPIDEMIA: ICD-10-CM

## 2022-06-14 DIAGNOSIS — N18.31 STAGE 3A CHRONIC KIDNEY DISEASE (HCC): ICD-10-CM

## 2022-06-14 DIAGNOSIS — E55.9 VITAMIN D DEFICIENCY: ICD-10-CM

## 2022-06-14 DIAGNOSIS — I10 BENIGN ESSENTIAL HYPERTENSION: ICD-10-CM

## 2022-06-14 PROBLEM — Z71.89 COUNSELING REGARDING ADVANCED DIRECTIVES: Status: ACTIVE | Noted: 2022-06-14

## 2022-06-14 PROCEDURE — G0439 PPPS, SUBSEQ VISIT: HCPCS | Performed by: FAMILY MEDICINE

## 2022-06-14 PROCEDURE — 99214 OFFICE O/P EST MOD 30 MIN: CPT | Performed by: FAMILY MEDICINE

## 2022-06-14 NOTE — PATIENT INSTRUCTIONS
Medicare Preventive Visit Patient Instructions  Thank you for completing your Welcome to Medicare Visit or Medicare Annual Wellness Visit today  Your next wellness visit will be due in one year (6/15/2023)  The screening/preventive services that you may require over the next 5-10 years are detailed below  Some tests may not apply to you based off risk factors and/or age  Screening tests ordered at today's visit but not completed yet may show as past due  Also, please note that scanned in results may not display below  Preventive Screenings:  Service Recommendations Previous Testing/Comments   Colorectal Cancer Screening  * Colonoscopy    * Fecal Occult Blood Test (FOBT)/Fecal Immunochemical Test (FIT)  * Fecal DNA/Cologuard Test  * Flexible Sigmoidoscopy Age: 54-65 years old   Colonoscopy: every 10 years (may be performed more frequently if at higher risk)  OR  FOBT/FIT: every 1 year  OR  Cologuard: every 3 years  OR  Sigmoidoscopy: every 5 years  Screening may be recommended earlier than age 48 if at higher risk for colorectal cancer  Also, an individualized decision between you and your healthcare provider will decide whether screening between the ages of 74-80 would be appropriate  Colonoscopy: 05/16/2022  FOBT/FIT: Not on file  Cologuard: Not on file  Sigmoidoscopy: Not on file          Breast Cancer Screening Age: 36 years old  Frequency: every 1-2 years  Not required if history of left and right mastectomy Mammogram: 07/02/2021        Cervical Cancer Screening Between the ages of 21-29, pap smear recommended once every 3 years  Between the ages of 33-67, can perform pap smear with HPV co-testing every 5 years     Recommendations may differ for women with a history of total hysterectomy, cervical cancer, or abnormal pap smears in past  Pap Smear: 04/05/2019        Hepatitis C Screening Once for adults born between 1945 and 1965  More frequently in patients at high risk for Hepatitis C Hep C Antibody: 12/07/2021        Diabetes Screening 1-2 times per year if you're at risk for diabetes or have pre-diabetes Fasting glucose: No results in last 5 years   A1C: 5 5 %        Cholesterol Screening Once every 5 years if you don't have a lipid disorder  May order more often based on risk factors  Lipid panel: 11/09/2021          Other Preventive Screenings Covered by Medicare:  1  Abdominal Aortic Aneurysm (AAA) Screening: covered once if your at risk  You're considered to be at risk if you have a family history of AAA  2  Lung Cancer Screening: covers low dose CT scan once per year if you meet all of the following conditions: (1) Age 50-69; (2) No signs or symptoms of lung cancer; (3) Current smoker or have quit smoking within the last 15 years; (4) You have a tobacco smoking history of at least 30 pack years (packs per day multiplied by number of years you smoked); (5) You get a written order from a healthcare provider  3  Glaucoma Screening: covered annually if you're considered high risk: (1) You have diabetes OR (2) Family history of glaucoma OR (3)  aged 48 and older OR (3)  American aged 72 and older  3  Osteoporosis Screening: covered every 2 years if you meet one of the following conditions: (1) You're estrogen deficient and at risk for osteoporosis based off medical history and other findings; (2) Have a vertebral abnormality; (3) On glucocorticoid therapy for more than 3 months; (4) Have primary hyperparathyroidism; (5) On osteoporosis medications and need to assess response to drug therapy  · Last bone density test (DXA Scan): Not on file  5  HIV Screening: covered annually if you're between the age of 12-76  Also covered annually if you are younger than 13 and older than 72 with risk factors for HIV infection  For pregnant patients, it is covered up to 3 times per pregnancy      Immunizations:  Immunization Recommendations   Influenza Vaccine Annual influenza vaccination during flu season is recommended for all persons aged >= 6 months who do not have contraindications   Pneumococcal Vaccine (Prevnar and Pneumovax)  * Prevnar = PCV13  * Pneumovax = PPSV23   Adults 25-60 years old: 1-3 doses may be recommended based on certain risk factors  Adults 72 years old: Prevnar (PCV13) vaccine recommended followed by Pneumovax (PPSV23) vaccine  If already received PPSV23 since turning 65, then PCV13 recommended at least one year after PPSV23 dose  Hepatitis B Vaccine 3 dose series if at intermediate or high risk (ex: diabetes, end stage renal disease, liver disease)   Tetanus (Td) Vaccine - COST NOT COVERED BY MEDICARE PART B Following completion of primary series, a booster dose should be given every 10 years to maintain immunity against tetanus  Td may also be given as tetanus wound prophylaxis  Tdap Vaccine - COST NOT COVERED BY MEDICARE PART B Recommended at least once for all adults  For pregnant patients, recommended with each pregnancy  Shingles Vaccine (Shingrix) - COST NOT COVERED BY MEDICARE PART B  2 shot series recommended in those aged 48 and above     Health Maintenance Due:      Topic Date Due    Breast Cancer Screening: Mammogram  07/02/2023    Hepatitis C Screening  Completed     Immunizations Due:      Topic Date Due    Pneumococcal Vaccine: 65+ Years (2 - PPSV23 or PCV20) 10/17/2020    COVID-19 Vaccine (4 - Booster for [employer] series) 04/18/2022     Advance Directives   What are advance directives? Advance directives are legal documents that state your wishes and plans for medical care  These plans are made ahead of time in case you lose your ability to make decisions for yourself  Advance directives can apply to any medical decision, such as the treatments you want, and if you want to donate organs  What are the types of advance directives? There are many types of advance directives, and each state has rules about how to use them   You may choose a combination of any of the following:  · Living will: This is a written record of the treatment you want  You can also choose which treatments you do not want, which to limit, and which to stop at a certain time  This includes surgery, medicine, IV fluid, and tube feedings  · Durable power of  for healthcare [place] [facility]): This is a written record that states who you want to make healthcare choices for you when you are unable to make them for yourself  This person, called a proxy, is usually a family member or a friend  You may choose more than 1 proxy  · Do not resuscitate (DNR) order:  A DNR order is used in case your heart stops beating or you stop breathing  It is a request not to have certain forms of treatment, such as CPR  A DNR order may be included in other types of advance directives  · Medical directive: This covers the care that you want if you are in a coma, near death, or unable to make decisions for yourself  You can list the treatments you want for each condition  Treatment may include pain medicine, surgery, blood transfusions, dialysis, IV or tube feedings, and a ventilator (breathing machine)  · Values history: This document has questions about your views, beliefs, and how you feel and think about life  This information can help others choose the care that you would choose  Why are advance directives important? An advance directive helps you control your care  Although spoken wishes may be used, it is better to have your wishes written down  Spoken wishes can be misunderstood, or not followed  Treatments may be given even if you do not want them  An advance directive may make it easier for your family to make difficult choices about your care  © Copyright [employer] 2018 Information is for End User's use only and may not be sold, redistributed or otherwise used for commercial purposes   All illustrations and images included in CareNotes® are the copyrighted property of A D A M , Inc  or [employer] [facility] Preventive Visit Patient Instructions  Thank you for completing your Welcome to Medicare Visit or Medicare Annual Wellness Visit today  Your next wellness visit will be due in one year (6/15/2023)  The screening/preventive services that you may require over the next 5-10 years are detailed below  Some tests may not apply to you based off risk factors and/or age  Screening tests ordered at today's visit but not completed yet may show as past due  Also, please note that scanned in results may not display below  Preventive Screenings:  Service Recommendations Previous Testing/Comments   Colorectal Cancer Screening  * Colonoscopy    * Fecal Occult Blood Test (FOBT)/Fecal Immunochemical Test (FIT)  * Fecal DNA/Cologuard Test  * Flexible Sigmoidoscopy Age: 54-65 years old   Colonoscopy: every 10 years (may be performed more frequently if at higher risk)  OR  FOBT/FIT: every 1 year  OR  Cologuard: every 3 years  OR  Sigmoidoscopy: every 5 years  Screening may be recommended earlier than age 48 if at higher risk for colorectal cancer  Also, an individualized decision between you and your healthcare provider will decide whether screening between the ages of 74-80 would be appropriate  Colonoscopy: 05/16/2022  FOBT/FIT: Not on file  Cologuard: Not on file  Sigmoidoscopy: Not on file          Breast Cancer Screening Age: 36 years old  Frequency: every 1-2 years  Not required if history of left and right mastectomy Mammogram: 07/02/2021    Screening Current   Cervical Cancer Screening Between the ages of 21-29, pap smear recommended once every 3 years  Between the ages of 33-67, can perform pap smear with HPV co-testing every 5 years     Recommendations may differ for women with a history of total hysterectomy, cervical cancer, or abnormal pap smears in past  Pap Smear: 04/05/2019    Screening Not Indicated   Hepatitis C Screening Once for adults born between 1945 and 1965  More frequently in patients at high risk for Hepatitis C Hep C Antibody: 12/07/2021    Screening Current   Diabetes Screening 1-2 times per year if you're at risk for diabetes or have pre-diabetes Fasting glucose: No results in last 5 years   A1C: 5 5 %    Screening Current   Cholesterol Screening Once every 5 years if you don't have a lipid disorder  May order more often based on risk factors  Lipid panel: 11/09/2021    Screening Not Indicated  History Lipid Disorder     Other Preventive Screenings Covered by Medicare:  6  Abdominal Aortic Aneurysm (AAA) Screening: covered once if your at risk  You're considered to be at risk if you have a family history of AAA  7  Lung Cancer Screening: covers low dose CT scan once per year if you meet all of the following conditions: (1) Age 50-69; (2) No signs or symptoms of lung cancer; (3) Current smoker or have quit smoking within the last 15 years; (4) You have a tobacco smoking history of at least 30 pack years (packs per day multiplied by number of years you smoked); (5) You get a written order from a healthcare provider  8  Glaucoma Screening: covered annually if you're considered high risk: (1) You have diabetes OR (2) Family history of glaucoma OR (3)  aged 48 and older OR (3)  American aged 72 and older  5  Osteoporosis Screening: covered every 2 years if you meet one of the following conditions: (1) You're estrogen deficient and at risk for osteoporosis based off medical history and other findings; (2) Have a vertebral abnormality; (3) On glucocorticoid therapy for more than 3 months; (4) Have primary hyperparathyroidism; (5) On osteoporosis medications and need to assess response to drug therapy  · Last bone density test (DXA Scan): Not on file  10  HIV Screening: covered annually if you're between the age of 12-76  Also covered annually if you are younger than 13 and older than 72 with risk factors for HIV infection   For pregnant patients, it is covered up to 3 times per pregnancy  Immunizations:  Immunization Recommendations   Influenza Vaccine Annual influenza vaccination during flu season is recommended for all persons aged >= 6 months who do not have contraindications   Pneumococcal Vaccine (Prevnar and Pneumovax)  * Prevnar = PCV13  * Pneumovax = PPSV23   Adults 25-60 years old: 1-3 doses may be recommended based on certain risk factors  Adults 72 years old: Prevnar (PCV13) vaccine recommended followed by Pneumovax (PPSV23) vaccine  If already received PPSV23 since turning 65, then PCV13 recommended at least one year after PPSV23 dose  Hepatitis B Vaccine 3 dose series if at intermediate or high risk (ex: diabetes, end stage renal disease, liver disease)   Tetanus (Td) Vaccine - COST NOT COVERED BY MEDICARE PART B Following completion of primary series, a booster dose should be given every 10 years to maintain immunity against tetanus  Td may also be given as tetanus wound prophylaxis  Tdap Vaccine - COST NOT COVERED BY MEDICARE PART B Recommended at least once for all adults  For pregnant patients, recommended with each pregnancy  Shingles Vaccine (Shingrix) - COST NOT COVERED BY MEDICARE PART B  2 shot series recommended in those aged 48 and above     Health Maintenance Due:      Topic Date Due    Breast Cancer Screening: Mammogram  07/02/2023    Hepatitis C Screening  Completed     Immunizations Due:      Topic Date Due    COVID-19 Vaccine (4 - Booster for [employer] series) 04/18/2022     Advance Directives   What are advance directives? Advance directives are legal documents that state your wishes and plans for medical care  These plans are made ahead of time in case you lose your ability to make decisions for yourself  Advance directives can apply to any medical decision, such as the treatments you want, and if you want to donate organs  What are the types of advance directives?   There are many types of advance directives, and each state has rules about how to use them  You may choose a combination of any of the following:  · Living will: This is a written record of the treatment you want  You can also choose which treatments you do not want, which to limit, and which to stop at a certain time  This includes surgery, medicine, IV fluid, and tube feedings  · Durable power of  for healthcare [place] [facility]): This is a written record that states who you want to make healthcare choices for you when you are unable to make them for yourself  This person, called a proxy, is usually a family member or a friend  You may choose more than 1 proxy  · Do not resuscitate (DNR) order:  A DNR order is used in case your heart stops beating or you stop breathing  It is a request not to have certain forms of treatment, such as CPR  A DNR order may be included in other types of advance directives  · Medical directive: This covers the care that you want if you are in a coma, near death, or unable to make decisions for yourself  You can list the treatments you want for each condition  Treatment may include pain medicine, surgery, blood transfusions, dialysis, IV or tube feedings, and a ventilator (breathing machine)  · Values history: This document has questions about your views, beliefs, and how you feel and think about life  This information can help others choose the care that you would choose  Why are advance directives important? An advance directive helps you control your care  Although spoken wishes may be used, it is better to have your wishes written down  Spoken wishes can be misunderstood, or not followed  Treatments may be given even if you do not want them  An advance directive may make it easier for your family to make difficult choices about your care  Urinary Incontinence   Urinary incontinence (UI)  is when you lose control of your bladder  UI develops because your bladder cannot store or empty urine properly   The 3 most common types of UI are stress incontinence, urge incontinence, or both  Medicines:   · May be given to help strengthen your bladder control  Report any side effects of medication to your healthcare provider  Do pelvic muscle exercises often:  Your pelvic muscles help you stop urinating  Squeeze these muscles tight for 5 seconds, then relax for 5 seconds  Gradually work up to squeezing for 10 seconds  Do 3 sets of 15 repetitions a day, or as directed  This will help strengthen your pelvic muscles and improve bladder control  Train your bladder:  Go to the bathroom at set times, such as every 2 hours, even if you do not feel the urge to go  You can also try to hold your urine when you feel the urge to go  For example, hold your urine for 5 minutes when you feel the urge to go  As that becomes easier, hold your urine for 10 minutes  Self-care:   · Keep a UI record  Write down how often you leak urine and how much you leak  Make a note of what you were doing when you leaked urine  · Drink liquids as directed  You may need to limit the amount of liquid you drink to help control your urine leakage  Do not drink any liquid right before you go to bed  Limit or do not have drinks that contain caffeine or alcohol  · Prevent constipation  Eat a variety of high-fiber foods  Good examples are high-fiber cereals, beans, vegetables, and whole-grain breads  Walking is the best way to trigger your intestines to have a bowel movement  · Exercise regularly and maintain a healthy weight  Weight loss and exercise will decrease pressure on your bladder and help you control your leakage  · Use a catheter as directed  to help empty your bladder  A catheter is a tiny, plastic tube that is put into your bladder to drain your urine  · Go to behavior therapy as directed  Behavior therapy may be used to help you learn to control your urge to urinate         © Copyright [employer] 2018 Information is for End User's use only and may not be sold, redistributed or otherwise used for commercial purposes   All illustrations and images included in CareNotes® are the copyrighted property of A D A M , Inc  or [facility] Martha Acuna

## 2022-06-14 NOTE — PROGRESS NOTES
Assessment/Plan:     1  Welcome to Medicare preventive visit  See other note     2  IFG (impaired fasting glucose)  Blood work in er without concerns - will update labs prior to f/u   - Hemoglobin A1C; Future    3  Mixed hyperlipidemia  Blood work in er without concerns - will update labs prior to f/u   Continue lipitor 40 mg daily  - Lipid panel; Future  - Comprehensive metabolic panel; Future    4  Vitamin D deficiency  Blood work in er without concerns - will update labs prior to f/u  Continue taking over the counter vitamin D 2000 iu daily    - Vitamin D 25 hydroxy; Future    5  Stage 3a chronic kidney disease ([facility] [address] )  Blood work in er without concerns - will update labs prior to f/u     6  Benign essential hypertension  Continue norvasc and lisinopril to help  Return in about 6 months (around 12/14/2022) for labs CC       Subjective:   Ayo Estrella is a 67 y o  female here today for a follow-up on her current medical conditions:  Patient Active Problem List   Diagnosis    History of subdural hemorrhage    Headache    History of intracerebral hemorrhage without residual deficit    Bilateral carotid artery stenosis    Benign essential hypertension    Glaucoma    Mixed hyperlipidemia    Osteoarthritis of knee    Vitamin D deficiency    IFG (impaired fasting glucose)    Word finding difficulty    Stage 3a chronic kidney disease (HCC)    Diverticulosis    Counseling regarding advanced directives        Current Medications:  Current Outpatient Medications   Medication Sig Dispense Refill    amLODIPine (NORVASC) 2 5 mg tablet Take 2 5 mg by mouth daily      atorvastatin (LIPITOR) 40 mg tablet Take 1 tablet (40 mg total) by mouth daily 90 tablet 3    Cholecalciferol (Vitamin D3) 50 MCG (2000 UT) capsule Take 2,000 Units by mouth daily       lisinopril (ZESTRIL) 40 mg tablet Take 40 mg by mouth daily      prednisoLONE acetate (PRED FORTE) 1 % ophthalmic suspension INSTILL 1 DROP IN THE RIGHT EYE EVERY OTHER DAY       No current facility-administered medications for this visit  HPI:  Chief Complaint   Patient presents with   [facility] [employer] Wellness Visit     -- Above per clinical staff and reviewed  --    PHQ-2/9 Depression Screening    Little interest or pleasure in doing things: 0 - not at all  Feeling down, depressed, or hopeless: 0 - not at all  PHQ-2 Score: 0  PHQ-2 Interpretation: Negative depression screen       ?     in ER /90 to 148/72 after labetolol 10 mg once   follows with cardio for BP   watch TSH/weight loss   follows with neuro Dr Ama Jarquin , neurosurg Biundo/Shanae , cardio Luis Felipe Finical    March hemorrhagic stroke  suspect due to BP  started on lsinopril 10 (?co  ugh, norvawell contsc 10 and switched from zocor to lipitor 40  BP cuff calibrated and accurate   'to have f u MRI end of may to f/u temporal lesion  melatonin for sleep  Today:  Checks at home around once a week 140/60s   Sometimes lower   Comes down after 5 minutes and goes down   No other cocnerns   Doing we;;         The following portions of the patient's history were reviewed and updated as appropriate: allergies, current medications, past family history, past medical history, past social history, past surgical history and problem list     Objective:  Vitals:  /64   Pulse 70   Temp 97 9 °F (36 6 °C)   Resp 16   Ht 4' 11" (1 499 m)   Wt 51 1 kg (112 lb 9 6 oz)   SpO2 96%   BMI 22 74 kg/m²    Wt Readings from Last 3 Encounters:   06/14/22 51 1 kg (112 lb 9 6 oz)   05/16/22 49 9 kg (110 lb)   03/31/22 53 kg (116 lb 14 4 oz)      BP Readings from Last 3 Encounters:   06/14/22 120/64   05/16/22 105/55   03/31/22 148/52        Review of Systems   She has no other concerns  No unexpected weight changes  No chest pain, SOB, or palpitations  No GERD  No changes in bowels or bladder  Sleeping well  No mood changes  Physical Exam   Constitutional:  she appears well-developed and well-nourished    HENT: Head: Normocephalic  Neck: Neck supple  Cardiovascular: Normal rate, regular rhythm and normal heart sounds  Pulmonary/Chest: Effort normal and breath sounds normal  No wheezes, rales, or rhonchi  Abdominal: Soft  Bowel sounds are normal  There is no tenderness  No hepatosplenomegaly  Musculoskeletal: she exhibits no edema  Lymphadenopathy: she has no cervical adenopathy  Neurological: she is alert and oriented to person, place, and time  Skin: Skin is warm and dry  Psychiatric: she has a normal mood and affect  her behavior is normal  Thought content normal        Depression Screening and Follow-up Plan: Patient was screened for depression during today's encounter  They screened negative with a PHQ-2 score of 0  Answers for HPI/ROS submitted by the patient on 6/12/2022  How would you rate your overall health?: very good  Compared to last year, how is your physical health?: slightly better  In general, how satisfied are you with your life?: very satisfied  Compared to last year, how is your eyesight?: same  Compared to last year, how is your hearing?: same  Compared to last year, how is your emotional/mental health?: much better  How often is anger a problem for you?: never, rarely  How often do you feel unusually tired/fatigued?: never, rarely  In the past 7 days, how much pain have you experienced?: some  If you answered "some" or "a lot", please rate the severity of your pain on a scale of 1 to 10 (1 being the least severe pain and 10 being the most intense pain)  : 1/10  In the past 6 months, have you lost or gained 10 pounds without trying?: No  One or more falls in the last year: No  In the past 6 months, have you accidentally leaked urine?: Yes  Additional Comments: rarely  Do you have trouble with the stairs inside or outside your home?: No  Does your home have working smoke alarms?: Yes  Does your home have a carbon monoxide monitor?: No  Which safety hazards (if any) have you experienced in your home? Please select all that apply : none  How would you describe your current diet?  Please select all that apply : Regular  In addition to prescription medications, are you taking any over-the-counter supplements?: Yes  If yes, what supplements are you taking?: vitaminD  Can you manage your medications?: Yes  Are you currently taking any opioid medications?: No  Can you walk and transfer into and out of your bed and chair?: Yes  Can you dress and groom yourself?: Yes  Can you bathe or shower yourself?: Yes  Can you feed yourself?: Yes  Can you do your laundry/ housekeeping?: Yes  Can you manage your money, pay your bills, and track your expenses?: Yes  Can you make your own meals?: Yes  Can you do your own shopping?: Yes  Please list your DME (Durable Medical Equipment) supplier, if you use one : none  Within the last 12 months, have you had any hospitalizations or Emergency Department visits?: Yes  If yes, how many times have you been hospitalized within the past year?: 1-2  Additional Comments: 1 ER visit  Do you have a living will?: Yes  Do you have a Durable POA (Power of ) for healthcare decisions?: Yes  Do you have an Advanced Directive for end of life decisions?: Yes  How often have you used an illegal drug (including marijuana) or a prescription medication for non-medical reasons in the past year?: never  What is the typical number of drinks you consume in a day?: 1  What is the typical number of drinks you consume in a week?: 1  How often did you have a drink containing alcohol in the past year?: never  How many drinks did you have on a typical day  when you were drinking in the past year?: 0  How often did you have 6 or more drinks on one occasion in the past year?: never

## 2022-06-14 NOTE — PROGRESS NOTES
Assessment and Plan:     Problem List Items Addressed This Visit        Unprioritized    Benign essential hypertension    IFG (impaired fasting glucose)    Relevant Orders    Hemoglobin A1C    Mixed hyperlipidemia    Relevant Orders    Lipid panel    Comprehensive metabolic panel    Stage 3a chronic kidney disease (HCC)    Vitamin D deficiency    Relevant Orders    Vitamin D 25 hydroxy      Other Visit Diagnoses     Welcome to Medicare preventive visit    -  Primary           Preventive health issues were discussed with patient, and age appropriate screening tests were ordered as noted in patient's After Visit Summary  Personalized health advice and appropriate referrals for health education or preventive services given if needed, as noted in patient's After Visit Summary       History of Present Illness:     Patient presents for a Medicare Wellness Visit    HPI   Patient Care Team:  Michael Ibarra DO as PCP - General (Family Medicine)  MD Shari Jones DO (Cardiology)  Santosh Townsend MD (Neurosurgery)  Lima Angel (Obstetrics and Gynecology)     Review of Systems:     Review of Systems     Problem List:     Patient Active Problem List   Diagnosis    History of subdural hemorrhage    Headache    History of intracerebral hemorrhage without residual deficit    Bilateral carotid artery stenosis    Benign essential hypertension    Glaucoma    Mixed hyperlipidemia    Osteoarthritis of knee    Vitamin D deficiency    IFG (impaired fasting glucose)    Word finding difficulty    Stage 3a chronic kidney disease ([address] )    Diverticulosis    Counseling regarding advanced directives      Past Medical and Surgical History:     Past Medical History:   Diagnosis Date    Glaucoma     High blood pressure 11/30/2018    High cholesterol     Hypertension     Panic attacks 1998    Papanicolaou smear 03/17/2016    NEG    Post-menopausal     Stroke [facility])      Past Surgical History: Procedure Laterality Date    COLONOSCOPY  2010    COLONOSCOPY      MAMMO (HISTORICAL) Bilateral 01/21/2019    No evidence of malignancy    TONSILLECTOMY      UPPER GASTROINTESTINAL ENDOSCOPY      VAGINAL DELIVERY      X4      Family History:     Family History   Problem Relation Age of Onset    Heart disease Sister     Diabetes Sister     Stroke Sister     Dementia Mother     Ulcerative colitis Son     No Known Problems Daughter     Stroke Maternal Grandmother     Heart failure Maternal Grandfather     Diabetes Paternal Grandmother     No Known Problems Paternal Grandfather     No Known Problems Son     No Known Problems Daughter       Social History:     Social History     Socioeconomic History    Marital status: /Civil Union     Spouse name: None    Number of children: 3    Years of education: None    Highest education level: None   Occupational History    Occupation: Retired   Tobacco Use    Smoking status: Never Smoker    Smokeless tobacco: Never Used   Vaping Use    Vaping Use: Never used   Substance and Sexual Activity    Alcohol use: Yes     Comment: 1 per week    Drug use: No    Sexual activity: Not Currently     Partners: Male     Birth control/protection: Post-menopausal   Other Topics Concern    None   Social History Narrative    None     Social Determinants of Health     Financial Resource Strain: Not on file   Food Insecurity: Not on file   Transportation Needs: Not on file   Physical Activity: Not on file   Stress: Not on file   Social Connections: Not on file   Intimate Partner Violence: Not on file   Housing Stability: Not on file      Medications and Allergies:     Current Outpatient Medications   Medication Sig Dispense Refill    amLODIPine (NORVASC) 2 5 mg tablet Take 2 5 mg by mouth daily      atorvastatin (LIPITOR) 40 mg tablet Take 1 tablet (40 mg total) by mouth daily 90 tablet 3    Cholecalciferol (Vitamin D3) 50 MCG (2000 UT) capsule Take 2,000 Units by mouth daily       lisinopril (ZESTRIL) 40 mg tablet Take 40 mg by mouth daily      prednisoLONE acetate (PRED FORTE) 1 % ophthalmic suspension INSTILL 1 DROP IN THE RIGHT EYE EVERY OTHER DAY       No current facility-administered medications for this visit  Allergies   Allergen Reactions    Codeine     Sulfa Antibiotics       Immunizations:     Immunization History   Administered Date(s) Administered    COVID-19 PFIZER VACCINE 0 3 ML IM 01/20/2021, 02/10/2021, 12/18/2021    INFLUENZA 10/12/2018, 10/19/2021    Influenza Quadrivalent 3 years and older 10/16/2020    Influenza, injectable, quadrivalent, preservative free 0 5 mL 10/12/2018    Pneumococcal Conjugate 13-Valent 10/17/2019    Pneumococcal Polysaccharide PPV23 01/01/2007    Tdap 01/01/2007, 07/07/2017      Health Maintenance:         Topic Date Due    Breast Cancer Screening: Mammogram  07/02/2023    Hepatitis C Screening  Completed         Topic Date Due    COVID-19 Vaccine (4 - Booster for Pfizer series) 04/18/2022      Medicare Screening Tests and Risk Assessments:     Irish Durham is here for her Subsequent Wellness visit  Health Risk Assessment:   Patient rates overall health as very good  Patient feels that their physical health rating is slightly better  Patient is very satisfied with their life  Eyesight was rated as same  Hearing was rated as same  Patient feels that their emotional and mental health rating is much better  Patients states they are never, rarely angry  Patient states they are never, rarely unusually tired/fatigued  Pain experienced in the last 7 days has been some  Patient's pain rating has been 1/10  Patient states that she has experienced no weight loss or gain in last 6 months  Depression Screening:   PHQ-2 Score: 0      Fall Risk Screening:    In the past year, patient has experienced: no history of falling in past year      Urinary Incontinence Screening:   Patient has leaked urine accidently in the last six months  rarely    Home Safety:  Patient does not have trouble with stairs inside or outside of their home  Patient has working smoke alarms and has no working carbon monoxide detector  Home safety hazards include: none  Nutrition:   Current diet is Regular  Medications:   Patient is currently taking over-the-counter supplements  OTC medications include: vitaminD  Patient is able to manage medications  Activities of Daily Living (ADLs)/Instrumental Activities of Daily Living (IADLs):   Walk and transfer into and out of bed and chair?: Yes  Dress and groom yourself?: Yes    Bathe or shower yourself?: Yes    Feed yourself? Yes  Do your laundry/housekeeping?: Yes  Manage your money, pay your bills and track your expenses?: Yes  Make your own meals?: Yes    Do your own shopping?: Yes    Durable Medical Equipment Suppliers  none    Previous Hospitalizations:   Any hospitalizations or ED visits within the last 12 months?: Yes    How many hospitalizations have you had in the last year?: 1-2    Hospitalization Comments: 1 ER visit    Advance Care Planning:   Living will: Yes    Durable POA for healthcare: Yes    Advanced directive: Yes    Advanced directive counseling given: Yes    Five wishes given: Yes      Comments: DNR   Daughter Megan Miller     Cognitive Screening:   Provider or family/friend/caregiver concerned regarding cognition?: No    PREVENTIVE SCREENINGS      Cardiovascular Screening:    General: Screening Not Indicated and History Lipid Disorder      Diabetes Screening:     General: Screening Current      Breast Cancer Screening:     General: Screening Current      Cervical Cancer Screening:    General: Screening Not Indicated      Lung Cancer Screening:     General: Screening Not Indicated      Hepatitis C Screening:    General: Screening Current    Screening, Brief Intervention, and Referral to Treatment (SBIRT)    Screening  Typical number of drinks in a day: 1  Typical number of drinks in a week: 1  Interpretation: Low risk drinking behavior      AUDIT-C Screenin) How often did you have a drink containing alcohol in the past year? never  2) How many drinks did you have on a typical day when you were drinking in the past year? 0  3) How often did you have 6 or more drinks on one occasion in the past year? never    AUDIT-C Score: 0  Interpretation: Score 0-2 (female): Negative screen for alcohol misuse    Single Item Drug Screening:  How often have you used an illegal drug (including marijuana) or a prescription medication for non-medical reasons in the past year? never    Single Item Drug Screen Score: 0  Interpretation: Negative screen for possible drug use disorder    No exam data present     Physical Exam:     /64   Pulse 70   Temp 97 9 °F (36 6 °C)   Resp 16   Ht 4' 11" (1 499 m)   Wt 51 1 kg (112 lb 9 6 oz)   SpO2 96%   BMI 22 74 kg/m²     Physical Exam     Denisjoe Saez,

## 2022-06-14 NOTE — TELEPHONE ENCOUNTER
Called pt to let her know that her papers were here in the office that she left from [address]  Will mail out to pt once address has been verified  The address in the chart does not match address on the life line screening paper work   ROSARIO SANCHEZ

## 2022-08-08 DIAGNOSIS — E78.00 PURE HYPERCHOLESTEROLEMIA: ICD-10-CM

## 2022-08-08 RX ORDER — ATORVASTATIN CALCIUM 40 MG/1
40 TABLET, FILM COATED ORAL DAILY
Qty: 90 TABLET | Refills: 3 | Status: SHIPPED | OUTPATIENT
Start: 2022-08-08

## 2022-12-07 LAB — HBA1C MFR BLD HPLC: 5.6 %

## 2022-12-16 ENCOUNTER — OFFICE VISIT (OUTPATIENT)
Dept: FAMILY MEDICINE CLINIC | Facility: CLINIC | Age: 77
End: 2022-12-16

## 2022-12-16 VITALS
HEIGHT: 59 IN | OXYGEN SATURATION: 99 % | SYSTOLIC BLOOD PRESSURE: 162 MMHG | BODY MASS INDEX: 22.74 KG/M2 | RESPIRATION RATE: 14 BRPM | DIASTOLIC BLOOD PRESSURE: 68 MMHG | TEMPERATURE: 98.7 F | WEIGHT: 112.8 LBS | HEART RATE: 65 BPM

## 2022-12-16 DIAGNOSIS — L85.3 DRY SKIN: ICD-10-CM

## 2022-12-16 DIAGNOSIS — E55.9 VITAMIN D DEFICIENCY: ICD-10-CM

## 2022-12-16 DIAGNOSIS — D22.9 CHANGING NEVUS: ICD-10-CM

## 2022-12-16 DIAGNOSIS — E78.2 MIXED HYPERLIPIDEMIA: ICD-10-CM

## 2022-12-16 DIAGNOSIS — I10 BENIGN ESSENTIAL HYPERTENSION: Primary | ICD-10-CM

## 2022-12-16 DIAGNOSIS — R73.01 IFG (IMPAIRED FASTING GLUCOSE): ICD-10-CM

## 2022-12-16 DIAGNOSIS — N18.2 CKD (CHRONIC KIDNEY DISEASE) STAGE 2, GFR 60-89 ML/MIN: ICD-10-CM

## 2022-12-16 NOTE — PATIENT INSTRUCTIONS
Eczema tips:  ·         Use moisturizing soap like dove or oil of olay  ·         Use a thick cream like Eucerin, Aquaphor, Aveeno, Ceravite or Neutrogena  Moisturize every day, 2 -3 times a day is even better  Creams with ingredients like ceramides can help repair the skin barrier  The thicker the better - thick cream and ointment forms, in a jar, are better  (the store brands will save you money) Few examples:  Cetaphil, CeraVe, Vanicream, Aquaphor, Eucerin, Aveeno  ·         Best to use fragrance free, sensitive skin products  ·         Best to use the steroid creams or moisturizers after shower or bath to trap in moisture  Apply while skin still damp  ·         A humidifier in your bedroom can help in the winter  ·         Keep baths and showers short - less than 10 minutes  ·         Use warm water, but not hot water  ·         Do not use bubble bath

## 2022-12-16 NOTE — PROGRESS NOTES
1  Benign essential hypertension  Blood pressure has been a bit labile at home  She does not wish to change blood pressure medications at this time  She will continue to watch at home  She has an appointment coming up with Dr Aman Silverman loss I will discuss with her  She will bring her blood pressure cuff and log with her   - Comprehensive metabolic panel; Future  - Lipid panel; Future  - Hemoglobin A1C; Future  - Microalbumin / creatinine urine ratio; Future  - TSH, 3rd generation with Free T4 reflex; Future    2  Changing nevus  I took a picture of the lesion behind her ear today  Will refer to dermatology  Suspect this will need biopsy  - Ambulatory referral to Dermatology; Future    3  IFG (impaired fasting glucose)  Updated blood work shows slightly elevated blood sugar has been stable  Keep up the good work with watching diet  - Comprehensive metabolic panel; Future  - Lipid panel; Future  - Hemoglobin A1C; Future  - Microalbumin / creatinine urine ratio; Future    4  Mixed hyperlipidemia  Cholesterol has also been stable  LFTs normal   Tolerating Lipitor at 40 mg well  - Comprehensive metabolic panel; Future  - Lipid panel; Future  - Hemoglobin A1C; Future  - Microalbumin / creatinine urine ratio; Future    5  CKD (chronic kidney disease) stage 2, GFR 60-89 ml/min  Kidney function stable at stage II   - Comprehensive metabolic panel; Future  - Lipid panel; Future  - Hemoglobin A1C; Future  - Microalbumin / creatinine urine ratio; Future    6  Vitamin D deficiency  Vitamin D well-controlled at 43 on 2000 IU daily  7  Dry skin  Will check thyroid level with next blood work  - TSH, 3rd generation with Free T4 reflex; Future      Return in about 6 months (around 6/16/2023)      Subjective:   Mariana Barnard is a 65 y o  female here today for a follow-up on her current medical conditions:    Blood pressure elevated today - pt suspects this is not normal (snow today)   Will check at home and see what it is at cardio in few weeks   Can adjust medicine sthere if indeed elevated   Vitamin D well-controlled  HDL cholesterol very high, may not be well protective  LDL cholesterol greater than 75  She is on Lipitor 40 mg  She is happy on this dose  She is actually going to talk to cardiology about potentially lowering this dose  We reviewed some of the risks and benefits of a statin in a patient greater than 76years old  She will talk to her cardiologist further about this medication  Skin very dry  Reviewed hydration techniques  Will check TSH with next blood work  Changing nevus behind left ear  Picture taken today  Recommend biopsy  Will refer to dermatology  Patient declined by the  COVID-vaccine  Will follow-up in 6 months for Medicare wellness visit and fasting blood work      Patient Active Problem List   Diagnosis   • History of subdural hemorrhage   • Headache   • History of intracerebral hemorrhage without residual deficit   • Bilateral carotid artery stenosis   • Benign essential hypertension   • Glaucoma   • Mixed hyperlipidemia   • Osteoarthritis of knee   • Vitamin D deficiency   • IFG (impaired fasting glucose)   • Word finding difficulty   • CKD (chronic kidney disease) stage 2, GFR 60-89 ml/min   • Diverticulosis   • Counseling regarding advanced directives       Patient Care Team:  Ede Jenkins DO as PCP - General (Family Medicine)  Leopold Aurora, MD Sandee Blalock, DO (Cardiology)  Verna Ruelas MD (Neurosurgery)  Nargis Leung (Obstetrics and Gynecology)    Current Medications:  Current Outpatient Medications   Medication Sig Dispense Refill   • amLODIPine (NORVASC) 2 5 mg tablet Take 2 5 mg by mouth daily     • atorvastatin (LIPITOR) 40 mg tablet Take 1 tablet (40 mg total) by mouth daily 90 tablet 3   • Cholecalciferol (Vitamin D3) 50 MCG (2000 UT) capsule Take 2,000 Units by mouth daily      • lisinopril (ZESTRIL) 40 mg tablet Take 40 mg by mouth daily     • prednisoLONE acetate (PRED FORTE) 1 % ophthalmic suspension INSTILL 1 DROP IN THE RIGHT EYE EVERY OTHER DAY       No current facility-administered medications for this visit  HPI:  Chief Complaint   Patient presents with   • Follow-up     6 mo f/u  No new problems or concerns  -- Above per clinical staff and reviewed  --    PHQ-2/9 Depression Screening    Little interest or pleasure in doing things: 0 - not at all  Feeling down, depressed, or hopeless: 1 - several days  PHQ-2 Score: 1  PHQ-2 Interpretation: Negative depression screen       ? Dec 2022 labs in chart ([facility])   follows with cardio for BP   watch TSH/weight loss   follows with neuro Dr Madison Valencia , neurosurg Bipraveeno/Shanae , cardio Arik Patel    March hemorrhagic stroke  suspect due to BP  started on lsinopril 10 (?cough, norvawell contsc 1  0 and switched from zocor to lipitor 40  BP cuff calibrated and accurate   Today:  Left ear lesion   Itchy few months   Blood pressure  can go up and down   To see Dr Navid Valdez after year   Does not feel when when this happens - no chest pain, diaphoresis, shortness of breath or palpitations  At home 149/66 - up and down   No bivalent COVID  - does not want one     Had flu shot  Wears mask   The following portions of the patient's history were reviewed and updated as appropriate: allergies, current medications, past family history, past medical history, past social history, past surgical history and problem list     Objective:  Vitals:  /68   Pulse 65   Temp 98 7 °F (37 1 °C)   Resp 14   Ht 4' 11" (1 499 m)   Wt 51 2 kg (112 lb 12 8 oz)   SpO2 99%   BMI 22 78 kg/m²    Wt Readings from Last 3 Encounters:   12/16/22 51 2 kg (112 lb 12 8 oz)   06/14/22 51 1 kg (112 lb 9 6 oz)   05/16/22 49 9 kg (110 lb)      BP Readings from Last 3 Encounters:   12/16/22 162/68   06/14/22 120/64   05/16/22 105/55        Review of Systems   She has no other concerns  No unexpected weight changes   No chest pain, SOB, or palpitations  No GERD  No changes in bowels or bladder  Sleeping well  No mood changes  + skin changes       Physical Exam   Constitutional:  she appears well-developed and well-nourished  HENT: Head: Normocephalic  Neck: Neck supple  Cardiovascular: Normal rate, regular rhythm and normal heart sounds  Pulmonary/Chest: Effort normal and breath sounds normal  No wheezes, rales, or rhonchi  Abdominal: Soft  Bowel sounds are normal  There is no tenderness  No hepatosplenomegaly  Musculoskeletal: she exhibits no edema  Lymphadenopathy: she has no cervical a   Neurological: she is alert and oriented to person, place, and time  Skin: Skin is warm and dry  Posterior to left ear:    Psychiatric: she has a normal mood and affect  her behavior is normal  Thought content normal        Depression Screening and Follow-up Plan: Patient was screened for depression during today's encounter  They screened negative with a PHQ-2 score of 1

## 2023-01-12 ENCOUNTER — TELEPHONE (OUTPATIENT)
Dept: OTHER | Facility: OTHER | Age: 78
End: 2023-01-12

## 2023-04-24 ENCOUNTER — OFFICE VISIT (OUTPATIENT)
Dept: FAMILY MEDICINE CLINIC | Facility: CLINIC | Age: 78
End: 2023-04-24

## 2023-04-24 VITALS
DIASTOLIC BLOOD PRESSURE: 70 MMHG | TEMPERATURE: 100 F | HEIGHT: 59 IN | WEIGHT: 114.6 LBS | RESPIRATION RATE: 16 BRPM | OXYGEN SATURATION: 96 % | SYSTOLIC BLOOD PRESSURE: 172 MMHG | HEART RATE: 86 BPM | BODY MASS INDEX: 23.1 KG/M2

## 2023-04-24 DIAGNOSIS — R35.0 URINE FREQUENCY: ICD-10-CM

## 2023-04-24 DIAGNOSIS — R10.31 BILATERAL GROIN PAIN: Primary | ICD-10-CM

## 2023-04-24 DIAGNOSIS — S76.219A GROIN STRAIN, UNSPECIFIED LATERALITY, INITIAL ENCOUNTER: ICD-10-CM

## 2023-04-24 DIAGNOSIS — N18.2 CKD (CHRONIC KIDNEY DISEASE) STAGE 2, GFR 60-89 ML/MIN: ICD-10-CM

## 2023-04-24 DIAGNOSIS — I10 BENIGN ESSENTIAL HYPERTENSION: ICD-10-CM

## 2023-04-24 DIAGNOSIS — R10.32 BILATERAL GROIN PAIN: Primary | ICD-10-CM

## 2023-04-24 LAB
BACTERIA UR QL AUTO: NORMAL /HPF
BILIRUB UR QL STRIP: NEGATIVE
CLARITY UR: CLEAR
COLOR UR: COLORLESS
GLUCOSE UR STRIP-MCNC: NEGATIVE MG/DL
HGB UR QL STRIP.AUTO: NEGATIVE
KETONES UR STRIP-MCNC: NEGATIVE MG/DL
LEUKOCYTE ESTERASE UR QL STRIP: NEGATIVE
NITRITE UR QL STRIP: NEGATIVE
NON-SQ EPI CELLS URNS QL MICRO: NORMAL /HPF
PH UR STRIP.AUTO: 6 [PH]
PROT UR STRIP-MCNC: NEGATIVE MG/DL
RBC #/AREA URNS AUTO: NORMAL /HPF
SL AMB  POCT GLUCOSE, UA: ABNORMAL
SL AMB LEUKOCYTE ESTERASE,UA: ABNORMAL
SL AMB POCT BILIRUBIN,UA: ABNORMAL
SL AMB POCT BLOOD,UA: ABNORMAL
SL AMB POCT CLARITY,UA: CLEAR
SL AMB POCT COLOR,UA: YELLOW
SL AMB POCT KETONES,UA: ABNORMAL
SL AMB POCT NITRITE,UA: ABNORMAL
SL AMB POCT PH,UA: 6
SL AMB POCT SPECIFIC GRAVITY,UA: 1.01
SL AMB POCT URINE PROTEIN: ABNORMAL
SL AMB POCT UROBILINOGEN: ABNORMAL
SP GR UR STRIP.AUTO: 1.01 (ref 1–1.03)
UROBILINOGEN UR STRIP-ACNC: <2 MG/DL
WBC #/AREA URNS AUTO: NORMAL /HPF

## 2023-04-24 NOTE — PROGRESS NOTES
Assessment/Plan:  Problem List Items Addressed This Visit        Cardiovascular and Mediastinum    Benign essential hypertension     Uncontrolled  Management per Cardio - patient advised to F/U c Cardio  Check blood pressure outside of office  Recommend lifestyle modifications  Genitourinary    CKD (chronic kidney disease) stage 2, GFR 60-89 ml/min     Lab Results   Component Value Date    EGFR 58 03/27/2022    EGFR 58 03/15/2021    EGFR 58 02/23/2021    CREATININE 0 94 03/27/2022    CREATININE 0 96 03/15/2021    CREATININE 0 95 02/23/2021     You may use Tylenol (Acetaminophen) up to 3,000mg daily (in 24 hours) as needed for pain or fever  Other Visit Diagnoses     Bilateral groin pain    -  Primary    Relevant Orders    Ambulatory Referral to Physical Therapy    Suspect due to groin strain  Home exercise program given  You may use Tylenol (Acetaminophen) up to 3,000mg daily (in 24 hours) as needed for pain or fever  Groin strain, unspecified laterality, initial encounter        Relevant Orders    Ambulatory Referral to Physical Therapy    See above  Urine frequency        Relevant Orders    POCT urine dip (Completed)    Urinalysis with microscopic    Urine culture    Urine dip shows blood  Urine studies pending  Return if symptoms worsen or fail to improve  Future Appointments   Date Time Provider Luz Escobedo   6/20/2023  8:00 AM Mark Palencia DO [facility]        Subjective:     Gigi Rubio is a 65 y o  female who presents today for a follow-up on her acute medical conditions  HPI:  Chief Complaint   Patient presents with   • Possible UTI     Started a few weeks ago, urinating frequently which is more than normal, no painful urination, no burning      -- Above per clinical staff and reviewed  --    HPI      Today:      B/L Groin Pain - Symptoms x couple weeks  Achy  Currently 0/10, Max 4-5/10  Relieved c changing positions  "Occurs at night when she rolls around in bed  Asymptomatic during the day  She denies trauma  She is exercising 3 times per week  Not using OTC meds  Denies H/O kidney stones  No Uro previously  Last saw Alen Floyd PA-C at [address] 4/5/19 for routine gyn visit  H/O Lichen - was using Elocon      BP check outside of office today 156/70, 170/70  The following portions of the patient's history were reviewed and updated as appropriate: allergies, current medications, past family history, past medical history, past social history, past surgical history and problem list       Review of Systems   Constitutional: Positive for chills  Negative for appetite change, diaphoresis, fatigue and fever  Respiratory: Negative for chest tightness and shortness of breath  Cardiovascular: Negative for chest pain  Gastrointestinal: Negative for abdominal pain, blood in stool, diarrhea, nausea and vomiting  Genitourinary: Positive for frequency  Negative for dysuria, flank pain, hematuria, urgency and vaginal discharge  Musculoskeletal: Positive for myalgias  Current Outpatient Medications   Medication Sig Dispense Refill   • amLODIPine (NORVASC) 2 5 mg tablet Take 2 5 mg by mouth daily     • atorvastatin (LIPITOR) 40 mg tablet Take 1 tablet (40 mg total) by mouth daily 90 tablet 3   • Cholecalciferol (Vitamin D3) 50 MCG (2000 UT) capsule Take 2,000 Units by mouth daily      • lisinopril (ZESTRIL) 40 mg tablet Take 40 mg by mouth daily     • prednisoLONE acetate (PRED FORTE) 1 % ophthalmic suspension INSTILL 1 DROP IN THE RIGHT EYE EVERY OTHER DAY       No current facility-administered medications for this visit         Objective:  BP (!) 172/70   Pulse 86   Temp 100 °F (37 8 °C) (Temporal)   Resp 16   Ht 4' 11\" (1 499 m)   Wt 52 kg (114 lb 9 6 oz)   SpO2 96%   BMI 23 15 kg/m²    Wt Readings from Last 3 Encounters:   04/24/23 52 kg (114 lb 9 6 oz)   12/16/22 51 2 kg (112 lb " 12 8 oz)   06/14/22 51 1 kg (112 lb 9 6 oz)      BP Readings from Last 3 Encounters:   04/24/23 (!) 172/70   12/16/22 162/68   06/14/22 120/64          Physical Exam  Vitals and nursing note reviewed  Constitutional:       Appearance: Normal appearance  She is well-developed and normal weight  HENT:      Head: Normocephalic and atraumatic  Mouth/Throat:      Mouth: Mucous membranes are dry  Eyes:      Conjunctiva/sclera: Conjunctivae normal    Neck:      Thyroid: No thyromegaly  Cardiovascular:      Rate and Rhythm: Normal rate and regular rhythm  Pulses: Normal pulses  Heart sounds: Murmur heard  Systolic murmur is present with a grade of 3/6  Pulmonary:      Effort: Pulmonary effort is normal       Breath sounds: Normal breath sounds  Abdominal:      General: Bowel sounds are normal  There is no distension  Palpations: Abdomen is soft  There is no mass  Tenderness: There is no abdominal tenderness  There is no right CVA tenderness, left CVA tenderness, guarding or rebound  Musculoskeletal:         General: No swelling or tenderness (B/L groin)  Cervical back: Neck supple  Right lower leg: No edema  Left lower leg: No edema  Comments: B/L Hips stable c full AROM  Lymphadenopathy:      Cervical: No cervical adenopathy  Neurological:      General: No focal deficit present  Mental Status: She is alert and oriented to person, place, and time     Psychiatric:         Mood and Affect: Mood normal          Lab Results:      Lab Results   Component Value Date    WBC 6 59 03/27/2022    HGB 14 2 03/27/2022    HCT 44 5 03/27/2022     03/27/2022    TRIG 84 04/02/2021    HDL 83 04/02/2021    ALT 48 03/27/2022    AST 37 03/27/2022    K 4 1 03/27/2022     03/27/2022    CREATININE 0 94 03/27/2022    BUN 21 03/27/2022    CO2 28 03/27/2022    INR 1 03 02/22/2021    HGBA1C 5 6 12/07/2022     No results found for: Abdi Hurtado  Invalid input(s): Honey Machuca Vitamin D    No results found  POCT Labs        Depression Screening and Follow-up Plan: Patient was screened for depression during today's encounter  They screened negative with a PHQ-2 score of 0

## 2023-04-24 NOTE — ASSESSMENT & PLAN NOTE
Uncontrolled  Management per Cardio - patient advised to F/U c Cardio  Check blood pressure outside of office  Recommend lifestyle modifications

## 2023-04-24 NOTE — ASSESSMENT & PLAN NOTE
Lab Results   Component Value Date    EGFR 58 03/27/2022    EGFR 58 03/15/2021    EGFR 58 02/23/2021    CREATININE 0 94 03/27/2022    CREATININE 0 96 03/15/2021    CREATININE 0 95 02/23/2021     You may use Tylenol (Acetaminophen) up to 3,000mg daily (in 24 hours) as needed for pain or fever

## 2023-04-24 NOTE — PATIENT INSTRUCTIONS
You may use Tylenol (Acetaminophen) up to 3,000mg daily (in 24 hours) as needed for pain or fever  Trial of Carolee Hot

## 2023-04-25 NOTE — RESULT ENCOUNTER NOTE
Stable urinalysis  Urine culture is still pending  Continue plan of care      Message sent to patient via [employer] patient portal

## 2023-04-26 LAB — BACTERIA UR CULT: NORMAL

## 2023-04-26 NOTE — RESULT ENCOUNTER NOTE
Urine culture is negative for urinary tract infection  Normal bacteria from the urinary, genital, and rectal tracts seen  Continue plan of care        Message sent to patient via [employer] patient portal

## 2023-06-14 ENCOUNTER — RA CDI HCC (OUTPATIENT)
Dept: OTHER | Facility: HOSPITAL | Age: 78
End: 2023-06-14

## 2023-06-14 NOTE — PROGRESS NOTES
[facility] [address]  coding opportunities       Chart reviewed, no opportunity found: CHART REVIEWED, NO OPPORTUNITY FOUND        Patients Insurance     Medicare Insurance: Medicare

## 2023-06-19 NOTE — PROGRESS NOTES
--------------REQUESTING ADDITIONAL DAY 10/31    CONTINUED STAY REVIEW    Payor: [address] [place] [address]  Subscriber #:  [id number]  Authorization Number: [id number]    Admit date: 10/25/18  Admit time: [address] [facility]    Admitting Physician: Julia Alberts MD  Att 1  Encounter for Medicare annual wellness exam  See other note  Confirmed DNR status, daughter POA - bring in advanced directive  2  Benign essential hypertension  Not at goal today  Reviewed goal blood pressure - sBP elevated  She will check at home  Her cuff is accurate  If running > 140/90 she will call and we will add on hydrochlorothiazide (should really be closer to 130/80)     3  Screening mammogram, encounter for  -     Mammo screening bilateral w cad; Future; Expected date: 07/07/2023    4  Female stress incontinence  Info given     5  Stage 3a chronic kidney disease ([facility] [address] )  Update labs      declines dexa scans     Return in 1 year (on 6/20/2024) for Medicare Wellness Visit  in one year  Alternate appts with cardiology       Subjective:   Michoacano Gutierrez is a 65 y o  female here today for a follow-up on her current medical conditions:    Patient Active Problem List   Diagnosis   • History of subdural hemorrhage   • Headache   • History of intracerebral hemorrhage without residual deficit   • Bilateral carotid artery stenosis   • Benign essential hypertension   • Glaucoma   • Mixed hyperlipidemia   • Osteoarthritis of knee   • Vitamin D deficiency   • IFG (impaired fasting glucose)   • Word finding difficulty   • CKD (chronic kidney disease) stage 2, GFR 60-89 ml/min   • Diverticulosis   • Counseling regarding advanced directives   • Stage 3a chronic kidney disease [facility])       Patient Care Team:  Kassi Valentin DO as PCP - General (Family Medicine)  MD Cristina Livingston DO (Cardiology)  Sin Samson MD (Neurosurgery)  Abel Kaba (Obstetrics and Gynecology)    Current Medications:  Current Outpatient Medications   Medication Sig Dispense Refill   • amLODIPine (NORVASC) 2 5 mg tablet Take 2 5 mg by mouth daily     • atorvastatin (LIPITOR) 40 mg tablet Take 1 tablet (40 mg total) by mouth daily 90 tablet 3   • Cholecalciferol (Vitamin D3) 50 MCG (2000 UT) capsule Take 2,000 Units by "mouth daily      • lisinopril (ZESTRIL) 40 mg tablet Take 40 mg by mouth daily     • prednisoLONE acetate (PRED FORTE) 1 % ophthalmic suspension INSTILL 1 DROP IN THE RIGHT EYE EVERY OTHER DAY       No current facility-administered medications for this visit  HPI:  Chief Complaint   Patient presents with   • Medicare Wellness Visit     -- Above per clinical staff and reviewed  --    PHQ-2/9 Depression Screening         ? 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 stroke  suspect due to BP  started on lsinopril 10 (?cough, norvawell contsc 10 and switched from zocor to lipitor 40  BP cuff calibrated and accurate   Today:  bp was high at the last visit   140s at home   Cardio lowered norvasc and increased lisinopril - she thinks this is probably fine   Did not have labs done but will   Declines dexa scan   Thinks she is seeing cardio once a year     The following portions of the patient's history were reviewed and updated as appropriate: allergies, current medications, past family history, past medical history, past social history, past surgical history and problem list     Objective:  Vitals:  /62   Pulse 66   Temp 97 9 °F (36 6 °C)   Resp 14   Ht 4' 11\" (1 499 m)   Wt 51 3 kg (113 lb)   SpO2 98%   BMI 22 82 kg/m²    Wt Readings from Last 3 Encounters:   06/20/23 51 3 kg (113 lb)   04/24/23 52 kg (114 lb 9 6 oz)   12/16/22 51 2 kg (112 lb 12 8 oz)      BP Readings from Last 3 Encounters:   06/20/23 142/62   04/24/23 (!) 172/70   12/16/22 162/68        Review of Systems   She has no other concerns  No unexpected weight changes  No chest pain, SOB, or palpitations  No GERD  No changes in bowels or bladder  Sleeping well   No mood " Date Action Dose Route User    10/31/2018 1145 Given 1600 mg Oral Aurora Barbosa RN    10/31/2018 4368 Given 1600 mg Oral Aurora Barbosa RN    10/30/2018 1718 Given 1600 mg Oral REGGIE Do MD   Physician   N Lymphatic: no abnormal cervical, supraclavicular or axillary adenopathy is noted  Respiratory: normal to inspection lungs are clear to auscultation bilaterally normal respiratory effort  Cardiovascular: regular rate and rhythm no murmurs, gallups, or rubs changes  Physical Exam   Constitutional:  she appears well-developed and well-nourished  HENT: Head: Normocephalic  Neck: Neck supple  Cardiovascular: Normal rate, regular rhythm and normal heart sounds  Pulmonary/Chest: Effort normal and breath sounds normal  No wheezes, rales, or rhonchi  Abdominal: Soft  Bowel sounds are normal  There is no tenderness  No hepatosplenomegaly  Musculoskeletal: she exhibits no edema  Lymphadenopathy: she has no cervical adenopathy  Neurological: she is alert and oriented to person, place, and time  Skin: Skin is warm and dry  Psychiatric: she has a normal mood and affect  her behavior is normal  Thought content normal         Falls Plan of Care: Balance, strength, and gait training instructions were provided  Orders Placed This Encounter      CBC With Differential With Platelet      Basic Metabolic Panel (8)      Urinalysis with Culture Reflex Once      Magnesium      Phosphorus      Basic Metabolic Panel (8)      CBC With Differential With Platelet      Potass

## 2023-06-19 NOTE — PATIENT INSTRUCTIONS
Please bring in advanced directive to us  Check your Blood pressure at home  If running >140/90  call us and we will start hydrochlorothiazide  Vaccines you are due for: shingrix     As of January 2023 most vaccines are being covered by Medicare Part D * This means you may now be able to get Shingrix or Tdap at no charge to you  Please ask at the pharmacy to see if this is covered  If you are the pharmacist can administer these vaccines for you  Tdap is due every 10 years as a preventive vaccine  Shingrix is given as two parts  The second dose is given 2 - 6 months after the first dose  *The only exceptions are flu, pneumonia, hepatitis B, and COVID-19 vaccinations, which are covered by Part B - this means they can be given at the pharmacy or the doctor's office  Immunization History   Administered Date(s) Administered    COVID-19 PFIZER VACCINE 0 3 ML IM 01/20/2021, 02/10/2021, 12/18/2021    INFLUENZA 10/12/2018, 10/19/2021, 10/26/2022    Influenza Quadrivalent 3 years and older 10/16/2020    Influenza, injectable, quadrivalent, preservative free 0 5 mL 10/12/2018    Pneumococcal Conjugate 13-Valent 10/17/2019    Pneumococcal Polysaccharide PPV23 01/01/2007    Tdap 01/01/2007, 07/07/2017       Urinary Incontinence   AMBULATORY CARE:   Urinary incontinence (UI)  is when you lose control of your bladder  UI develops because your bladder cannot store or empty urine properly  The 3 most common types of UI are stress incontinence, urge incontinence, or both  Common symptoms include the following: You feel like your bladder does not empty completely when you urinate  You urinate often and need to urinate immediately  You leak urine when you sleep, or you wake up with the urge to urinate  You leak urine when you cough, sneeze, exercise, or laugh  Call your doctor if:   You have severe pain  You are confused or cannot think clearly  You have a fever  You see blood in your urine      You have pain when you urinate  You have new or worse pain, even after treatment  Your mouth feels dry or you have vision changes  Your urine is cloudy or smells bad  You have questions or concerns about your condition or care  Medicines:   Medicines  may be given to help strengthen your bladder control  Take your medicine as directed  Contact your healthcare provider if you think your medicine is not helping or if you have side effects  Tell your provider if you are allergic to any medicine  Keep a list of the medicines, vitamins, and herbs you take  Include the amounts, and when and why you take them  Bring the list or the pill bottles to follow-up visits  Carry your medicine list with you in case of an emergency  Do pelvic muscle exercises often:  Your pelvic muscles help you stop urinating  Squeeze these muscles tight for 5 seconds, then relax for 5 seconds  Gradually work up to squeezing for 10 seconds  Do 3 sets of 15 repetitions a day, or as directed  This will help strengthen your pelvic muscles and improve bladder control  Train your bladder:  Go to the bathroom at set times, such as every 2 hours, even if you do not feel the urge to go  You can also try to hold your urine when you feel the urge to go  For example, hold your urine for 5 minutes when you feel the urge to go  As that becomes easier, hold your urine for 10 minutes  Self-care:   Keep a UI record  Write down how often you leak urine and how much you leak  Make a note of what you were doing when you leaked urine  Drink liquids as directed  Ask your healthcare provider how much liquid to drink each day and which liquids are best for you  You may need to limit the amount of liquid you drink to help control your urine leakage  Do not drink any liquid right before you go to bed  Limit or do not have drinks that contain caffeine or alcohol  Prevent constipation  Eat a variety of high-fiber foods   Good examples are high-fiber cereals, beans, vegetables, and whole-grain breads  Prune juice may help make your bowel movement softer  Walking is the best way to trigger your intestines to have a bowel movement  Exercise regularly and maintain a healthy weight  Ask your healthcare provider how much you should weigh and about the best exercise plan for you  Weight loss and exercise will decrease pressure on your bladder and help you control your leakage  Ask him or her to help you create a weight loss plan if you are overweight  Use a catheter as directed  to help empty your bladder  A catheter is a tiny, plastic tube that is put into your bladder to drain your urine  Your healthcare provider may tell you to use a catheter to prevent your bladder from getting too full and leaking urine  Go to behavior therapy as directed  Behavior therapy may be used to help you learn to control your urge to urinate  Follow up with your doctor as directed:  Write down your questions so you remember to ask them during your visits  © Copyright [facility] 2022 Information is for End User's use only and may not be sold, redistributed or otherwise used for commercial purposes  The above information is an  only  It is not intended as medical advice for individual conditions or treatments  Talk to your doctor, nurse or pharmacist before following any medical regimen to see if it is safe and effective for you  Kegel Exercises for Women   AMBULATORY CARE:   Kegel exercises  help strengthen your pelvic muscles  Pelvic muscles hold your pelvic organs, such as your bladder and uterus, in place  Kegel exercises help prevent or control certain conditions, such as urine incontinence (leakage) or uterine prolapse  Call your doctor or physical therapist if:   You cannot feel your muscles tighten or relax  You continue to leak urine  You have questions or concerns about your condition or care      Use the correct muscles: Pelvic muscles are the muscles you use to control urine flow  To target these muscles, stop and start the flow of urine several times  This will help you become familiar with how it feels to tighten and relax these muscles  How to do Kegel exercises:   Get into a comfortable position  You may lie down, stand up, or sit down to do these exercises  When you first try to do these exercises, it may be easier if you lie down  Tighten or squeeze your pelvic muscles slowly  It may feel like you are trying to hold back urine or gas  Hold this position for 3 seconds  Relax for 3 seconds  Repeat this cycle 10 times  Do not hold your breath when you do Kegel exercises  Keep your stomach, back, and leg muscles relaxed  Do 10 sets of Kegel exercises, at least 3 times a day  When you know how to do Kegel exercises, use different positions  This will help to strengthen your pelvic muscles as much as possible  You can do these exercises while you lie on the floor, watch TV, or while you stand  Tighten your pelvic muscles before you sneeze, cough, or lift to prevent urine leakage  You may notice improved bladder control within about 6 weeks  Follow up with your doctor or physical therapist as directed:  Write down your questions so you remember to ask them during your visits  © Copyright [employer] 2022 Information is for End User's use only and may not be sold, redistributed or otherwise used for commercial purposes  The above information is an  only  It is not intended as medical advice for individual conditions or treatments  Talk to your doctor, nurse or pharmacist before following any medical regimen to see if it is safe and effective for you  Fall Prevention   AMBULATORY CARE:   Fall prevention  includes ways to make your home and other areas safer  It also includes ways you can move more carefully to prevent a fall   Health conditions that cause changes in your blood pressure, vision, or muscle strength and coordination may increase your risk for falls  Medicines may also increase your risk for falls if they make you dizzy, weak, or sleepy  Call your local emergency number [phone] in the [address]) or have someone call if:   You have fallen and are unconscious  You have fallen and cannot move part of your body  Call your doctor if:   You have fallen and have pain or a headache  You have questions or concerns about your condition or care  Fall prevention tips:   Stand or sit up slowly  This may help you keep your balance and prevent falls  Use assistive devices as directed  Your healthcare provider may suggest that you use a cane or walker to help you keep your balance  You may need to have grab bars put in your bathroom near the toilet or in the shower  Wear shoes that fit well and have soles that   Wear shoes both inside and outside  Use slippers with good   Do not wear shoes with high heels  Wear a personal alarm  This is a device that allows you to call 911 if you fall and need help  Ask your healthcare provider for more information  Stay active  Exercise can help strengthen your muscles and improve your balance  Your healthcare provider may recommend water aerobics or walking  He or she may also recommend physical therapy to improve your coordination  Never start an exercise program without talking to your healthcare provider first          Manage your medical conditions  Keep all appointments with your healthcare providers  Visit your eye doctor as directed  Home safety tips: Add items to prevent falls in the bathroom  Put nonslip strips on your bath or shower floor to prevent you from slipping  Use a bath mat if you do not have carpet in the bathroom  This will prevent you from falling when you step out of the bath or shower  Use a shower seat so you do not need to stand while you shower   Sit on the toilet or a chair in your bathroom to dry yourself and put on clothing  This will prevent you from losing your balance from drying or dressing yourself while you are standing  Keep paths clear  Remove books, shoes, and other objects from walkways and stairs  Place cords for telephones and lamps out of the way so that you do not need to walk over them  Tape them down if you cannot move them  Remove small rugs  If you cannot remove a rug, secure it with double-sided tape  This will prevent you from tripping  Install bright lights in your home  Use night lights to help light paths to the bathroom or kitchen  Always turn on the light before you start walking  Keep items you use often on shelves within reach  Do not use a step stool to help you reach an item  Paint or place reflective tape on the edges of your stairs  This will help you see the stairs better  Follow up with your doctor as directed:  Write down your questions so you remember to ask them during your visits  © Copyright Jose Roberto Dietz 2022 Information is for End User's use only and may not be sold, redistributed or otherwise used for commercial purposes  The above information is an  only  It is not intended as medical advice for individual conditions or treatments  Talk to your doctor, nurse or pharmacist before following any medical regimen to see if it is safe and effective for you  Medicare Preventive Visit Patient Instructions  Thank you for completing your Welcome to Medicare Visit or Medicare Annual Wellness Visit today  Your next wellness visit will be due in one year (6/20/2024)  The screening/preventive services that you may require over the next 5-10 years are detailed below  Some tests may not apply to you based off risk factors and/or age  Screening tests ordered at today's visit but not completed yet may show as past due  Also, please note that scanned in results may not display below    Preventive Screenings:  Service Recommendations Previous Testing/Comments   Colorectal Cancer Screening  * Colonoscopy    * Fecal Occult Blood Test (FOBT)/Fecal Immunochemical Test (FIT)  * Fecal DNA/Cologuard Test  * Flexible Sigmoidoscopy Age: 39-70 years old   Colonoscopy: every 10 years (may be performed more frequently if at higher risk)  OR  FOBT/FIT: every 1 year  OR  Cologuard: every 3 years  OR  Sigmoidoscopy: every 5 years  Screening may be recommended earlier than age 39 if at higher risk for colorectal cancer  Also, an individualized decision between you and your healthcare provider will decide whether screening between the ages of 74-80 would be appropriate  Colonoscopy: 05/16/2022  FOBT/FIT: Not on file  Cologuard: Not on file  Sigmoidoscopy: Not on file          Breast Cancer Screening Age: 36 years old  Frequency: every 1-2 years  Not required if history of left and right mastectomy Mammogram: 07/07/2022    Screening Current   Cervical Cancer Screening Between the ages of 21-29, pap smear recommended once every 3 years  Between the ages of 33-67, can perform pap smear with HPV co-testing every 5 years  Recommendations may differ for women with a history of total hysterectomy, cervical cancer, or abnormal pap smears in past  Pap Smear: 04/05/2019    Screening Not Indicated   Hepatitis C Screening Once for adults born between 1945 and 1965  More frequently in patients at high risk for Hepatitis C Hep C Antibody: 12/07/2021    Screening Current   Diabetes Screening 1-2 times per year if you're at risk for diabetes or have pre-diabetes Fasting glucose: No results in last 5 years (No results in last 5 years)  A1C: 5 6 % (12/7/2022)  Screening Current   Cholesterol Screening Once every 5 years if you don't have a lipid disorder  May order more often based on risk factors  Lipid panel: 12/07/2022    Screening Not Indicated  History Lipid Disorder     Other Preventive Screenings Covered by Medicare:  Abdominal Aortic Aneurysm (AAA) Screening: covered once if your at risk   Jeremi Medina considered to be at risk if you have a family history of AAA  Lung Cancer Screening: covers low dose CT scan once per year if you meet all of the following conditions: (1) Age 50-69; (2) No signs or symptoms of lung cancer; (3) Current smoker or have quit smoking within the last 15 years; (4) You have a tobacco smoking history of at least 20 pack years (packs per day multiplied by number of years you smoked); (5) You get a written order from a healthcare provider  Glaucoma Screening: covered annually if you're considered high risk: (1) You have diabetes OR (2) Family history of glaucoma OR (3)  aged 48 and older OR (3)  American aged 72 and older  Osteoporosis Screening: covered every 2 years if you meet one of the following conditions: (1) You're estrogen deficient and at risk for osteoporosis based off medical history and other findings; (2) Have a vertebral abnormality; (3) On glucocorticoid therapy for more than 3 months; (4) Have primary hyperparathyroidism; (5) On osteoporosis medications and need to assess response to drug therapy  Last bone density test (DXA Scan): Not on file  HIV Screening: covered annually if you're between the age of 12-76  Also covered annually if you are younger than 13 and older than 72 with risk factors for HIV infection  For pregnant patients, it is covered up to 3 times per pregnancy      Immunizations:  Immunization Recommendations   Influenza Vaccine Annual influenza vaccination during flu season is recommended for all persons aged >= 6 months who do not have contraindications   Pneumococcal Vaccine   * Pneumococcal conjugate vaccine = PCV13 (Prevnar 13), PCV15 (Vaxneuvance), PCV20 (Prevnar 20)  * Pneumococcal polysaccharide vaccine = PPSV23 (Pneumovax) Adults 25-60 years old: 1-3 doses may be recommended based on certain risk factors  Adults 72 years old: 1-2 doses may be recommended based off what pneumonia vaccine you previously received Hepatitis B Vaccine 3 dose series if at intermediate or high risk (ex: diabetes, end stage renal disease, liver disease)   Tetanus (Td) Vaccine - COST NOT COVERED BY MEDICARE PART B Following completion of primary series, a booster dose should be given every 10 years to maintain immunity against tetanus  Td may also be given as tetanus wound prophylaxis  Tdap Vaccine - COST NOT COVERED BY MEDICARE PART B Recommended at least once for all adults  For pregnant patients, recommended with each pregnancy  Shingles Vaccine (Shingrix) - COST NOT COVERED BY MEDICARE PART B  2 shot series recommended in those aged 48 and above     Health Maintenance Due:      Topic Date Due    DXA SCAN  Never done    Breast Cancer Screening: Mammogram  07/07/2023    Hepatitis C Screening  Completed    Colorectal Cancer Screening  Discontinued     Immunizations Due:      Topic Date Due    Pneumococcal Vaccine: 65+ Years (3 - PPSV23 if available, else PCV20) 10/17/2020     Advance Directives   What are advance directives? Advance directives are legal documents that state your wishes and plans for medical care  These plans are made ahead of time in case you lose your ability to make decisions for yourself  Advance directives can apply to any medical decision, such as the treatments you want, and if you want to donate organs  What are the types of advance directives? There are many types of advance directives, and each state has rules about how to use them  You may choose a combination of any of the following:  Living will: This is a written record of the treatment you want  You can also choose which treatments you do not want, which to limit, and which to stop at a certain time  This includes surgery, medicine, IV fluid, and tube feedings  Durable power of  for healthcare [facility]): This is a written record that states who you want to make healthcare choices for you when you are unable to make them for yourself   This person, called a proxy, is usually a family member or a friend  You may choose more than 1 proxy  Do not resuscitate (DNR) order:  A DNR order is used in case your heart stops beating or you stop breathing  It is a request not to have certain forms of treatment, such as CPR  A DNR order may be included in other types of advance directives  Medical directive: This covers the care that you want if you are in a coma, near death, or unable to make decisions for yourself  You can list the treatments you want for each condition  Treatment may include pain medicine, surgery, blood transfusions, dialysis, IV or tube feedings, and a ventilator (breathing machine)  Values history: This document has questions about your views, beliefs, and how you feel and think about life  This information can help others choose the care that you would choose  Why are advance directives important? An advance directive helps you control your care  Although spoken wishes may be used, it is better to have your wishes written down  Spoken wishes can be misunderstood, or not followed  Treatments may be given even if you do not want them  An advance directive may make it easier for your family to make difficult choices about your care  Urinary Incontinence   Urinary incontinence (UI)  is when you lose control of your bladder  UI develops because your bladder cannot store or empty urine properly  The 3 most common types of UI are stress incontinence, urge incontinence, or both  Medicines:   May be given to help strengthen your bladder control  Report any side effects of medication to your healthcare provider  Do pelvic muscle exercises often:  Your pelvic muscles help you stop urinating  Squeeze these muscles tight for 5 seconds, then relax for 5 seconds  Gradually work up to squeezing for 10 seconds  Do 3 sets of 15 repetitions a day, or as directed  This will help strengthen your pelvic muscles and improve bladder control    Train your bladder: Go to the bathroom at set times, such as every 2 hours, even if you do not feel the urge to go  You can also try to hold your urine when you feel the urge to go  For example, hold your urine for 5 minutes when you feel the urge to go  As that becomes easier, hold your urine for 10 minutes  Self-care:   Keep a UI record  Write down how often you leak urine and how much you leak  Make a note of what you were doing when you leaked urine  Drink liquids as directed  You may need to limit the amount of liquid you drink to help control your urine leakage  Do not drink any liquid right before you go to bed  Limit or do not have drinks that contain caffeine or alcohol  Prevent constipation  Eat a variety of high-fiber foods  Good examples are high-fiber cereals, beans, vegetables, and whole-grain breads  Walking is the best way to trigger your intestines to have a bowel movement  Exercise regularly and maintain a healthy weight  Weight loss and exercise will decrease pressure on your bladder and help you control your leakage  Use a catheter as directed  to help empty your bladder  A catheter is a tiny, plastic tube that is put into your bladder to drain your urine  Go to behavior therapy as directed  Behavior therapy may be used to help you learn to control your urge to urinate  © Copyright [employer] 2018 Information is for End User's use only and may not be sold, redistributed or otherwise used for commercial purposes   All illustrations and images included in CareNotes® are the copyrighted property of A [employer] M [employer]  or [address] [place] [address]

## 2023-06-20 ENCOUNTER — OFFICE VISIT (OUTPATIENT)
Dept: FAMILY MEDICINE CLINIC | Facility: CLINIC | Age: 78
End: 2023-06-20
Payer: MEDICARE

## 2023-06-20 VITALS
DIASTOLIC BLOOD PRESSURE: 62 MMHG | BODY MASS INDEX: 22.78 KG/M2 | TEMPERATURE: 97.9 F | SYSTOLIC BLOOD PRESSURE: 142 MMHG | HEIGHT: 59 IN | HEART RATE: 66 BPM | OXYGEN SATURATION: 98 % | RESPIRATION RATE: 14 BRPM | WEIGHT: 113 LBS

## 2023-06-20 DIAGNOSIS — Z00.00 ENCOUNTER FOR MEDICARE ANNUAL WELLNESS EXAM: Primary | ICD-10-CM

## 2023-06-20 DIAGNOSIS — Z12.31 SCREENING MAMMOGRAM, ENCOUNTER FOR: ICD-10-CM

## 2023-06-20 DIAGNOSIS — I10 BENIGN ESSENTIAL HYPERTENSION: ICD-10-CM

## 2023-06-20 DIAGNOSIS — N39.3 FEMALE STRESS INCONTINENCE: ICD-10-CM

## 2023-06-20 DIAGNOSIS — N18.31 STAGE 3A CHRONIC KIDNEY DISEASE (HCC): ICD-10-CM

## 2023-06-20 PROCEDURE — 99214 OFFICE O/P EST MOD 30 MIN: CPT | Performed by: FAMILY MEDICINE

## 2023-06-20 PROCEDURE — G0439 PPPS, SUBSEQ VISIT: HCPCS | Performed by: FAMILY MEDICINE

## 2023-06-20 NOTE — PROGRESS NOTES
Assessment and Plan:     Problem List Items Addressed This Visit        Unprioritized    Benign essential hypertension    Stage 3a chronic kidney disease ([facility] )   Other Visit Diagnoses     Encounter for Medicare annual wellness exam    -  Primary    Screening mammogram, encounter for        Relevant Orders    Mammo screening bilateral w cad    Female stress incontinence               Preventive health issues were discussed with patient, and age appropriate screening tests were ordered as noted in patient's After Visit Summary  Personalized health advice and appropriate referrals for health education or preventive services given if needed, as noted in patient's After Visit Summary       History of Present Illness:     Patient presents for a Medicare Wellness Visit    HPI   Patient Care Team:  Mahi Mcdermott DO as PCP - General (Family Medicine)  MD Megan Hadley DO (Cardiology)  Ford Harmon MD (Neurosurgery)  Melodie Sandoavl (Obstetrics and Gynecology)     Review of Systems:     Review of Systems     Problem List:     Patient Active Problem List   Diagnosis   • History of subdural hemorrhage   • Headache   • History of intracerebral hemorrhage without residual deficit   • Bilateral carotid artery stenosis   • Benign essential hypertension   • Glaucoma   • Mixed hyperlipidemia   • Osteoarthritis of knee   • Vitamin D deficiency   • IFG (impaired fasting glucose)   • Word finding difficulty   • CKD (chronic kidney disease) stage 2, GFR 60-89 ml/min   • Diverticulosis   • Counseling regarding advanced directives   • Stage 3a chronic kidney disease ([facility] )      Past Medical and Surgical History:     Past Medical History:   Diagnosis Date   • Glaucoma    • High blood pressure 11/30/2018   • High cholesterol    • Hypertension    • Panic attacks 1998   • Papanicolaou smear 03/17/2016    NEG   • Post-menopausal    • Stroke [facility])      Past Surgical History:   Procedure Laterality Date   • COLONOSCOPY 2010   • COLONOSCOPY     • MAMMO (HISTORICAL) Bilateral 01/21/2019    No evidence of malignancy   • TONSILLECTOMY     • UPPER GASTROINTESTINAL ENDOSCOPY     • VAGINAL DELIVERY      X4      Family History:     Family History   Problem Relation Age of Onset   • Heart disease Sister    • Diabetes Sister    • Stroke Sister    • Dementia Mother    • Ulcerative colitis Son    • No Known Problems Daughter    • Stroke Maternal Grandmother    • Heart failure Maternal Grandfather    • Diabetes Paternal Grandmother    • No Known Problems Paternal Grandfather    • No Known Problems Son    • No Known Problems Daughter       Social History:     Social History     Socioeconomic History   • Marital status: /Civil Union     Spouse name: None   • Number of children: 4   • Years of education: None   • Highest education level: None   Occupational History   • Occupation: Retired   Tobacco Use   • Smoking status: Never   • Smokeless tobacco: Never   Vaping Use   • Vaping Use: Never used   Substance and Sexual Activity   • Alcohol use: Yes     Comment: 1 per week   • Drug use: No   • Sexual activity: Not Currently     Partners: Male     Birth control/protection: Post-menopausal   Other Topics Concern   • None   Social History Narrative   • None     Social Determinants of Health     Financial Resource Strain: Low Risk  (6/20/2023)    Overall Financial Resource Strain (CARDIA)    • Difficulty of Paying Living Expenses: Not hard at all   Food Insecurity: No Food Insecurity (4/24/2023)    Hunger Vital Sign    • Worried About Running Out of Food in the Last Year: Never true    • Ran Out of Food in the Last Year: Never true   Transportation Needs: No Transportation Needs (6/20/2023)    PRAPARE - Transportation    • Lack of Transportation (Medical): No    • Lack of Transportation (Non-Medical):  No   Physical Activity: Not on file   Stress: Not on file   Social Connections: Not on file   Intimate Partner Violence: Not on file   Housing Stability: Low Risk  (4/24/2023)    Housing Stability Vital Sign    • Unable to Pay for Housing in the Last Year: No    • Number of Places Lived in the Last Year: 1    • Unstable Housing in the Last Year: No      Medications and Allergies:     Current Outpatient Medications   Medication Sig Dispense Refill   • amLODIPine (NORVASC) 2 5 mg tablet Take 2 5 mg by mouth daily     • atorvastatin (LIPITOR) 40 mg tablet Take 1 tablet (40 mg total) by mouth daily 90 tablet 3   • Cholecalciferol (Vitamin D3) 50 MCG (2000 UT) capsule Take 2,000 Units by mouth daily      • lisinopril (ZESTRIL) 40 mg tablet Take 40 mg by mouth daily     • prednisoLONE acetate (PRED FORTE) 1 % ophthalmic suspension INSTILL 1 DROP IN THE RIGHT EYE EVERY OTHER DAY       No current facility-administered medications for this visit  Allergies   Allergen Reactions   • Sulfa Antibiotics Rash   • Codeine       Immunizations:     Immunization History   Administered Date(s) Administered   • COVID-19 PFIZER VACCINE 0 3 ML IM 01/20/2021, 02/10/2021, 12/18/2021   • INFLUENZA 10/12/2018, 10/19/2021, 10/26/2022   • Influenza Quadrivalent 3 years and older 10/16/2020   • Influenza, injectable, quadrivalent, preservative free 0 5 mL 10/12/2018   • Pneumococcal Conjugate 13-Valent 10/17/2019   • Pneumococcal Polysaccharide PPV23 01/01/2007   • Tdap 01/01/2007, 07/07/2017      Health Maintenance:         Topic Date Due   • Breast Cancer Screening: Mammogram  07/07/2023   • DXA SCAN  06/21/2033 (Originally 1945)   • Hepatitis C Screening  Completed   • Colorectal Cancer Screening  Discontinued         Topic Date Due   • Pneumococcal Vaccine: 65+ Years (3 - PPSV23 if available, else PCV20) 10/17/2020      Medicare Screening Tests and Risk Assessments:     Cristino Musa is here for her Subsequent Wellness visit  Health Risk Assessment:   Patient rates overall health as good  Patient feels that their physical health rating is same   Patient is very satisfied with their life  Eyesight was rated as same  Hearing was rated as same  Patient feels that their emotional and mental health rating is same  Patients states they are never, rarely angry  Patient states they are never, rarely unusually tired/fatigued  Pain experienced in the last 7 days has been none  Patient states that she has experienced no weight loss or gain in last 6 months  Fall Risk Screening: In the past year, patient has experienced: no history of falling in past year      Urinary Incontinence Screening:   Patient has leaked urine accidently in the last six months  Home Safety:  Patient does not have trouble with stairs inside or outside of their home  Patient has working smoke alarms Home safety hazards include: none  Nutrition:   Current diet is Regular  Medications:   Patient is not currently taking any over-the-counter supplements  Patient is able to manage medications  Activities of Daily Living (ADLs)/Instrumental Activities of Daily Living (IADLs):   Walk and transfer into and out of bed and chair?: Yes  Dress and groom yourself?: Yes    Bathe or shower yourself?: Yes    Feed yourself? Yes  Do your laundry/housekeeping?: Yes  Manage your money, pay your bills and track your expenses?: Yes  Make your own meals?: Yes    Do your own shopping?: Yes    Previous Hospitalizations:   Any hospitalizations or ED visits within the last 12 months?: No      Advance Care Planning:   Living will: Yes    Advanced directive: Yes    Advanced directive counseling given: Yes    End of Life Decisions reviewed with patient: Yes      Comments: DNR  Daughter Leonela Pérez       Cognitive Screening:   Provider or family/friend/caregiver concerned regarding cognition?: No    PREVENTIVE SCREENINGS      Cardiovascular Screening:    General: Screening Not Indicated and History Lipid Disorder      Diabetes Screening:     General: Screening Current      Colorectal Cancer Screening:     General: Screening "Current      Breast Cancer Screening:     General: Screening Current      Cervical Cancer Screening:    General: Screening Not Indicated      Lung Cancer Screening:     General: Screening Not Indicated      Hepatitis C Screening:    General: Screening Current    Screening, Brief Intervention, and Referral to Treatment (SBIRT)    Screening  Typical number of drinks in a day: 1  Typical number of drinks in a week: 2  Interpretation: Low risk drinking behavior  Single Item Drug Screening:  How often have you used an illegal drug (including marijuana) or a prescription medication for non-medical reasons in the past year? never    Single Item Drug Screen Score: 0  Interpretation: Negative screen for possible drug use disorder    No results found       Physical Exam:     /62   Pulse 66   Temp 97 9 °F (36 6 °C)   Resp 14   Ht 4' 11\" (1 499 m)   Wt 51 3 kg (113 lb)   SpO2 98%   BMI 22 82 kg/m²     Physical Exam     John Lewis DO  "

## 2023-07-14 DIAGNOSIS — Z12.31 SCREENING MAMMOGRAM, ENCOUNTER FOR: ICD-10-CM

## 2023-07-14 NOTE — RESULT ENCOUNTER NOTE
[facility],  Your mammogram is normal. We recommend you schedule your next mammogram in one year.    Have a good day,   Dr. Elaine Tabor

## 2023-07-15 DIAGNOSIS — E78.00 PURE HYPERCHOLESTEROLEMIA: ICD-10-CM

## 2023-07-17 RX ORDER — ATORVASTATIN CALCIUM 40 MG/1
TABLET, FILM COATED ORAL
Qty: 90 TABLET | Refills: 1 | OUTPATIENT
Start: 2023-07-17

## 2023-08-23 LAB — HBA1C MFR BLD HPLC: 5.8 %

## 2023-12-14 ENCOUNTER — RA CDI HCC (OUTPATIENT)
Dept: OTHER | Facility: HOSPITAL | Age: 78
End: 2023-12-14

## 2024-01-25 ENCOUNTER — TELEPHONE (OUTPATIENT)
Dept: FAMILY MEDICINE CLINIC | Facility: CLINIC | Age: 79
End: 2024-01-25

## 2024-01-26 NOTE — TELEPHONE ENCOUNTER
Called and spoke with patient's  Merrill. Asked Merrill to have patient call the office in regards to their appointment on 1/30/24.

## 2024-01-26 NOTE — TELEPHONE ENCOUNTER
Last blood work said June 2023 return in one year for ANGELLA. She was scheduled with me tues for f/u labs, but no labs were done. Please investigate reason for the visit

## 2024-01-30 ENCOUNTER — OFFICE VISIT (OUTPATIENT)
Dept: FAMILY MEDICINE CLINIC | Facility: CLINIC | Age: 79
End: 2024-01-30
Payer: MEDICARE

## 2024-01-30 VITALS
TEMPERATURE: 97.9 F | HEIGHT: 59 IN | HEART RATE: 70 BPM | BODY MASS INDEX: 22.9 KG/M2 | SYSTOLIC BLOOD PRESSURE: 158 MMHG | OXYGEN SATURATION: 99 % | WEIGHT: 113.6 LBS | RESPIRATION RATE: 18 BRPM | DIASTOLIC BLOOD PRESSURE: 74 MMHG

## 2024-01-30 DIAGNOSIS — D33.2 BENIGN NEOPLASM OF BRAIN, UNSPECIFIED BRAIN REGION (HCC): ICD-10-CM

## 2024-01-30 DIAGNOSIS — F43.9 SITUATIONAL STRESS: ICD-10-CM

## 2024-01-30 DIAGNOSIS — N18.31 STAGE 3A CHRONIC KIDNEY DISEASE (HCC): ICD-10-CM

## 2024-01-30 DIAGNOSIS — I10 BENIGN ESSENTIAL HYPERTENSION: Primary | ICD-10-CM

## 2024-01-30 PROCEDURE — 99215 OFFICE O/P EST HI 40 MIN: CPT | Performed by: FAMILY MEDICINE

## 2024-01-30 RX ORDER — HYDROCHLOROTHIAZIDE 25 MG/1
25 TABLET ORAL
Qty: 30 TABLET | Refills: 0 | Status: SHIPPED | OUTPATIENT
Start: 2024-01-30

## 2024-01-30 NOTE — PROGRESS NOTES
1. Benign essential hypertension  Assessment & Plan:  Blood pressure not controlled. Agrees to add hydrochlorothiazide 25 mg to lisinopril 40 mg. Follow up in one month. Will monitor at home also. Home cuff calibrated.     Orders:  -     hydroCHLOROthiazide 25 mg tablet; Take 1 tablet (25 mg total) by mouth daily in the early morning  -     Basic metabolic panel; Future; Expected date: 02/06/2024    2. Benign neoplasm of brain, unspecified brain region (HCC)  Assessment & Plan:  Seen on original imaging after stroke. Resolved with subsequent MRI and felt to be due to blood.       3. Stage 3a chronic kidney disease (HCC)  Assessment & Plan:  Lab Results   Component Value Date    EGFR 62 08/23/2023    EGFR 64 12/07/2022    EGFR 58 03/27/2022    CREATININE 0.94 08/23/2023    CREATININE 0.92 12/07/2022    CREATININE 0.94 03/27/2022   Update labs after adding hydrochlorothiazide       4. Situational stress  Assessment & Plan:  Pt's  with progressive memory loss and suspect Alzheimers. They have an appointment with geriatrics. Offered support. Recommend the 36 hour day. She feels she is doing okay, but will let us know if she needs more support.        5 minutes spent on chart prep, 40 minutes spent with patient counseling/educating on their diagnoses, tests completed and any new tests ordered, any referrals placed, treatment options, and documentation of above today.   In prescribing new medications, or changing doses, we reviewed the risks and benefits and side effects of these medications along with other treatment options if appropriate.       Return in about 1 month (around 2/29/2024) for BP recheck .    Subjective:   Shea is a 78 y.o. female here today for a follow-up on her current medical conditions:    Patient Active Problem List   Diagnosis   • History of subdural hemorrhage   • Headache   • History of intracerebral hemorrhage without residual deficit   • Bilateral carotid artery stenosis   •  Benign essential hypertension   • Glaucoma   • Mixed hyperlipidemia   • Osteoarthritis of knee   • Vitamin D deficiency   • IFG (impaired fasting glucose)   • Word finding difficulty   • CKD (chronic kidney disease) stage 2, GFR 60-89 ml/min   • Diverticulosis   • Counseling regarding advanced directives   • Stage 3a chronic kidney disease (HCC)   • Benign neoplasm of brain, unspecified brain region (HCC)   • Situational stress   • Cerebrovascular small vessel disease       Patient Care Team:  Susan Young DO as PCP - General (Family Medicine)  MD Shanna Victoria DO (Cardiology)  Ron Morgan MD (Neurosurgery)  Cathy Kelly (Obstetrics and Gynecology)    Current Medications:  Current Outpatient Medications   Medication Sig Dispense Refill   • amLODIPine (NORVASC) 2.5 mg tablet Take 2.5 mg by mouth daily     • atorvastatin (LIPITOR) 40 mg tablet Take 1 tablet (40 mg total) by mouth daily 90 tablet 3   • Cholecalciferol (Vitamin D3) 50 MCG (2000 UT) capsule Take 2,000 Units by mouth daily      • hydroCHLOROthiazide 25 mg tablet Take 1 tablet (25 mg total) by mouth daily in the early morning 30 tablet 0   • lisinopril (ZESTRIL) 40 mg tablet Take 40 mg by mouth daily     • prednisoLONE acetate (PRED FORTE) 1 % ophthalmic suspension INSTILL 1 DROP IN THE RIGHT EYE EVERY OTHER DAY       No current facility-administered medications for this visit.       HPI:  Chief Complaint   Patient presents with   • Medical Problem     Pt has concerns to speak to Dr. Young about.   • Hypertension     -- Above per clinical staff and reviewed. --    PHQ-2/9 Depression Screening    Little interest or pleasure in doing things: 0 - not at all  Feeling down, depressed, or hopeless: 0 - not at all  PHQ-2 Score: 0  PHQ-2 Interpretation: Negative depression screen       ?     6/20/24  Aug 2023 last blood work - A1C 5.8, otherwise normal.   aug 2023 labs scanned into chart.   follows with cardio for BP - in Dec  "increased lisinopril and lowered norvasc for LE edema.  was to repeat bmp. watch nevus behind her ear. referred to de  rm.    watch TSH/weight loss   follows with neuro Dr. Lowe , neurosurg Ashutosh/Shanae cardio [facility]    March hemorrhagic stroke. suspect due to BP. started on lsinopril 10 (?cough, norvawell contsc 10 and switched from zocor to lipitor 40.   BP cuff flora  ibrated and accurate   Today:  Here today with concerns about her    Struggling with her  - memory troubles.   She did take his car keys - he got lost the other day   Her mom had Alzheimer's     The following portions of the patient's history were reviewed and updated as appropriate: allergies, current medications, past family history, past medical history, past social history, past surgical history and problem list.    PHQ-2/9 Depression Screening    Little interest or pleasure in doing things: 0 - not at all  Feeling down, depressed, or hopeless: 0 - not at all  PHQ-2 Score: 0  PHQ-2 Interpretation: Negative depression screen         Objective:  Vitals:  /74 (BP Location: Left arm, Patient Position: Sitting, Cuff Size: Standard)   Pulse 70   Temp 97.9 °F (36.6 °C) (Temporal)   Resp 18   Ht 4' 11\" (1.499 m)   Wt 51.5 kg (113 lb 9.6 oz)   SpO2 99%   BMI 22.94 kg/m²    Wt Readings from Last 3 Encounters:   01/30/24 51.5 kg (113 lb 9.6 oz)   06/20/23 51.3 kg (113 lb)   04/24/23 52 kg (114 lb 9.6 oz)      BP Readings from Last 3 Encounters:   01/30/24 158/74   06/20/23 142/62   04/24/23 (!) 172/70        Review of Systems   She has no other concerns. No unexpected weight changes. No chest pain, SOB, or palpitations. No GERD. No changes in bowels or bladder. Not sleeping well. + mood changes. No headache or dizziness. No chest pain.     Physical Exam   Constitutional:  she appears well-developed and well-nourished.  HENT: Head: Normocephalic.   Neck: Neck supple.   Cardiovascular: Normal rate, regular rhythm and normal heart " sounds.   Pulmonary/Chest: Effort normal and breath sounds normal. No wheezes, rales, or rhonchi.   Abdominal: Soft. Bowel sounds are normal. There is no tenderness. No hepatosplenomegaly.   Musculoskeletal: she exhibits no edema.   Lymphadenopathy: she has no cervical adenopathy.   Neurological: she is alert and oriented to person, place, and time.   Skin: Skin is warm and dry.   Psychiatric: she has a normal mood and affect appropriately worried. her behavior is normal. Thought content normal.       Depression Screening and Follow-up Plan: Patient was screened for depression during today's encounter. They screened negative with a PHQ-2 score of 0.    Urinary Incontinence Plan of Care: counseling topics discussed: limiting fluid intake 3-4 hours before bed.

## 2024-01-30 NOTE — PATIENT INSTRUCTIONS
The 36-Hour Day, sixth edition: The 36-Hour Day: A Family Guide to Caring for People Who Have Alzheimer Disease, Other Dementias, and Memory Loss (A [facility] Book) Brenda Delgado

## 2024-01-31 PROBLEM — I67.9 CEREBROVASCULAR SMALL VESSEL DISEASE: Status: ACTIVE | Noted: 2024-01-31

## 2024-01-31 PROBLEM — F43.9 SITUATIONAL STRESS: Status: ACTIVE | Noted: 2024-01-31

## 2024-01-31 NOTE — ASSESSMENT & PLAN NOTE
Pt's  with progressive memory loss and suspect Alzheimers. They have an appointment with geriatrics. Offered support. Recommend the 36 hour day. She feels she is doing okay, but will let us know if she needs more support.

## 2024-01-31 NOTE — ASSESSMENT & PLAN NOTE
Lab Results   Component Value Date    EGFR 62 08/23/2023    EGFR 64 12/07/2022    EGFR 58 03/27/2022    CREATININE 0.94 08/23/2023    CREATININE 0.92 12/07/2022    CREATININE 0.94 03/27/2022   Update labs after adding hydrochlorothiazide

## 2024-01-31 NOTE — ASSESSMENT & PLAN NOTE
Blood pressure not controlled. Agrees to add hydrochlorothiazide 25 mg to lisinopril 40 mg. Follow up in one month. Will monitor at home also. Home cuff calibrated.    pt AOX4 c.o RLQ pain that started last night denies N/V fevers

## 2024-02-23 DIAGNOSIS — I10 BENIGN ESSENTIAL HYPERTENSION: ICD-10-CM

## 2024-02-23 RX ORDER — HYDROCHLOROTHIAZIDE 25 MG/1
25 TABLET ORAL
Qty: 30 TABLET | Refills: 0 | Status: SHIPPED | OUTPATIENT
Start: 2024-02-23

## 2024-02-26 ENCOUNTER — RA CDI HCC (OUTPATIENT)
Dept: OTHER | Facility: HOSPITAL | Age: 79
End: 2024-02-26

## 2024-03-04 ENCOUNTER — OFFICE VISIT (OUTPATIENT)
Dept: FAMILY MEDICINE CLINIC | Facility: CLINIC | Age: 79
End: 2024-03-04
Payer: MEDICARE

## 2024-03-04 VITALS
BODY MASS INDEX: 22.26 KG/M2 | WEIGHT: 110.4 LBS | HEART RATE: 66 BPM | SYSTOLIC BLOOD PRESSURE: 156 MMHG | HEIGHT: 59 IN | OXYGEN SATURATION: 99 % | TEMPERATURE: 97.9 F | DIASTOLIC BLOOD PRESSURE: 58 MMHG | RESPIRATION RATE: 16 BRPM

## 2024-03-04 DIAGNOSIS — N18.31 STAGE 3A CHRONIC KIDNEY DISEASE (HCC): ICD-10-CM

## 2024-03-04 DIAGNOSIS — E78.2 MIXED HYPERLIPIDEMIA: ICD-10-CM

## 2024-03-04 DIAGNOSIS — R73.01 IFG (IMPAIRED FASTING GLUCOSE): ICD-10-CM

## 2024-03-04 DIAGNOSIS — I10 BENIGN ESSENTIAL HYPERTENSION: Primary | ICD-10-CM

## 2024-03-04 DIAGNOSIS — I67.9 CEREBROVASCULAR SMALL VESSEL DISEASE: ICD-10-CM

## 2024-03-04 DIAGNOSIS — Z63.6 CAREGIVER BURDEN: ICD-10-CM

## 2024-03-04 PROCEDURE — G2211 COMPLEX E/M VISIT ADD ON: HCPCS | Performed by: FAMILY MEDICINE

## 2024-03-04 PROCEDURE — 99214 OFFICE O/P EST MOD 30 MIN: CPT | Performed by: FAMILY MEDICINE

## 2024-03-04 SDOH — SOCIAL STABILITY - SOCIAL INSECURITY: DEPENDENT RELATIVE NEEDING CARE AT HOME: Z63.6

## 2024-03-04 NOTE — PATIENT INSTRUCTIONS
Please bring your blood pressure cuff to the next visit for calibration. We will check your blood pressure with your cuff and our cuff to be sure that your home blood pressure cuff is pretty accurate.

## 2024-03-04 NOTE — PROGRESS NOTES
1. Benign essential hypertension  Assessment & Plan:  Not at goal today, but home blood pressure is well controlled. She will bring in her blood pressure cuff to the next visit for calibration.     Orders:  -     Basic metabolic panel; Future    2. Mixed hyperlipidemia  Assessment & Plan:  ASCVD risk high, on Lipitor 40 mg. She wishes to stay on this dose (or lower). Does not want to add Zetia. Recheck one year.       3. IFG (impaired fasting glucose)  Assessment & Plan:  Well controlled       4. Stage 3a chronic kidney disease (HCC)  Assessment & Plan:  Lab Results   Component Value Date    EGFR 58 (L) 02/01/2024    EGFR 62 08/23/2023    EGFR 64 12/07/2022    CREATININE 0.99 02/01/2024    CREATININE 0.94 08/23/2023    CREATININE 0.92 12/07/2022   Worsened some. Avoid NSAIDs.       5. Caregiver burden  Comments:  Pt taking care of  with dementia - looking for support groups  Orders:  -     Ambulatory Referral to Social Work Care Management Program; Future    6. Cerebrovascular small vessel disease  Comments:  no changes         Return in about 3 months (around 6/4/2024) for Medicare Wellness Visit.  & calibreate blood pressure cuff .    Subjective:   Shea is a 78 y.o. female here today for a follow-up on her current medical conditions:    Patient Active Problem List   Diagnosis   • History of subdural hemorrhage   • Headache   • History of intracerebral hemorrhage without residual deficit   • Bilateral carotid artery stenosis   • Benign essential hypertension   • Glaucoma   • Mixed hyperlipidemia   • Osteoarthritis of knee   • Vitamin D deficiency   • IFG (impaired fasting glucose)   • Word finding difficulty   • CKD (chronic kidney disease) stage 2, GFR 60-89 ml/min   • Diverticulosis   • Counseling regarding advanced directives   • Stage 3a chronic kidney disease (HCC)   • Benign neoplasm of brain, unspecified brain region (HCC)   • Situational stress   • Cerebrovascular small vessel disease        Patient Care Team:  Susan Young DO as PCP - General (Family Medicine)  MD Shanna Victoria DO (Cardiology)  Ron Morgan MD (Neurosurgery)  Cathy Kelly (Obstetrics and Gynecology)  SACHI Rai as  Care Manager (Care Coordination)    Current Medications:  Current Outpatient Medications   Medication Sig Dispense Refill   • amLODIPine (NORVASC) 2.5 mg tablet Take 2.5 mg by mouth daily     • atorvastatin (LIPITOR) 40 mg tablet Take 1 tablet (40 mg total) by mouth daily 90 tablet 3   • Cholecalciferol (Vitamin D3) 50 MCG (2000 UT) capsule Take 2,000 Units by mouth daily      • hydroCHLOROthiazide 25 mg tablet Take 1 tablet (25 mg total) by mouth daily in the early morning 30 tablet 0   • lisinopril (ZESTRIL) 40 mg tablet Take 40 mg by mouth daily     • prednisoLONE acetate (PRED FORTE) 1 % ophthalmic suspension INSTILL 1 DROP IN THE RIGHT EYE EVERY OTHER DAY       No current facility-administered medications for this visit.       HPI:  Chief Complaint   Patient presents with   • Hypertension     F/u. Pt has no concerns     -- Above per clinical staff and reviewed. --    PHQ-2/9 Depression Screening         ?     Feb 2024 labs done - GFR 58. Glu 95  Otherwise normal.   Chol 193, TG 65, HDL 83, LDL 97   6/20/24  Aug 2023 last blood work - A1C 5.8, otherwise normal.   aug 2023 labs scanned into chart.   follows with cardio for BP - in Dec increased lisinopril and l  owered norvasc for LE edema.  was to repeat bmp. watch nevus behind her ear. referred to derm.    watch TSH/weight loss   follows with neuro Dr. Lowe , neurosurg Ashutosh/Shanae , cardio [facility]    March hemorrhagic stroke. suspect due to BP. started on lsi  nopril 10 (?cough, norvawell contsc 10 and switched from zocor to lipitor 40.   BP cuff calibrated and accurate - last visit added hydrochlorothiazide to lisinoporil   Today:    had stroke or brain bleed  3/10/24  Home  machine was calibrated  "years ago   Doing well on the emeds  120-130/50-60   Little lightheaded with bending during night - no falls or near falls       The following portions of the patient's history were reviewed and updated as appropriate: allergies, current medications, past family history, past medical history, past social history, past surgical history and problem list.    Objective:  Vitals:  /58   Pulse 66   Temp 97.9 °F (36.6 °C) (Temporal)   Resp 16   Ht 4' 11\" (1.499 m)   Wt 50.1 kg (110 lb 6.4 oz)   SpO2 99%   BMI 22.30 kg/m²    Wt Readings from Last 3 Encounters:   03/04/24 50.1 kg (110 lb 6.4 oz)   01/30/24 51.5 kg (113 lb 9.6 oz)   06/20/23 51.3 kg (113 lb)      BP Readings from Last 3 Encounters:   03/04/24 156/58   01/30/24 158/74   06/20/23 142/62        Review of Systems   She has no other concerns. No unexpected weight changes. No chest pain, SOB, or palpitations. No GERD. No changes in bowels or bladder. Sleeping well. No mood changes.       Physical Exam   Constitutional:  she appears well-developed and well-nourished.  HENT: Head: Normocephalic.   Neck: Neck supple.   Cardiovascular: Normal rate, regular rhythm and normal heart sounds.   Pulmonary/Chest: Effort normal and breath sounds normal. No wheezes, rales, or rhonchi.   Abdominal: Soft. Bowel sounds are normal. There is no tenderness. No hepatosplenomegaly.   Musculoskeletal: she exhibits no edema.   Lymphadenopathy: she has no cervical adenopathy.   Neurological: she is alert and oriented to person, place, and time.   Skin: Skin is warm and dry.   Psychiatric: she has a normal mood and affect. her behavior is normal. Thought content normal.        "

## 2024-03-04 NOTE — ASSESSMENT & PLAN NOTE
ASCVD risk high, on Lipitor 40 mg. She wishes to stay on this dose (or lower). Does not want to add Zetia. Recheck one year.

## 2024-03-04 NOTE — ASSESSMENT & PLAN NOTE
Lab Results   Component Value Date    EGFR 58 (L) 02/01/2024    EGFR 62 08/23/2023    EGFR 64 12/07/2022    CREATININE 0.99 02/01/2024    CREATININE 0.94 08/23/2023    CREATININE 0.92 12/07/2022   Worsened some. Avoid NSAIDs.

## 2024-03-04 NOTE — ASSESSMENT & PLAN NOTE
Lab Results   Component Value Date    EGFR 58 (L) 02/01/2024    EGFR 62 08/23/2023    EGFR 64 12/07/2022    CREATININE 0.99 02/01/2024    CREATININE 0.94 08/23/2023    CREATININE 0.92 12/07/2022

## 2024-03-04 NOTE — ASSESSMENT & PLAN NOTE
Not at goal today, but home blood pressure is well controlled. She will bring in her blood pressure cuff to the next visit for calibration.

## 2024-03-07 ENCOUNTER — PATIENT OUTREACH (OUTPATIENT)
Dept: FAMILY MEDICINE CLINIC | Facility: CLINIC | Age: 79
End: 2024-03-07

## 2024-03-07 NOTE — PROGRESS NOTES
JOSE MANNING received referral for patient as she is interested in Caregiver Support groups.  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    JOSE MANNING reached out to patient and informed her the [facility] does offer a program.  Information will be e-mailed to her at [email or web address].    JOSE MANNING plan to f/u next week to ensure email has been received

## 2024-03-11 DIAGNOSIS — I10 BENIGN ESSENTIAL HYPERTENSION: ICD-10-CM

## 2024-03-11 RX ORDER — HYDROCHLOROTHIAZIDE 25 MG/1
25 TABLET ORAL
Qty: 30 TABLET | Refills: 5 | Status: SHIPPED | OUTPATIENT
Start: 2024-03-11

## 2024-03-14 ENCOUNTER — PATIENT OUTREACH (OUTPATIENT)
Dept: FAMILY MEDICINE CLINIC | Facility: CLINIC | Age: 79
End: 2024-03-14

## 2024-03-14 NOTE — PROGRESS NOTES
[facility] SW reached out to patient for f/u to ensure receipt of previous email sent.  OP[facility] SW left a voicemail

## 2024-03-21 ENCOUNTER — PATIENT OUTREACH (OUTPATIENT)
Dept: FAMILY MEDICINE CLINIC | Facility: CLINIC | Age: 79
End: 2024-03-21

## 2024-03-21 NOTE — PROGRESS NOTES
[facility] SW reached out to patient for f/u.  Patient confirms she does believe she received SW e-mail.  Her spouse was in the hospital and is now at [facility], so she intends to take this time to find someone to speak with.    No further SW needs at this time.

## 2024-09-04 DIAGNOSIS — I10 BENIGN ESSENTIAL HYPERTENSION: ICD-10-CM

## 2024-09-05 RX ORDER — HYDROCHLOROTHIAZIDE 25 MG/1
25 TABLET ORAL
Qty: 30 TABLET | Refills: 0 | Status: SHIPPED | OUTPATIENT
Start: 2024-09-05

## 2024-09-05 NOTE — TELEPHONE ENCOUNTER
Last office Note: Return in about 3 months (around 6/4/2024) for Medicare Wellness Visit.  & calibreate blood pressure cuff .     Patient needs an appointment. Please contact the patient to schedule an appointment. 30D Courtesy refill provided.

## 2024-09-05 NOTE — TELEPHONE ENCOUNTER
Called and spoke with patient. Patient would like to schedule appointment to discuss body aches she has been having off and on for a couple weeks. Patient scheduled for 9/17/24

## 2024-09-10 ENCOUNTER — OFFICE VISIT (OUTPATIENT)
Dept: FAMILY MEDICINE CLINIC | Facility: CLINIC | Age: 79
End: 2024-09-10
Payer: MEDICARE

## 2024-09-10 VITALS
SYSTOLIC BLOOD PRESSURE: 134 MMHG | HEART RATE: 61 BPM | WEIGHT: 115 LBS | TEMPERATURE: 98.3 F | RESPIRATION RATE: 16 BRPM | DIASTOLIC BLOOD PRESSURE: 64 MMHG | HEIGHT: 59 IN | OXYGEN SATURATION: 98 % | BODY MASS INDEX: 23.18 KG/M2

## 2024-09-10 DIAGNOSIS — E55.9 VITAMIN D DEFICIENCY: ICD-10-CM

## 2024-09-10 DIAGNOSIS — R29.898 MUSCULAR DECONDITIONING: ICD-10-CM

## 2024-09-10 DIAGNOSIS — E78.2 MIXED HYPERLIPIDEMIA: ICD-10-CM

## 2024-09-10 DIAGNOSIS — M25.552 HIP PAIN, LEFT: Primary | ICD-10-CM

## 2024-09-10 DIAGNOSIS — M79.642 BILATERAL HAND PAIN: ICD-10-CM

## 2024-09-10 DIAGNOSIS — M79.641 BILATERAL HAND PAIN: ICD-10-CM

## 2024-09-10 DIAGNOSIS — N18.2 CKD (CHRONIC KIDNEY DISEASE) STAGE 2, GFR 60-89 ML/MIN: ICD-10-CM

## 2024-09-10 DIAGNOSIS — F43.21 GRIEF: ICD-10-CM

## 2024-09-10 DIAGNOSIS — R73.01 IFG (IMPAIRED FASTING GLUCOSE): ICD-10-CM

## 2024-09-10 DIAGNOSIS — M65.341 TRIGGER RING FINGER OF RIGHT HAND: ICD-10-CM

## 2024-09-10 DIAGNOSIS — R22.32 MASS OF LEFT HAND: ICD-10-CM

## 2024-09-10 DIAGNOSIS — I67.9 CEREBROVASCULAR SMALL VESSEL DISEASE: ICD-10-CM

## 2024-09-10 PROCEDURE — 99214 OFFICE O/P EST MOD 30 MIN: CPT | Performed by: FAMILY MEDICINE

## 2024-09-10 PROCEDURE — G2211 COMPLEX E/M VISIT ADD ON: HCPCS | Performed by: FAMILY MEDICINE

## 2024-09-10 NOTE — ASSESSMENT & PLAN NOTE
Stable   Orders:    CBC and differential; Future    Comprehensive metabolic panel; Future    Lipid panel; Future    C-reactive protein; Future    Sedimentation rate, automated; Future    Rheumatoid Factor; Future    TSH, 3rd generation; Future    Vitamin D 25 hydroxy; Future    Hemoglobin A1C; Future    CBC and differential    Comprehensive metabolic panel    Lipid panel    C-reactive protein    Sedimentation rate, automated    TSH, 3rd generation    Vitamin D 25 hydroxy    Hemoglobin A1C

## 2024-09-10 NOTE — PROGRESS NOTES
Assessment & Plan  Hip pain, left  Lateral pain with palpation. Suspect trochanter bursitis.   Call if interested in referral to ortho  Orders:    CBC and differential; Future    Comprehensive metabolic panel; Future    Lipid panel; Future    C-reactive protein; Future    Sedimentation rate, automated; Future    Rheumatoid Factor; Future    TSH, 3rd generation; Future    Vitamin D 25 hydroxy; Future    Hemoglobin A1C; Future    CBC and differential    Comprehensive metabolic panel    Lipid panel    C-reactive protein    Sedimentation rate, automated    TSH, 3rd generation    Vitamin D 25 hydroxy    Hemoglobin A1C    Bilateral hand pain  OA suspected   Some weakness noted by pt of hands/arms - agrees to PT   Orders:    CBC and differential; Future    Comprehensive metabolic panel; Future    Lipid panel; Future    C-reactive protein; Future    Sedimentation rate, automated; Future    Rheumatoid Factor; Future    TSH, 3rd generation; Future    Vitamin D 25 hydroxy; Future    Hemoglobin A1C; Future    CBC and differential    Comprehensive metabolic panel    Lipid panel    C-reactive protein    Sedimentation rate, automated    TSH, 3rd generation    Vitamin D 25 hydroxy    Hemoglobin A1C    Ambulatory Referral to Physical Therapy; Future    Vitamin D deficiency  Update labs   Orders:    CBC and differential; Future    Comprehensive metabolic panel; Future    Lipid panel; Future    C-reactive protein; Future    Sedimentation rate, automated; Future    Rheumatoid Factor; Future    TSH, 3rd generation; Future    Vitamin D 25 hydroxy; Future    Hemoglobin A1C; Future    CBC and differential    Comprehensive metabolic panel    Lipid panel    C-reactive protein    Sedimentation rate, automated    TSH, 3rd generation    Vitamin D 25 hydroxy    Hemoglobin A1C    CKD (chronic kidney disease) stage 2, GFR 60-89 ml/min  Lab Results   Component Value Date    EGFR 58 (L) 02/01/2024    EGFR 62 08/23/2023    EGFR 64 12/07/2022     CREATININE 0.99 02/01/2024    CREATININE 0.94 08/23/2023    CREATININE 0.92 12/07/2022     Update labs   Orders:    CBC and differential; Future    Comprehensive metabolic panel; Future    Lipid panel; Future    C-reactive protein; Future    Sedimentation rate, automated; Future    Rheumatoid Factor; Future    TSH, 3rd generation; Future    Vitamin D 25 hydroxy; Future    Hemoglobin A1C; Future    CBC and differential    Comprehensive metabolic panel    Lipid panel    C-reactive protein    Sedimentation rate, automated    TSH, 3rd generation    Vitamin D 25 hydroxy    Hemoglobin A1C    Cerebrovascular small vessel disease  Stable   Orders:    CBC and differential; Future    Comprehensive metabolic panel; Future    Lipid panel; Future    C-reactive protein; Future    Sedimentation rate, automated; Future    Rheumatoid Factor; Future    TSH, 3rd generation; Future    Vitamin D 25 hydroxy; Future    Hemoglobin A1C; Future    CBC and differential    Comprehensive metabolic panel    Lipid panel    C-reactive protein    Sedimentation rate, automated    TSH, 3rd generation    Vitamin D 25 hydroxy    Hemoglobin A1C    IFG (impaired fasting glucose)  Update labs   Orders:    CBC and differential; Future    Comprehensive metabolic panel; Future    Lipid panel; Future    C-reactive protein; Future    Sedimentation rate, automated; Future    Rheumatoid Factor; Future    TSH, 3rd generation; Future    Vitamin D 25 hydroxy; Future    Hemoglobin A1C; Future    CBC and differential    Comprehensive metabolic panel    Lipid panel    C-reactive protein    Sedimentation rate, automated    TSH, 3rd generation    Vitamin D 25 hydroxy    Hemoglobin A1C    Mixed hyperlipidemia  Update labs   Orders:    CBC and differential; Future    Comprehensive metabolic panel; Future    Lipid panel; Future    C-reactive protein; Future    Sedimentation rate, automated; Future    Rheumatoid Factor; Future    TSH, 3rd generation; Future    Vitamin D 25  hydroxy; Future    Hemoglobin A1C; Future    CBC and differential    Comprehensive metabolic panel    Lipid panel    C-reactive protein    Sedimentation rate, automated    TSH, 3rd generation    Vitamin D 25 hydroxy    Hemoglobin A1C    Mass of left hand  Refer to hand surgeon   Orders:    Ambulatory Referral to Orthopedic Surgery; Future    Trigger ring finger of right hand  Refer to hand surgeon   Orders:    Ambulatory Referral to Orthopedic Surgery; Future    Muscular deconditioning  Agrees to PT   Orders:    Ambulatory Referral to Physical Therapy; Future    Grief    with home hospice in 2024 after long course of Alzheimer's. Pt doing okay, has good support system. Declines group support at this time.             Return for Medicare Wellness Visit.    Subjective:   Shea is a 78 y.o. female here today for a follow-up on her current medical conditions:    Patient Active Problem List   Diagnosis    History of subdural hemorrhage    Headache    History of intracerebral hemorrhage without residual deficit    Bilateral carotid artery stenosis    Benign essential hypertension    Glaucoma    Mixed hyperlipidemia    Osteoarthritis of knee    Vitamin D deficiency    IFG (impaired fasting glucose)    Word finding difficulty    CKD (chronic kidney disease) stage 2, GFR 60-89 ml/min    Diverticulosis    Counseling regarding advanced directives    Stage 3a chronic kidney disease (HCC)    Benign neoplasm of brain, unspecified brain region (HCC)    Situational stress    Cerebrovascular small vessel disease         Patient Care Team:  Susan Young DO as PCP - General (Family Medicine)  MD Shanna Victoria DO (Cardiology)  Ron Morgan MD (Neurosurgery)  Cathy Kelly (Obstetrics and Gynecology)    Current Medications:  Current Outpatient Medications   Medication Sig Dispense Refill    amLODIPine (NORVASC) 2.5 mg tablet Take 2.5 mg by mouth daily      Cholecalciferol (Vitamin D3)  50 MCG ( UT) capsule Take 2,000 Units by mouth daily       hydroCHLOROthiazide 25 mg tablet TAKE 1 TABLET BY MOUTH EVERY DAY IN THE EARLY MORNING 30 tablet 0    lisinopril (ZESTRIL) 40 mg tablet Take 40 mg by mouth daily      prednisoLONE acetate (PRED FORTE) 1 % ophthalmic suspension INSTILL 1 DROP IN THE RIGHT EYE EVERY OTHER DAY       No current facility-administered medications for this visit.       HPI:  Chief Complaint   Patient presents with    Generalized Body Aches     Patient states that she has been having body aches. Pain in bilateral hands and her left hip. Patient states that these symptoms started in the middle of August. Pain rated 5/10. Denies using any OTC medication to help alleviate the pain. Patient states that resting helps. States that when applying pressure on side, walking or moving around triggers the pain.     cramps     C/O left calf and into foot. Patient states that it felt like her toes were closing. Patient stats that it was odd. Occurred once or twice a week, has subsided since.     Chills     Pt states that it is summer time and she feels cold all of the time. Patient states this occurred on and off. Patient states that this has been ongoing for a few summers.      -- Above per clinical staff and reviewed. --    PHQ-2/9 Depression Screening    Little interest or pleasure in doing things: 0 - not at all  Feeling down, depressed, or hopeless: 0 - not at all  Trouble falling or staying asleep, or sleeping too much: 0 - not at all  Feeling tired or having little energy: 0 - not at all  Poor appetite or overeatin - not at all  Feeling bad about yourself - or that you are a failure or have let yourself or your family down: 0 - not at all  Trouble concentrating on things, such as reading the newspaper or watching television: 0 - not at all  Moving or speaking so slowly that other people could have noticed. Or the opposite - being so fidgety or restless that you have been moving  around a lot more than usual: 0 - not at all  Thoughts that you would be better off dead, or of hurting yourself in some way: 0 - not at all  PHQ-2 Score: 0  PHQ-2 Interpretation: Negative depression screen  PHQ-9 Score: 0  PHQ-9 Interpretation: No or Minimal depression       ANA M-7 Flowsheet Screening      Flowsheet Row Most Recent Value   Over the last two weeks, how often have you been bothered by the following problems?     Feeling nervous, anxious, or on edge 0   Not being able to stop or control worrying 0   Worrying too much about different things 0   Trouble relaxing  0   Being so restless that it's hard to sit still 0   Becoming easily annoyed or irritable  0   Feeling afraid as if something awful might happen 0   How difficult have these problems made it for you to do your work, take care of things at home, or get along with other people?  Not difficult at all   ANA M Score  0             2024 labs done - GFR 58. Glu 95  Otherwise normal.   Chol 193, TG 65, HDL 83, LDL 97   6/20/24  Aug 2023 last blood work - A1C 5.8, otherwise normal.   aug 2023 labs scanned into chart.   follows with cardio for BP - in Dec increased lisinopril and l  owered norvasc for LE edema.  was to repeat bmp. watch nevus behind her ear. referred to derm.    watch TSH/weight loss   follows with neuro Dr. Lowe , neurosurg Ashutosh/Shanae , cardio Berenice    March hemorrhagic stroke. suspect due to BP. started on lsi  nopril 10 (?cough, norvawell contsc 10 and switched from zocor to lipitor 40.   BP cuff calibrated and accurate - last visit added hydrochlorothiazide to lisinoporil   Today:  Since our last visit pt's  has  24 on home hospice   Doing okay   Good supports  Her 15 yo grandson was very involved  Her son is taking it hard - drinking a lot like his dad did   She stopped Lipitor 40 mg   Last blood work February for cardio LDL 97   Was having cramps in her legs   Pain on the side, worse in the morning, and if she  "lays on it   Tylenol tried and not helping   Rates pain as \"not bad\"  At first really bad   Blood pressure cuff at home but not using it   Hand pain trigger kristi     Swelling in hand has worsened     1000 iu vit D daily     The following portions of the patient's history were reviewed and updated as appropriate: allergies, current medications, past family history, past medical history, past social history, past surgical history and problem list.    Objective:  Vitals:  /64 (BP Location: Left arm, Patient Position: Sitting, Cuff Size: Standard)   Pulse 61   Temp 98.3 °F (36.8 °C) (Temporal)   Resp 16   Ht 4' 11\" (1.499 m)   Wt 52.2 kg (115 lb)   SpO2 98%   BMI 23.23 kg/m²    Wt Readings from Last 3 Encounters:   09/10/24 52.2 kg (115 lb)   03/04/24 50.1 kg (110 lb 6.4 oz)   01/30/24 51.5 kg (113 lb 9.6 oz)      BP Readings from Last 3 Encounters:   09/10/24 134/64   03/04/24 156/58   01/30/24 158/74        Review of Systems   She has no other concerns. No unexpected weight changes. No chest pain, SOB, or palpitations. No GERD. No changes in bowels or bladder. Sleeping well. + mood changes.     Physical Exam   Constitutional:  she appears well-developed and well-nourished.  HENT: Head: Normocephalic.   Neck: Neck supple.   Cardiovascular: Normal rate, regular rhythm and normal heart sounds.   Pulmonary/Chest: Effort normal and breath sounds normal. No wheezes, rales, or rhonchi.   Abdominal: Soft. Bowel sounds are normal. There is no tenderness. No hepatosplenomegaly.   Musculoskeletal: she exhibits no edema.   Trigger finger right 4th finger  Left lateral hand large soft mass  Right hip full range of motion without pain. Pain on palpation of left trochanteric bursa.   Lymphadenopathy: she has no cervical adenopathy.   Neurological: she is alert and oriented to person, place, and time.   Skin: Skin is warm and dry.   Psychiatric: she has a normal mood and affect. her behavior is normal. Thought content " normal. Tearful at times when talking about her /son/grandson.      Depression Screening and Follow-up Plan: Patient was screened for depression during today's encounter. They screened negative with a PHQ-2 score of 0.

## 2024-09-10 NOTE — ASSESSMENT & PLAN NOTE
Lab Results   Component Value Date    EGFR 58 (L) 02/01/2024    EGFR 62 08/23/2023    EGFR 64 12/07/2022    CREATININE 0.99 02/01/2024    CREATININE 0.94 08/23/2023    CREATININE 0.92 12/07/2022     Update labs   Orders:    CBC and differential; Future    Comprehensive metabolic panel; Future    Lipid panel; Future    C-reactive protein; Future    Sedimentation rate, automated; Future    Rheumatoid Factor; Future    TSH, 3rd generation; Future    Vitamin D 25 hydroxy; Future    Hemoglobin A1C; Future    CBC and differential    Comprehensive metabolic panel    Lipid panel    C-reactive protein    Sedimentation rate, automated    TSH, 3rd generation    Vitamin D 25 hydroxy    Hemoglobin A1C

## 2024-09-10 NOTE — ASSESSMENT & PLAN NOTE
Update labs   Orders:    CBC and differential; Future    Comprehensive metabolic panel; Future    Lipid panel; Future    C-reactive protein; Future    Sedimentation rate, automated; Future    Rheumatoid Factor; Future    TSH, 3rd generation; Future    Vitamin D 25 hydroxy; Future    Hemoglobin A1C; Future    CBC and differential    Comprehensive metabolic panel    Lipid panel    C-reactive protein    Sedimentation rate, automated    TSH, 3rd generation    Vitamin D 25 hydroxy    Hemoglobin A1C

## 2024-09-10 NOTE — PATIENT INSTRUCTIONS
[facility]: Dr. Barlow   -[address] [place] [address], [place] [address] [address]   [place], [place] [id number] [phone]     How much calcium should you have?  Children 1 - 3 yrs: 500 mg - 1 milk serving per day   Children 4 - 8 yrs: 800 mg - 2 milk serving per day   Children 9 - 18 yrs: 1,300 mg - 3 milk serving per day   Adults 19 - 50: 1,000 mg   Adults over 50:  1, 200 mg -1,500 mg      Some calcium rich foods:  ·         Dairy - low fat or fat free milk, soy milk, almond milk, soy milk, cheese, cottage cheese, yogurt, ice cream, frozen yogurt  ·         Green leafy vegetables like rod greens kale or mustard greens, broccoli  ·         Calcium fortified citrus juices  ·         Sardines and salmon with bones  ·         Laguerre beans, red beans, chickpeas  ·         Tofu  ·         Molasses  ·         Corn tortillas  ·         Seaweed, dry  ·         Almonds     ·         Taking a vitamin D supplement with calcium will help with absorption.  ·         Having a lot of caffeine containing drinks and antiacids can decrease your absorption of calcium.

## 2024-09-12 DIAGNOSIS — E87.1 HYPONATREMIA: Primary | ICD-10-CM

## 2024-09-12 LAB
25(OH)D3+25(OH)D2 SERPL-MCNC: 29 NG/ML (ref 30–100)
ALBUMIN SERPL-MCNC: 4.2 G/DL (ref 3.5–5.7)
ALP SERPL-CCNC: 42 U/L (ref 35–120)
ALT SERPL-CCNC: 12 U/L
ANION GAP SERPL CALCULATED.3IONS-SCNC: 8 MMOL/L (ref 3–11)
AST SERPL-CCNC: 19 U/L
BASOPHILS # BLD AUTO: 0 THOU/CMM (ref 0–0.1)
BASOPHILS NFR BLD AUTO: 1 %
BILIRUB SERPL-MCNC: 0.8 MG/DL (ref 0.2–1)
BUN SERPL-MCNC: 26 MG/DL (ref 7–25)
CALCIUM SERPL-MCNC: 9.5 MG/DL (ref 8.5–10.1)
CHLORIDE SERPL-SCNC: 95 MMOL/L (ref 100–109)
CHOLEST SERPL-MCNC: 285 MG/DL
CHOLEST/HDLC SERPL: 3 {RATIO}
CO2 SERPL-SCNC: 29 MMOL/L (ref 21–31)
CREAT SERPL-MCNC: 1.06 MG/DL (ref 0.4–1.1)
CRP SERPL-MCNC: <1 MG/L
CYTOLOGY CMNT CVX/VAG CYTO-IMP: ABNORMAL
DIFFERENTIAL METHOD BLD: NORMAL
EOSINOPHIL # BLD AUTO: 0 THOU/CMM (ref 0–0.5)
EOSINOPHIL NFR BLD AUTO: 0 %
ERYTHROCYTE [DISTWIDTH] IN BLOOD BY AUTOMATED COUNT: 13.4 % (ref 12–16)
ERYTHROCYTE [SEDIMENTATION RATE] IN BLOOD BY WESTERGREN METHOD: 9 MM/HR (ref 0–30)
EST. AVERAGE GLUCOSE BLD GHB EST-MCNC: 120 MG/DL
GFR/BSA.PRED SERPLBLD CYS-BASED-ARV: 53 ML/MIN/{1.73_M2}
GLUCOSE SERPL-MCNC: 88 MG/DL (ref 65–99)
HBA1C MFR BLD: 5.8 %
HCT VFR BLD AUTO: 35.5 % (ref 35–43)
HDLC SERPL-MCNC: 94 MG/DL (ref 23–92)
HGB BLD-MCNC: 12 G/DL (ref 11.5–14.5)
LDLC SERPL CALC-MCNC: 173 MG/DL
LYMPHOCYTES # BLD AUTO: 1.7 THOU/CMM (ref 1–3)
LYMPHOCYTES NFR BLD AUTO: 26 %
MCH RBC QN AUTO: 30.2 PG (ref 26–34)
MCHC RBC AUTO-ENTMCNC: 33.7 G/DL (ref 32–37)
MCV RBC AUTO: 90 FL (ref 80–100)
MONOCYTES # BLD AUTO: 0.9 THOU/CMM (ref 0.3–1)
MONOCYTES NFR BLD AUTO: 13 %
NEUTROPHILS # BLD AUTO: 4 THOU/CMM (ref 1.8–7.8)
NEUTROPHILS NFR BLD AUTO: 60 %
NONHDLC SERPL-MCNC: 191 MG/DL
PLATELET # BLD AUTO: 233 THOU/CMM (ref 140–350)
PMV BLD REES-ECKER: 9.4 FL (ref 7.5–11.3)
POTASSIUM SERPL-SCNC: 4.3 MMOL/L (ref 3.5–5.2)
PROT SERPL-MCNC: 6.3 G/DL (ref 6.3–8.3)
RBC # BLD AUTO: 3.96 MILL/CMM (ref 3.7–4.7)
SODIUM SERPL-SCNC: 132 MMOL/L (ref 135–145)
TRIGL SERPL-MCNC: 91 MG/DL
TSH SERPL-ACNC: 0.54 UIU/ML (ref 0.45–5.33)
WBC # BLD AUTO: 6.7 THOU/CMM (ref 4–10)

## 2024-09-25 LAB
ANION GAP SERPL CALCULATED.3IONS-SCNC: 9 MMOL/L (ref 3–11)
BUN SERPL-MCNC: 22 MG/DL (ref 7–25)
CALCIUM SERPL-MCNC: 9.4 MG/DL (ref 8.5–10.1)
CHLORIDE SERPL-SCNC: 100 MMOL/L (ref 100–109)
CO2 SERPL-SCNC: 28 MMOL/L (ref 21–31)
CREAT SERPL-MCNC: 0.99 MG/DL (ref 0.4–1.1)
CYTOLOGY CMNT CVX/VAG CYTO-IMP: ABNORMAL
GFR/BSA.PRED SERPLBLD CYS-BASED-ARV: 58 ML/MIN/{1.73_M2}
GLUCOSE SERPL-MCNC: 90 MG/DL (ref 65–99)
POTASSIUM SERPL-SCNC: 5 MMOL/L (ref 3.5–5.2)
RHEUMATOID FACT SER QL LA: <10 IU/ML
SODIUM SERPL-SCNC: 137 MMOL/L (ref 135–145)

## 2024-11-05 ENCOUNTER — OFFICE VISIT (OUTPATIENT)
Dept: FAMILY MEDICINE CLINIC | Facility: CLINIC | Age: 79
End: 2024-11-05
Payer: MEDICARE

## 2024-11-05 VITALS
HEART RATE: 87 BPM | SYSTOLIC BLOOD PRESSURE: 160 MMHG | OXYGEN SATURATION: 98 % | TEMPERATURE: 98.1 F | HEIGHT: 59 IN | WEIGHT: 115.8 LBS | BODY MASS INDEX: 23.35 KG/M2 | DIASTOLIC BLOOD PRESSURE: 58 MMHG | RESPIRATION RATE: 18 BRPM

## 2024-11-05 DIAGNOSIS — R73.01 IFG (IMPAIRED FASTING GLUCOSE): ICD-10-CM

## 2024-11-05 DIAGNOSIS — I10 BENIGN ESSENTIAL HYPERTENSION: ICD-10-CM

## 2024-11-05 DIAGNOSIS — Z71.89 COUNSELING REGARDING ADVANCED DIRECTIVES: ICD-10-CM

## 2024-11-05 DIAGNOSIS — E78.2 MIXED HYPERLIPIDEMIA: ICD-10-CM

## 2024-11-05 DIAGNOSIS — N18.31 STAGE 3A CHRONIC KIDNEY DISEASE (HCC): ICD-10-CM

## 2024-11-05 DIAGNOSIS — Z00.00 ENCOUNTER FOR MEDICARE ANNUAL WELLNESS EXAM: Primary | ICD-10-CM

## 2024-11-05 DIAGNOSIS — N18.2 CKD (CHRONIC KIDNEY DISEASE) STAGE 2, GFR 60-89 ML/MIN: ICD-10-CM

## 2024-11-05 PROCEDURE — 99214 OFFICE O/P EST MOD 30 MIN: CPT | Performed by: FAMILY MEDICINE

## 2024-11-05 PROCEDURE — G0439 PPPS, SUBSEQ VISIT: HCPCS | Performed by: FAMILY MEDICINE

## 2024-11-05 RX ORDER — HYDROCHLOROTHIAZIDE 12.5 MG/1
12.5 TABLET ORAL DAILY
Qty: 30 TABLET | Refills: 5 | Status: SHIPPED | OUTPATIENT
Start: 2024-11-05 | End: 2025-05-04

## 2024-11-05 NOTE — ASSESSMENT & PLAN NOTE
Patient agreed to start a statin.  She is tolerating this well.  Repeat CMP and lipids in about 8 weeks after starting this.  Orders:    Lipid panel; Future    Comprehensive metabolic panel; Future    Lipid panel    Comprehensive metabolic panel

## 2024-11-05 NOTE — PROGRESS NOTES
Assessment & Plan  Encounter for Medicare annual wellness exam  See below  She is up-to-date on her flu shot.  She declines COVID-vaccine.  She will consider PCV 20.       Counseling regarding advanced directives  Confirmed DNR, daughter is medical power of  if needed.       Benign essential hypertension  Repeat blood pressure 158/58.  She plans on purchasing a new home cuff   Schedule nurse visit for calibration  She got lower extremity edema from the higher dose of amlodipine.  In February her cardiologist had lowered her amlodipine and increased her lisinopril.  Her blood pressure at that visit was 146/80.  If her blood pressure remains elevated may recommend a different calcium channel blocker.  Hydrochlorothiazide was lowered by cardiology from 25 down to 12.5 due to hyponatremia.    Orders:    hydroCHLOROthiazide 12.5 mg tablet; Take 1 tablet (12.5 mg total) by mouth daily Per cardio    Mixed hyperlipidemia  Patient agreed to start a statin.  She is tolerating this well.  Repeat CMP and lipids in about 8 weeks after starting this.  Orders:    Lipid panel; Future    Comprehensive metabolic panel; Future    Lipid panel    Comprehensive metabolic panel    IFG (impaired fasting glucose)  Stable       CKD (chronic kidney disease) stage 2, GFR 60-89 ml/min  Lab Results   Component Value Date    EGFR 58 (L) 09/25/2024    EGFR 58 (L) 09/25/2024    EGFR 53 (L) 09/12/2024    EGFR 53 (L) 09/12/2024    CREATININE 0.99 09/25/2024    CREATININE 0.99 09/25/2024    CREATININE 1.06 09/12/2024    CREATININE 1.06 09/12/2024     Patient's GFR has been stable.  She does not take any NSAIDs.       Stage 3a chronic kidney disease (HCC)  Lab Results   Component Value Date    EGFR 58 (L) 09/25/2024    EGFR 58 (L) 09/25/2024    EGFR 53 (L) 09/12/2024    EGFR 53 (L) 09/12/2024    CREATININE 0.99 09/25/2024    CREATININE 0.99 09/25/2024    CREATININE 1.06 09/12/2024    CREATININE 1.06 09/12/2024                 Return in  about 1 year (around 11/5/2025) for Medicare Wellness Visit.    Subjective:   Shea is a 79 y.o. female here today for a follow-up on her current medical conditions:    Patient Active Problem List   Diagnosis    History of subdural hemorrhage    Headache    History of intracerebral hemorrhage without residual deficit    Bilateral carotid artery stenosis    Benign essential hypertension    Glaucoma    Mixed hyperlipidemia    Osteoarthritis of knee    Vitamin D deficiency    IFG (impaired fasting glucose)    Word finding difficulty    CKD (chronic kidney disease) stage 2, GFR 60-89 ml/min    Diverticulosis    Counseling regarding advanced directives    Stage 3a chronic kidney disease (HCC)    Benign neoplasm of brain, unspecified brain region (HCC)    Situational stress    Cerebrovascular small vessel disease         Patient Care Team:  Susan Young DO as PCP - General (Family Medicine)  MD Shanna Victoria DO (Cardiology)  Ron Morgan MD (Neurosurgery)  Cathy Kelly (Obstetrics and Gynecology)    Current Medications:  Current Outpatient Medications   Medication Sig Dispense Refill    amLODIPine (NORVASC) 2.5 mg tablet Take 2.5 mg by mouth daily      Cholecalciferol (Vitamin D3) 50 MCG (2000 UT) capsule Take 2,000 Units by mouth daily       hydroCHLOROthiazide 12.5 mg tablet Take 1 tablet (12.5 mg total) by mouth daily Per cardio 30 tablet 5    lisinopril (ZESTRIL) 40 mg tablet Take 40 mg by mouth daily      prednisoLONE acetate (PRED FORTE) 1 % ophthalmic suspension INSTILL 1 DROP IN THE RIGHT EYE EVERY OTHER DAY       No current facility-administered medications for this visit.       HPI:  Chief Complaint   Patient presents with    Medicare Wellness Visit     MAW -     PCV 20 - would like to think about it.      -- Above per clinical staff and reviewed. --    PHQ-2/9 Depression Screening                 July 2024   - Sept recommended increase vitmain D to [id number], repeat  "BMP, restart lipitor 40 mg   Feb 2024 labs done - GFR 58. Glu 95  Otherwise normal.   Chol 193, TG 65, HDL 83, LDL 97   6/20/24  Aug 2023 last blood work - A1C 5.8, otherwise   normal.   aug 2023 labs scanned into chart.   follows with cardio for BP - in Dec increased lisinopril and lowered norvasc for LE edema.  was to repeat bmp. watch nevus behind her ear. referred to derm.    watch TSH/weight loss   follows with neuro Dr. RILEY moseley , neurosurg Ashutosh/Shanae , cardio Be[facility]ice    March hemorrhagic stroke. suspect due to BP. started on lsinopril 10 (?cough, norvawell contsc 10 and switched from zocor to lipitor 40.   BP cuff calibrated and accurate - last visit added hydrochlorothia  zide to lisinoporil   Today:  Hands are sore   No other conerns   Can do what she needs to do   To see hand doctor next week   Right thumb now clicks   Does not check her blood pressure at home   Walked a bit from parking lot   Only taking 1/2 hydrochlorothiazide   Uses some low weights in the gym       The following portions of the patient's history were reviewed and updated as appropriate: allergies, current medications, past family history, past medical history, past social history, past surgical history and problem list.    Objective:  Vitals:  /58 (BP Location: Left arm, Patient Position: Sitting, Cuff Size: Standard)   Pulse 87   Temp 98.1 °F (36.7 °C)   Resp 18   Ht 4' 11\" (1.499 m)   Wt 52.5 kg (115 lb 12.8 oz)   SpO2 98%   BMI 23.39 kg/m²    Wt Readings from Last 3 Encounters:   11/05/24 52.5 kg (115 lb 12.8 oz)   09/10/24 52.2 kg (115 lb)   03/04/24 50.1 kg (110 lb 6.4 oz)      BP Readings from Last 3 Encounters:   11/05/24 160/58   09/10/24 134/64   03/04/24 156/58        Review of Systems   She has no other concerns. No unexpected weight changes. No chest pain, SOB, or palpitations. No GERD. No changes in bowels or bladder. Sleeping well. No mood changes.     Physical Exam   Constitutional:  she appears " well-developed and well-nourished.  HENT: Head: Normocephalic.   Neck: Neck supple.   Cardiovascular: Normal rate, regular rhythm and normal heart sounds.   Pulmonary/Chest: Effort normal and breath sounds normal. No wheezes, rales, or rhonchi.   Abdominal: Soft. Bowel sounds are normal. There is no tenderness. No hepatosplenomegaly.   Musculoskeletal: she exhibits no edema. + arthritis changes in her hands.   Lymphadenopathy: she has no cervical adenopathy.   Neurological: she is alert and oriented to person, place, and time.   Skin: Skin is warm and dry.   Psychiatric: she has a normal mood and affect. her behavior is normal. Thought content normal.

## 2024-11-05 NOTE — ASSESSMENT & PLAN NOTE
Lab Results   Component Value Date    EGFR 58 (L) 09/25/2024    EGFR 58 (L) 09/25/2024    EGFR 53 (L) 09/12/2024    EGFR 53 (L) 09/12/2024    CREATININE 0.99 09/25/2024    CREATININE 0.99 09/25/2024    CREATININE 1.06 09/12/2024    CREATININE 1.06 09/12/2024

## 2024-11-05 NOTE — ASSESSMENT & PLAN NOTE
Repeat blood pressure 158/58.  She plans on purchasing a new home cuff   Schedule nurse visit for calibration  She got lower extremity edema from the higher dose of amlodipine.  In February her cardiologist had lowered her amlodipine and increased her lisinopril.  Her blood pressure at that visit was 146/80.  If her blood pressure remains elevated may recommend a different calcium channel blocker.  Hydrochlorothiazide was lowered by cardiology from 25 down to 12.5 due to hyponatremia.    Orders:    hydroCHLOROthiazide 12.5 mg tablet; Take 1 tablet (12.5 mg total) by mouth daily Per cardio

## 2024-11-05 NOTE — PATIENT INSTRUCTIONS
Consider PCV20 (the updated pneumonia shot). You can get that here or at the pharmacy.   Medicare Preventive Visit Patient Instructions  Thank you for completing your Welcome to Medicare Visit or Medicare Annual Wellness Visit today. Your next wellness visit will be due in one year (11/6/2025).  The screening/preventive services that you may require over the next 5-10 years are detailed below. Some tests may not apply to you based off risk factors and/or age. Screening tests ordered at today's visit but not completed yet may show as past due. Also, please note that scanned in results may not display below.  Preventive Screenings:  Service Recommendations Previous Testing/Comments   Colorectal Cancer Screening  * Colonoscopy    * Fecal Occult Blood Test (FOBT)/Fecal Immunochemical Test (FIT)  * Fecal DNA/Cologuard Test  * Flexible Sigmoidoscopy Age: 45-75 years old   Colonoscopy: every 10 years (may be performed more frequently if at higher risk)  OR  FOBT/FIT: every 1 year  OR  Cologuard: every 3 years  OR  Sigmoidoscopy: every 5 years  Screening may be recommended earlier than age 45 if at higher risk for colorectal cancer. Also, an individualized decision between you and your healthcare provider will decide whether screening between the ages of 76-85 would be appropriate. Colonoscopy: 05/16/2022  FOBT/FIT: Not on file  Cologuard: Not on file  Sigmoidoscopy: Not on file          Breast Cancer Screening Age: 40+ years old  Frequency: every 1-2 years  Not required if history of left and right mastectomy Mammogram: 07/11/2024    Screening Current   Cervical Cancer Screening Between the ages of 21-29, pap smear recommended once every 3 years.   Between the ages of 30-65, can perform pap smear with HPV co-testing every 5 years.   Recommendations may differ for women with a history of total hysterectomy, cervical cancer, or abnormal pap smears in past. Pap Smear: 04/05/2019    Screening Not Indicated   Hepatitis C  Screening Once for adults born between 1945 and 1965  More frequently in patients at high risk for Hepatitis C Hep C Antibody: 12/07/2021    Screening Current   Diabetes Screening 1-2 times per year if you're at risk for diabetes or have pre-diabetes Fasting glucose: No results in last 5 years (No results in last 5 years)  A1C: 5.8 %; 5.8 % (9/12/2024)  Screening Current   Cholesterol Screening Once every 5 years if you don't have a lipid disorder. May order more often based on risk factors. Lipid panel: 09/12/2024    Screening Not Indicated  History Lipid Disorder     Other Preventive Screenings Covered by Medicare:  Abdominal Aortic Aneurysm (AAA) Screening: covered once if your at risk. You're considered to be at risk if you have a family history of AAA.  Lung Cancer Screening: covers low dose CT scan once per year if you meet all of the following conditions: (1) Age 55-77; (2) No signs or symptoms of lung cancer; (3) Current smoker or have quit smoking within the last 15 years; (4) You have a tobacco smoking history of at least 20 pack years (packs per day multiplied by number of years you smoked); (5) You get a written order from a healthcare provider.  Glaucoma Screening: covered annually if you're considered high risk: (1) You have diabetes OR (2) Family history of glaucoma OR (3)  aged 50 and older OR (4)  American aged 65 and older  Osteoporosis Screening: covered every 2 years if you meet one of the following conditions: (1) You're estrogen deficient and at risk for osteoporosis based off medical history and other findings; (2) Have a vertebral abnormality; (3) On glucocorticoid therapy for more than 3 months; (4) Have primary hyperparathyroidism; (5) On osteoporosis medications and need to assess response to drug therapy.   Last bone density test (DXA Scan): Not on file.  HIV Screening: covered annually if you're between the age of 15-65. Also covered annually if you are younger  than 15 and older than 65 with risk factors for HIV infection. For pregnant patients, it is covered up to 3 times per pregnancy.    Immunizations:  Immunization Recommendations   Influenza Vaccine Annual influenza vaccination during flu season is recommended for all persons aged >= 6 months who do not have contraindications   Pneumococcal Vaccine   * Pneumococcal conjugate vaccine = PCV13 (Prevnar 13), PCV15 (Vaxneuvance), PCV20 (Prevnar 20)  * Pneumococcal polysaccharide vaccine = PPSV23 (Pneumovax) Adults 19-65 yo with certain risk factors or if 65+ yo  If never received any pneumonia vaccine: recommend Prevnar 20 (PCV20)  Give PCV20 if previously received 1 dose of PCV13 or PPSV23   Hepatitis B Vaccine 3 dose series if at intermediate or high risk (ex: diabetes, end stage renal disease, liver disease)   Respiratory syncytial virus (RSV) Vaccine - COVERED BY MEDICARE PART D  * RSVPreF3 (Arexvy) CDC recommends that adults 60 years of age and older may receive a single dose of RSV vaccine using shared clinical decision-making (SCDM)   Tetanus (Td) Vaccine - COST NOT COVERED BY MEDICARE PART B Following completion of primary series, a booster dose should be given every 10 years to maintain immunity against tetanus. Td may also be given as tetanus wound prophylaxis.   Tdap Vaccine - COST NOT COVERED BY MEDICARE PART B Recommended at least once for all adults. For pregnant patients, recommended with each pregnancy.   Shingles Vaccine (Shingrix) - COST NOT COVERED BY MEDICARE PART B  2 shot series recommended in those 19 years and older who have or will have weakened immune systems or those 50 years and older     Health Maintenance Due:      Topic Date Due    DXA SCAN  06/21/2033 (Originally 1945)    Breast Cancer Screening: Mammogram  07/11/2025    Hepatitis C Screening  Completed    Colorectal Cancer Screening  Discontinued     Immunizations Due:      Topic Date Due    COVID-19 Vaccine (4 - 2023-24 season)  09/01/2024    Pneumococcal Vaccine: 65+ Years (3 of 3 - PPSV23 or PCV20) 10/17/2024     Advance Directives   What are advance directives?  Advance directives are legal documents that state your wishes and plans for medical care. These plans are made ahead of time in case you lose your ability to make decisions for yourself. Advance directives can apply to any medical decision, such as the treatments you want, and if you want to donate organs.   What are the types of advance directives?  There are many types of advance directives, and each state has rules about how to use them. You may choose a combination of any of the following:  Living will:  This is a written record of the treatment you want. You can also choose which treatments you do not want, which to limit, and which to stop at a certain time. This includes surgery, medicine, IV fluid, and tube feedings.   Durable power of  for healthcare (DPAHC):  This is a written record that states who you want to make healthcare choices for you when you are unable to make them for yourself. This person, called a proxy, is usually a family member or a friend. You may choose more than 1 proxy.  Do not resuscitate (DNR) order:  A DNR order is used in case your heart stops beating or you stop breathing. It is a request not to have certain forms of treatment, such as CPR. A DNR order may be included in other types of advance directives.  Medical directive:  This covers the care that you want if you are in a coma, near death, or unable to make decisions for yourself. You can list the treatments you want for each condition. Treatment may include pain medicine, surgery, blood transfusions, dialysis, IV or tube feedings, and a ventilator (breathing machine).  Values history:  This document has questions about your views, beliefs, and how you feel and think about life. This information can help others choose the care that you would choose.  Why are advance directives  important?  An advance directive helps you control your care. Although spoken wishes may be used, it is better to have your wishes written down. Spoken wishes can be misunderstood, or not followed. Treatments may be given even if you do not want them. An advance directive may make it easier for your family to make difficult choices about your care.       © Copyright [employer] 2018 Information is for End User's use only and may not be sold, redistributed or otherwise used for commercial purposes. All illustrations and images included in CareNotes® are the copyrighted property of [employer] Inc[employer] or [employer]

## 2024-11-05 NOTE — ASSESSMENT & PLAN NOTE
Lab Results   Component Value Date    EGFR 58 (L) 09/25/2024    EGFR 58 (L) 09/25/2024    EGFR 53 (L) 09/12/2024    EGFR 53 (L) 09/12/2024    CREATININE 0.99 09/25/2024    CREATININE 0.99 09/25/2024    CREATININE 1.06 09/12/2024    CREATININE 1.06 09/12/2024     Patient's GFR has been stable.  She does not take any NSAIDs.

## 2024-11-05 NOTE — PROGRESS NOTES
Ambulatory Visit  Name: Shea Mcclendon      : 1945      MRN: [id number]  Encounter Provider: Susan Yougn DO  Encounter Date: 2024   Encounter department: [facility]    Assessment & Plan       Preventive health issues were discussed with patient, and age appropriate screening tests were ordered as noted in patient's After Visit Summary. Personalized health advice and appropriate referrals for health education or preventive services given if needed, as noted in patient's After Visit Summary.    History of Present Illness     HPI   Patient Care Team:  Susan Young DO as PCP - General (Family Medicine)  MD Shanna Victoria DO (Cardiology)  Ron Morgan MD (Neurosurgery)  Cathy Kelly (Obstetrics and Gynecology)    Review of Systems  Medical History Reviewed by provider this encounter:       Annual Wellness Visit Questionnaire   Shea is here for her Subsequent Wellness visit. Last Medicare Wellness visit information reviewed, patient interviewed, no change since last AWV.     Health Risk Assessment:   Patient rates overall health as good. Patient feels that their physical health rating is same. Patient is very satisfied with their life. Eyesight was rated as same. Hearing was rated as same. Patient feels that their emotional and mental health rating is same. Patients states they are never, rarely angry. Patient states they are never, rarely unusually tired/fatigued. Pain experienced in the last 7 days has been none. Patient states that she has experienced no weight loss or gain in last 6 months.     Fall Risk Screening:   In the past year, patient has experienced: no history of falling in past year      Urinary Incontinence Screening:   Patient has not leaked urine accidently in the last six months.     Home Safety:  Patient does not have trouble with stairs inside or outside of their home. Patient has working smoke alarms and has  working carbon monoxide detector. Home safety hazards include: none.     Nutrition:   Current diet is Regular.     Medications:   Patient is not currently taking any over-the-counter supplements. Patient is able to manage medications.     Activities of Daily Living (ADLs)/Instrumental Activities of Daily Living (IADLs):   Walk and transfer into and out of bed and chair?: Yes  Dress and groom yourself?: Yes    Bathe or shower yourself?: Yes    Feed yourself? Yes  Do your laundry/housekeeping?: Yes  Manage your money, pay your bills and track your expenses?: Yes  Make your own meals?: Yes    Do your own shopping?: Yes    Previous Hospitalizations:   Any hospitalizations or ED visits within the last 12 months?: No      Advance Care Planning:   Living will: Yes    Durable POA for healthcare: Yes    Advanced directive: Yes    Advanced directive counseling given: Yes    End of Life Decisions reviewed with patient: Yes      Comments: Confirmed DNR. Daughter Thao Cali.     Cognitive Screening:   Provider or family/friend/caregiver concerned regarding cognition?: No    PREVENTIVE SCREENINGS      Cardiovascular Screening:    General: Screening Not Indicated and History Lipid Disorder      Diabetes Screening:     General: Screening Current      Colorectal Cancer Screening:     General: Screening Current      Breast Cancer Screening:     General: Screening Current      Cervical Cancer Screening:    General: Screening Not Indicated      Osteoporosis Screening:    General: Screening Current      Lung Cancer Screening:     General: Screening Not Indicated      Hepatitis C Screening:    General: Screening Current    Screening, Brief Intervention, and Referral to Treatment (SBIRT)    Screening  Typical number of drinks in a day: 1  Typical number of drinks in a week: 2  Interpretation: Low risk drinking behavior.    Single Item Drug Screening:  How often have you used an illegal drug (including marijuana) or a prescription  medication for non-medical reasons in the past year? never    Single Item Drug Screen Score: 0  Interpretation: Negative screen for possible drug use disorder    Social Determinants of Health     Financial Resource Strain: Low Risk  (6/20/2023)    Overall Financial Resource Strain (CARDIA)     Difficulty of Paying Living Expenses: Not hard at all   Food Insecurity: No Food Insecurity (4/24/2023)    Hunger Vital Sign     Worried About Running Out of Food in the Last Year: Never true     Ran Out of Food in the Last Year: Never true   Transportation Needs: No Transportation Needs (6/20/2023)    PRAPARE - Transportation     Lack of Transportation (Medical): No     Lack of Transportation (Non-Medical): No   Housing Stability: Low Risk  (4/24/2023)    Housing Stability Vital Sign     Unable to Pay for Housing in the Last Year: No     Number of Places Lived in the Last Year: 1     Unstable Housing in the Last Year: No     No results found.    Objective     There were no vitals taken for this visit.    Physical Exam

## 2024-12-04 ENCOUNTER — HOSPITAL ENCOUNTER (OUTPATIENT)
Dept: MRI IMAGING | Facility: HOSPITAL | Age: 79
Discharge: HOME | End: 2024-12-04
Payer: MEDICARE

## 2024-12-04 DIAGNOSIS — R22.32 LOCALIZED SWELLING, MASS AND LUMP, UPPER LIMB, LEFT: ICD-10-CM

## 2024-12-04 PROCEDURE — 73218 MRI UPPER EXTREMITY W/O DYE: CPT

## 2025-01-24 ENCOUNTER — NURSE TRIAGE (OUTPATIENT)
Age: 80
End: 2025-01-24

## 2025-01-24 NOTE — TELEPHONE ENCOUNTER
"Patient triaged off of complaints of redness and discharge in her belly button for several days. Patient also reports the area itches. States there is a white discharge that looks cottage cheese in appearance. Denies any increased spreading of redness and fevers. No OV available today. Please reach out to patient with provider response.       Reason for Disposition   Red, moist, irritated area between skin folds (or under larger breasts)    Answer Assessment - Initial Assessment Questions  1. APPEARANCE of RASH: \"Describe the rash.\"       In belly button  2. LOCATION: \"Where is the rash located?\"       Belly button  3. NUMBER: \"How many spots are there?\"       NA  4. SIZE: \"How big are the spots?\" (Inches, centimeters or compare to size of a coin)       NA  5. ONSET: \"When did the rash start?\"       Several days ago  6. ITCHING: \"Does the rash itch?\" If Yes, ask: \"How bad is the itch?\"  (Scale 0-10; or none, mild, moderate, severe)      yes  7. PAIN: \"Does the rash hurt?\" If Yes, ask: \"How bad is the pain?\"  (Scale 0-10; or none, mild, moderate, severe)      Denies   8. OTHER SYMPTOMS: \"Do you have any other symptoms?\" (e.g., fever)      White cottage cheese drainage   9. PREGNANCY: \"Is there any chance you are pregnant?\" \"When was your last menstrual period?\"      NA    Protocols used: Rash or Redness - Localized-Adult-[place]    "

## 2025-01-24 NOTE — TELEPHONE ENCOUNTER
Per , pt needs to go to urgent care.     Called pt and let her know she should proceed to urgent care for eval. Pt verbalized understanding and will go to [facility]

## 2025-04-23 LAB
ALBUMIN SERPL-MCNC: 4.4 G/DL (ref 3.5–5.7)
ALP SERPL-CCNC: 47 U/L (ref 35–120)
ALT SERPL-CCNC: 18 U/L
ANION GAP SERPL CALCULATED.3IONS-SCNC: 7 MMOL/L (ref 3–11)
AST SERPL-CCNC: 23 U/L
BILIRUB SERPL-MCNC: 0.7 MG/DL (ref 0.2–1)
BUN SERPL-MCNC: 18 MG/DL (ref 7–25)
CALCIUM SERPL-MCNC: 9.5 MG/DL (ref 8.5–10.5)
CHLORIDE SERPL-SCNC: 97 MMOL/L (ref 100–109)
CHOLEST SERPL-MCNC: 205 MG/DL
CHOLEST/HDLC SERPL: 2.3 {RATIO}
CO2 SERPL-SCNC: 30 MMOL/L (ref 21–31)
CREAT SERPL-MCNC: 0.87 MG/DL (ref 0.4–1.1)
CYTOLOGY CMNT CVX/VAG CYTO-IMP: ABNORMAL
GFR/BSA.PRED SERPLBLD CYS-BASED-ARV: 67 ML/MIN/{1.73_M2}
GLUCOSE SERPL-MCNC: 96 MG/DL (ref 65–99)
HDLC SERPL-MCNC: 91 MG/DL (ref 23–92)
LDLC SERPL CALC-MCNC: 98 MG/DL
NONHDLC SERPL-MCNC: 114 MG/DL
POTASSIUM SERPL-SCNC: 4.2 MMOL/L (ref 3.5–5.2)
PROT SERPL-MCNC: 6.2 G/DL (ref 6.3–8.3)
SODIUM SERPL-SCNC: 134 MMOL/L (ref 135–145)
TRIGL SERPL-MCNC: 79 MG/DL

## 2025-07-22 ENCOUNTER — TELEPHONE (OUTPATIENT)
Dept: ADMINISTRATIVE | Facility: OTHER | Age: 80
End: 2025-07-22

## 2025-07-22 NOTE — TELEPHONE ENCOUNTER
----- Message from Deja PABLO sent at 7/22/2025  9:46 AM EDT -----  Regarding: care gap request  07/22/25 9:46 AM    Hello, our patient attached above has had Mammogram completed/performed. Please assist in updating the patient chart by pulling the document from the Imaging/Procedure Tab. The date of service is 07/16/2025.    Thank you,  Deja Acosta PG  AWA

## 2025-07-22 NOTE — TELEPHONE ENCOUNTER
Upon review of the In Basket request we were able to locate, review, and update the patient chart as requested for Mammogram. 7/14/25 interfaced    Any additional questions or concerns should be emailed to the Practice Liaisons via the appropriate education email address, please do not reply via In Basket.    Thank you  Lena Antonio MA   [facility]